# Patient Record
Sex: FEMALE | Race: WHITE | NOT HISPANIC OR LATINO | Employment: OTHER | URBAN - METROPOLITAN AREA
[De-identification: names, ages, dates, MRNs, and addresses within clinical notes are randomized per-mention and may not be internally consistent; named-entity substitution may affect disease eponyms.]

---

## 2018-05-31 LAB — BCS RECOMMENDATION EXT: NORMAL

## 2018-08-07 LAB — CRC RECOMMENDATION EXT: NORMAL

## 2021-02-08 PROBLEM — M54.50 LOW BACK PAIN RADIATING TO RIGHT LEG: Status: ACTIVE | Noted: 2021-02-08

## 2021-02-08 PROBLEM — M79.604 LOW BACK PAIN RADIATING TO RIGHT LEG: Status: ACTIVE | Noted: 2021-02-08

## 2021-02-08 PROBLEM — M54.2 NECK PAIN: Status: ACTIVE | Noted: 2021-02-08

## 2021-02-08 PROBLEM — M79.602 BILATERAL ARM PAIN: Status: ACTIVE | Noted: 2021-02-08

## 2021-02-08 PROBLEM — M79.601 BILATERAL ARM PAIN: Status: ACTIVE | Noted: 2021-02-08

## 2021-02-08 PROBLEM — M51.369 DEGENERATIVE DISC DISEASE, LUMBAR: Status: ACTIVE | Noted: 2021-02-08

## 2021-02-08 PROBLEM — M50.30 DEGENERATIVE DISC DISEASE, CERVICAL: Status: ACTIVE | Noted: 2021-02-08

## 2021-02-08 PROBLEM — E66.01 MORBID OBESITY WITH BMI OF 40.0-44.9, ADULT: Status: ACTIVE | Noted: 2021-02-08

## 2021-02-08 PROBLEM — M51.36 DEGENERATIVE DISC DISEASE, LUMBAR: Status: ACTIVE | Noted: 2021-02-08

## 2022-02-21 ENCOUNTER — OFFICE VISIT (OUTPATIENT)
Dept: PRIMARY CARE CLINIC | Facility: CLINIC | Age: 57
End: 2022-02-21
Payer: MEDICARE

## 2022-02-21 VITALS
WEIGHT: 258 LBS | DIASTOLIC BLOOD PRESSURE: 81 MMHG | OXYGEN SATURATION: 94 % | HEIGHT: 65 IN | RESPIRATION RATE: 18 BRPM | HEART RATE: 94 BPM | BODY MASS INDEX: 42.99 KG/M2 | SYSTOLIC BLOOD PRESSURE: 122 MMHG

## 2022-02-21 DIAGNOSIS — R35.0 URINE FREQUENCY: ICD-10-CM

## 2022-02-21 DIAGNOSIS — Z00.00 ANNUAL PHYSICAL EXAM: ICD-10-CM

## 2022-02-21 DIAGNOSIS — F17.200 CURRENT SMOKER: ICD-10-CM

## 2022-02-21 DIAGNOSIS — E78.49 OTHER HYPERLIPIDEMIA: ICD-10-CM

## 2022-02-21 DIAGNOSIS — Z79.4 TYPE 2 DIABETES MELLITUS WITH OTHER NEUROLOGIC COMPLICATION, WITH LONG-TERM CURRENT USE OF INSULIN: ICD-10-CM

## 2022-02-21 DIAGNOSIS — E03.8 OTHER SPECIFIED HYPOTHYROIDISM: ICD-10-CM

## 2022-02-21 DIAGNOSIS — E66.01 MORBID OBESITY WITH BMI OF 40.0-44.9, ADULT: ICD-10-CM

## 2022-02-21 DIAGNOSIS — I10 BENIGN ESSENTIAL HTN: Primary | ICD-10-CM

## 2022-02-21 DIAGNOSIS — E11.49 TYPE 2 DIABETES MELLITUS WITH OTHER NEUROLOGIC COMPLICATION, WITH LONG-TERM CURRENT USE OF INSULIN: ICD-10-CM

## 2022-02-21 PROBLEM — E78.5 HYPERLIPIDEMIA: Status: ACTIVE | Noted: 2022-02-21

## 2022-02-21 PROCEDURE — 3074F PR MOST RECENT SYSTOLIC BLOOD PRESSURE < 130 MM HG: ICD-10-PCS | Mod: CPTII,S$GLB,, | Performed by: NURSE PRACTITIONER

## 2022-02-21 PROCEDURE — 1160F PR REVIEW ALL MEDS BY PRESCRIBER/CLIN PHARMACIST DOCUMENTED: ICD-10-PCS | Mod: CPTII,S$GLB,, | Performed by: NURSE PRACTITIONER

## 2022-02-21 PROCEDURE — 4010F PR ACE/ARB THEARPY RXD/TAKEN: ICD-10-PCS | Mod: CPTII,S$GLB,, | Performed by: NURSE PRACTITIONER

## 2022-02-21 PROCEDURE — 1159F MED LIST DOCD IN RCRD: CPT | Mod: CPTII,S$GLB,, | Performed by: NURSE PRACTITIONER

## 2022-02-21 PROCEDURE — 1160F RVW MEDS BY RX/DR IN RCRD: CPT | Mod: CPTII,S$GLB,, | Performed by: NURSE PRACTITIONER

## 2022-02-21 PROCEDURE — 99204 PR OFFICE/OUTPT VISIT, NEW, LEVL IV, 45-59 MIN: ICD-10-PCS | Mod: S$GLB,,, | Performed by: NURSE PRACTITIONER

## 2022-02-21 PROCEDURE — 3008F PR BODY MASS INDEX (BMI) DOCUMENTED: ICD-10-PCS | Mod: CPTII,S$GLB,, | Performed by: NURSE PRACTITIONER

## 2022-02-21 PROCEDURE — 3079F DIAST BP 80-89 MM HG: CPT | Mod: CPTII,S$GLB,, | Performed by: NURSE PRACTITIONER

## 2022-02-21 PROCEDURE — 3008F BODY MASS INDEX DOCD: CPT | Mod: CPTII,S$GLB,, | Performed by: NURSE PRACTITIONER

## 2022-02-21 PROCEDURE — 1159F PR MEDICATION LIST DOCUMENTED IN MEDICAL RECORD: ICD-10-PCS | Mod: CPTII,S$GLB,, | Performed by: NURSE PRACTITIONER

## 2022-02-21 PROCEDURE — 99204 OFFICE O/P NEW MOD 45 MIN: CPT | Mod: S$GLB,,, | Performed by: NURSE PRACTITIONER

## 2022-02-21 PROCEDURE — 3079F PR MOST RECENT DIASTOLIC BLOOD PRESSURE 80-89 MM HG: ICD-10-PCS | Mod: CPTII,S$GLB,, | Performed by: NURSE PRACTITIONER

## 2022-02-21 PROCEDURE — 3074F SYST BP LT 130 MM HG: CPT | Mod: CPTII,S$GLB,, | Performed by: NURSE PRACTITIONER

## 2022-02-21 PROCEDURE — 4010F ACE/ARB THERAPY RXD/TAKEN: CPT | Mod: CPTII,S$GLB,, | Performed by: NURSE PRACTITIONER

## 2022-02-21 RX ORDER — AMMONIUM LACTATE 12 G/100G
LOTION TOPICAL
COMMUNITY
Start: 2022-01-04

## 2022-02-21 RX ORDER — KETOCONAZOLE 20 MG/ML
SHAMPOO, SUSPENSION TOPICAL
COMMUNITY
Start: 2022-02-15 | End: 2023-02-23

## 2022-02-21 RX ORDER — ESTRADIOL 1 MG/1
1 TABLET ORAL DAILY
COMMUNITY
Start: 2021-12-17 | End: 2022-09-20

## 2022-02-21 RX ORDER — LORATADINE 10 MG/1
10 TABLET ORAL
COMMUNITY

## 2022-02-21 RX ORDER — ERGOCALCIFEROL 1.25 MG/1
50000 CAPSULE ORAL
COMMUNITY
End: 2022-08-23 | Stop reason: SDUPTHER

## 2022-02-21 RX ORDER — POTASSIUM CHLORIDE 20 MEQ/1
20 TABLET, EXTENDED RELEASE ORAL DAILY
COMMUNITY
Start: 2022-02-03 | End: 2022-06-23 | Stop reason: SDUPTHER

## 2022-02-21 RX ORDER — ATORVASTATIN CALCIUM 80 MG/1
80 TABLET, FILM COATED ORAL DAILY
COMMUNITY
Start: 2022-01-26 | End: 2023-08-30 | Stop reason: SDUPTHER

## 2022-02-21 RX ORDER — PREGABALIN 300 MG/1
300 CAPSULE ORAL 2 TIMES DAILY
COMMUNITY
Start: 2022-02-03 | End: 2022-08-23

## 2022-02-21 RX ORDER — OMEPRAZOLE 20 MG/1
20 CAPSULE, DELAYED RELEASE ORAL DAILY
COMMUNITY
Start: 2022-01-18 | End: 2022-06-15 | Stop reason: SDUPTHER

## 2022-02-21 RX ORDER — ALBUTEROL SULFATE 90 UG/1
AEROSOL, METERED RESPIRATORY (INHALATION)
COMMUNITY
Start: 2022-02-03 | End: 2022-11-21 | Stop reason: SDUPTHER

## 2022-02-21 RX ORDER — FUROSEMIDE 40 MG/1
40 TABLET ORAL DAILY
COMMUNITY
Start: 2022-01-18 | End: 2022-08-23

## 2022-02-21 RX ORDER — AZELASTINE 1 MG/ML
SPRAY, METERED NASAL
COMMUNITY
Start: 2022-02-14 | End: 2022-07-26 | Stop reason: SDUPTHER

## 2022-02-21 RX ORDER — FOLIC ACID 1 MG/1
1000 TABLET ORAL DAILY
COMMUNITY
Start: 2022-02-14 | End: 2022-09-14

## 2022-02-21 RX ORDER — MONTELUKAST SODIUM 10 MG/1
10 TABLET ORAL NIGHTLY
COMMUNITY
Start: 2022-02-14 | End: 2022-11-21

## 2022-02-21 RX ORDER — DAPAGLIFLOZIN AND METFORMIN HYDROCHLORIDE 5; 1000 MG/1; MG/1
TABLET, FILM COATED, EXTENDED RELEASE ORAL
COMMUNITY
Start: 2022-01-26 | End: 2024-01-29 | Stop reason: SDUPTHER

## 2022-02-21 RX ORDER — VARENICLINE TARTRATE 0.5 (11)-1
KIT ORAL
Qty: 1 EACH | Refills: 0 | Status: SHIPPED | OUTPATIENT
Start: 2022-02-21 | End: 2022-03-14 | Stop reason: SDUPTHER

## 2022-02-21 RX ORDER — FLUOCINONIDE TOPICAL SOLUTION USP, 0.05% 0.5 MG/ML
SOLUTION TOPICAL
COMMUNITY
Start: 2022-02-15 | End: 2023-02-23

## 2022-02-21 RX ORDER — LEVOTHYROXINE SODIUM 75 UG/1
75 TABLET ORAL EVERY MORNING
COMMUNITY
Start: 2022-01-26 | End: 2022-05-11

## 2022-02-21 RX ORDER — AMITRIPTYLINE HYDROCHLORIDE 25 MG/1
25 TABLET, FILM COATED ORAL DAILY
COMMUNITY
Start: 2022-02-03 | End: 2023-02-23

## 2022-02-21 RX ORDER — TRAMADOL HYDROCHLORIDE 50 MG/1
50 TABLET ORAL 4 TIMES DAILY
COMMUNITY
Start: 2021-12-23 | End: 2022-08-23

## 2022-02-21 RX ORDER — LOSARTAN POTASSIUM 100 MG/1
100 TABLET ORAL DAILY
COMMUNITY
Start: 2021-12-17 | End: 2022-09-20

## 2022-02-21 RX ORDER — CYCLOBENZAPRINE HCL 10 MG
TABLET ORAL
COMMUNITY
Start: 2022-01-18 | End: 2024-01-29 | Stop reason: SDUPTHER

## 2022-02-21 RX ORDER — IPRATROPIUM BROMIDE AND ALBUTEROL 20; 100 UG/1; UG/1
SPRAY, METERED RESPIRATORY (INHALATION)
COMMUNITY
Start: 2022-02-03 | End: 2022-06-23 | Stop reason: SDUPTHER

## 2022-02-21 RX ORDER — INSULIN GLARGINE 300 U/ML
INJECTION, SOLUTION SUBCUTANEOUS
COMMUNITY
Start: 2022-01-26

## 2022-02-21 RX ORDER — DULAGLUTIDE 4.5 MG/.5ML
INJECTION, SOLUTION SUBCUTANEOUS
COMMUNITY
Start: 2022-02-14 | End: 2022-08-23

## 2022-02-21 RX ORDER — ASPIRIN 325 MG
TABLET, DELAYED RELEASE (ENTERIC COATED) ORAL
COMMUNITY
Start: 2022-01-20 | End: 2022-11-21 | Stop reason: SDUPTHER

## 2022-02-21 RX ORDER — TRIAMCINOLONE ACETONIDE 1 MG/G
CREAM TOPICAL
COMMUNITY
Start: 2022-02-15 | End: 2023-02-23

## 2022-02-21 RX ORDER — PRAVASTATIN SODIUM 80 MG/1
80 TABLET ORAL DAILY
COMMUNITY
Start: 2022-02-14 | End: 2022-08-23

## 2022-02-21 NOTE — PATIENT INSTRUCTIONS
RTC in 2 months for F/U or sooner if needed.    Keep appts with specialists as scheduled.    Instructed patient to report to nearest ER or call 911 if begins to have difficulty breathing, turning blue, chest pain, B/P < 80/60 or >170/100, palpitations, syncope, extreme weakness, or severe H/A. Patient verbalized understanding.      Patient Education       Yearly Physical for Adults   About this topic   Most people do not want to be sick. Having a checkup each year with your doctor is one way to help you stay healthy. You may need to see your doctor more or less often. How often you need to go to the doctor depends on your age. Your family and medical history also play a role in how often you need to go to the doctor. Going to see your doctor on a routine basis can help you find problems early or even before they start. This may make it easier to treat or cure your problem.  General   Your doctor will talk about many things during your checkup. Your doctor may ask about:  Your medical and family history.  All the drugs you are taking. Be sure to include all prescription, over the counter, and herbal supplements. Tell the doctor if you have any drug allergy. Bring a list of drugs you take with you.  How you are feeling and if you are having any problems.  Risky behaviors like smoking, drinking alcohol, using illegal drugs, not wearing seatbelts, having unprotected sex, etc.  Your doctor will do a physical exam and may check your:  Height and weight  Blood pressure  Reflexes  Memory  Vision  Hearing  Your doctor may order:  Lab tests  ECG to check your heart rhythm  X-rays  Tests or treatments based on your exam  What lifestyle changes are needed?   Your doctor may suggest you make changes to your lifestyle at this visit. The doctor may talk with you about being more active or lowering stress levels. Ask your doctor what you need to do.  What drugs may be needed?   Your doctor may order drugs or vaccines to protect you  from illnesses.  What changes to diet are needed?   Talk to your doctor to see if any changes are needed to your diet.  When do I need to call the doctor?   Call your doctor if you need to learn about any test results. Together you can make a plan for more care.  Helpful tips   Make a list of questions for your doctor before you go. This will help you remember to ask about any concerns. Write down any answers from your doctor so you can look over them after your visit.   Tell your doctor about any changes in your body or health since your last visit.  Ask your doctor about any screening tests you need.  Where can I learn more?   [employer]  http://[email or web address]/familydoctor/en/prevention-wellness/staying-[email or web address]-living/preventive-services-for-healthy-living.printerview.html   Centers for Disease Control  http://www.cdc.gov/family/checkup/   Last Reviewed Date   2019-04-22  Consumer Information Use and Disclaimer   This information is not specific medical advice and does not replace information you receive from your health care provider. This is only a brief summary of general information. It does NOT include all information about conditions, illnesses, injuries, tests, procedures, treatments, therapies, discharge instructions or life-style choices that may apply to you. You must talk with your health care provider for complete information about your health and treatment options. This information should not be used to decide whether or not to accept your health care providers advice, instructions or recommendations. Only your health care provider has the knowledge and training to provide advice that is right for you.  Copyright   Copyright © 2021 [employer] and its affiliates and/or licensors. All rights reserved.  Patient Education       Low Cholesterol, Saturated Fat, and Trans Fat Diet   About this topic   Cholesterol, saturated fat, and trans fat are in many foods. These  "may raise your blood cholesterol levels. If your cholesterol is too high, this can cause health problems in your heart, liver, kidneys, and even your eyes. The key to lowering your risk of heart problems is to lower your bad fat intake.  Saturated fats and trans fats are the bad fats. These fats clog your arteries and raise your bad cholesterol. Saturated fats and trans fats are solid fats at room temperature. Saturated fats are animal fats. Trans fats are manmade fats. They add flavor to a lot of packaged foods. Staying away from saturated and trans fats will help your heart.  When you do eat foods with fat, make sure they have the good fats. Monounsaturated and polyunsaturated fats are good fats. These fats help raise your good cholesterol and protect your heart.  General   How to Lower Fat and Cholesterol in Your Diet   Read the labels of the foods you buy from the market to find out how much fat is present. Under 5% of total fat on a label means it is "low fat". Over 20% of total fat on a label means it is high fat.  Eat high fiber foods, like soluble fiber. This type of fiber helps lower cholesterol in the body. Choose oatmeal, fruits (like apples), beans, and nuts to get the most soluble fiber.  Eat foods high in omega-3 fatty acids like gopal seeds, walnuts, salmon, tuna, trout, herring, flaxseed, and soybeans. These foods help keep the heart healthy.  Limit your bad fat and oil intake.  Stay away from butter, stick margarine, shortening, lard, and palm and coconut oil. Pick plant-based spreads instead.  Limit mayonnaise, salad dressings, gravies, and sauces, unless it is made from low-fat ingredients.  Limit chocolate.  Do not eat high-fat processed foods like hot dogs, feliciano, sausage, ham and other luncheon meats high in fat, and some frozen foods. Pick fish, chicken, turkey, and lean meats instead.  Eat more dried beans, lentils, and tofu to get your protein.  Do not eat organ meats, like liver.  Choose " nonfat or low-fat milk, yogurt, and cheese.  Use light or fat-free cream cheese and sour cream.  Eat lots of fruits and vegetables.  Pick whole grain breads, cereals, pastas, and rice.  Do not eat snacks that are high in fats like granola, cookies, pies, pastries, doughnuts, and croissants.  Stay away from deep fried foods.  Help When Cooking   Remove the fat portion of meats and the skin from poultry before cooking.  Bake, broil, grill, poach, or roast poultry, fish, and lean meats.  Drain and throw away the fat that drains out of meat as you cook it.  Try to add little or no fat to foods.  Use olive or canola oil for cooking or baking.  Steam your vegetables.  Use herbs or no-oil marinades to flavor foods.         Who should use this diet?   This diet is for people who are at high risk of getting health problems like heart disease, high blood pressure, diabetes, and others. This diet is also good for all people to follow to keep your heart healthy.  What foods are good to eat?   Foods with good fats are:  Canola, peanut, and olive oil  Safflower, soybean, and corn oil  Walnuts, almonds, cashews, and peanuts  Pumpkin and sunflower seeds  [place] and tuna  Tofu  Soymilk  Avocado  What foods should be limited or avoided?   Stay away from these types of foods that have saturated fats:  Whole fat dairy products like cheese, ice cream, whole milk, and cream  Palm and coconut oils  High-fat meats like beef, lamb, poultry with the skin, feliciano, and sausage  Butter and lard  Stay away from these types of foods that may have trans fat:  Cookies, cakes, candy, doughnuts, baked goods, muffins, pizza dough, and pie crusts that are packaged  Fried foods  Frozen dinners  Chips and crackers  Microwave popcorn  Stick margarine and vegetable shortenings  Helpful tips   To help stay away from saturated fat:  Pick lean cuts of meat  Take the skin off chicken and turkey or pick skinless  Pick low-fat cheese, milk, and ice cream  Use  liquid oils when cooking and baking, such as olive oil and canola oil  To help stay away from trans fat:  Look at your labels. Choose foods with 0% trans fat. Read the ingredient list. Avoid foods with partially hydrogenated oil in the ingredient list. This means there is trans fat in the product.  Where can I learn more?   American Heart Association   http://www.heart.org/HEARTORG/Conditions/Cholesterol/PreventionTreatmentofHighCholesterol/Know-Your-Fats_[facility]_305628_Article.jsp   Last Reviewed Date   2021-10-05  Consumer Information Use and Disclaimer   This information is not specific medical advice and does not replace information you receive from your health care provider. This is only a brief summary of general information. It does NOT include all information about conditions, illnesses, injuries, tests, procedures, treatments, therapies, discharge instructions or life-style choices that may apply to you. You must talk with your health care provider for complete information about your health and treatment options. This information should not be used to decide whether or not to accept your health care providers advice, instructions or recommendations. Only your health care provider has the knowledge and training to provide advice that is right for you.   Copyright   Copyright © 2021 [employer] and its affiliates and/or licensors. All rights reserved.

## 2022-02-21 NOTE — PROGRESS NOTES
Subjective:       Patient ID: Anca Grace is a 56 y.o. female.    Chief Complaint: Establish Care    HPI: Anca is a 56 y.o. female who presents to establish care with a new PCP. Previously seen by Dr. Severino in [place].    DM II -  Chronic and uncontrolled on meds, b/p averaging < 130/80, denies s/e or a/r. Followed by [facility] for Endocrinology for her DM Type 2 AND Hypothyroidism. Takes Toujeo 100 units daily, Trulicity 4.5 weekly and Xigduo daily. Her blood sugars run between 200-350. Next appt is in April. Has Neuropathy and she takes Lyrica for it which is prescribed by     Also followed by a Hematologist, in [place] due to some abnormalities.    DMII - stable, controlled on meds, BG averaging < 130, denies hypoglycemic episodes, denies s/e or a/r.     Hyperlipidemia - controlled with a STATIN. Denies se/ar.    Hypothyroidism - takes levothyroxine 75 mcg daily. Denies se/ar to medication.    Family history of heart disease, HLD, AND HTN. Also with Colon CA in her paternal grandfather and brother.    Had a colonoscopy done at [facility] 4 years ago and had couple polyps removed. Repeat in 5 years.    Mammogram done last year at [facility] and was normal.    Followed by Dr. Angel in [place]. Had a carpal tunnel surgery, scope to left knee last August and some of her bone was scraped for her arthritis. Follows up monthly for muscle relaxer injections. Also followed by Dr. Gallegos for lower back pain and he does back injections.    Had a complete hysterectomy several years ago.    Smokes 1 ppd for 15 years. Would like to quit smoking. Asking for script for Chantix, tried patches in the past with no success. Denies drinking.    UTD with COVID and FLU vaccines.              Past Medical History:   Diagnosis Date    Diabetes mellitus, type 2     GERD (gastroesophageal reflux disease)     Hyperlipidemia     Hypertension        Past Surgical History:   Procedure Laterality Date    CARPAL TUNNEL RELEASE       HYSTERECTOMY         Family History   Problem Relation Age of Onset    Thyroiditis Mother     Heart disease Father     Graves' disease Sister     Heart disease Maternal Grandfather     Cancer Paternal Grandfather        Social History     Tobacco Use    Smoking status: Current Every Day Smoker     Types: Cigarettes    Smokeless tobacco: Never Used   Substance Use Topics    Alcohol use: Never    Drug use: Never       Patient Active Problem List   Diagnosis    Degenerative disc disease, cervical    Degenerative disc disease, lumbar    Neck pain    Bilateral arm pain    Low back pain radiating to right leg    Morbid obesity with BMI of 40.0-44.9, adult    Hyperlipidemia    Benign essential HTN       Immunization History   Administered Date(s) Administered    COVID-19, MRNA, LN-S, PF (MODERNA FULL 0.5 ML DOSE) 10/30/2021, 11/27/2021    DTaP 1966, 01/18/1967, 03/08/1967, 04/10/1968, 03/03/1971, 08/01/1977    IPV 1966, 01/18/1967, 03/08/1967, 04/10/1968, 04/07/1978    Influenza - Quadrivalent - PF *Preferred* (6 months and older) 10/14/2021    Measles 11/07/1967    Mumps 11/13/1968    Rubella 10/21/1970    Tdap 04/13/1988, 08/06/2004           Review of Systems   Constitutional: Negative for activity change, appetite change, chills, diaphoresis, fatigue and fever.   HENT: Negative for congestion, ear pain, sinus pain, tinnitus and trouble swallowing.    Eyes: Negative for pain and visual disturbance.   Respiratory: Negative for cough, chest tightness, shortness of breath and wheezing.    Cardiovascular: Negative for chest pain, palpitations and leg swelling.   Gastrointestinal: Negative for abdominal distention, abdominal pain, blood in stool, constipation, diarrhea, nausea and vomiting.   Endocrine: Positive for polydipsia, polyphagia and polyuria. Negative for cold intolerance and heat intolerance.   Genitourinary: Negative for decreased urine volume, dysuria, frequency,  "hematuria and urgency.   Musculoskeletal: Positive for arthralgias (knees) and back pain (chronic). Negative for gait problem, neck pain and neck stiffness.   Skin: Negative for color change, pallor and rash.   Neurological: Negative for dizziness, syncope, weakness, light-headedness, numbness and headaches.   Hematological: Negative for adenopathy. Does not bruise/bleed easily.   Psychiatric/Behavioral: Negative for behavioral problems, confusion and dysphoric mood. The patient is not nervous/anxious.      Objective:     Vitals:    02/21/22 1143   BP: 122/81   BP Location: Right arm   Patient Position: Sitting   BP Method: Large (Automatic)   Pulse: 94   Resp: 18   SpO2: (!) 94%   Weight: 117 kg (258 lb)   Height: 5' 5" (1.651 m)       Physical Exam  Vitals and nursing note reviewed.   Constitutional:       General: She is not in acute distress.     Appearance: Normal appearance. She is obese. She is not ill-appearing or diaphoretic.   HENT:      Head: Normocephalic and atraumatic.      Right Ear: External ear normal.      Left Ear: External ear normal.      Nose: Nose normal.      Mouth/Throat:      Mouth: Mucous membranes are moist.      Pharynx: Oropharynx is clear.   Eyes:      Extraocular Movements: Extraocular movements intact.      Conjunctiva/sclera: Conjunctivae normal.      Pupils: Pupils are equal, round, and reactive to light.   Neck:      Thyroid: No thyromegaly or thyroid tenderness.   Cardiovascular:      Rate and Rhythm: Normal rate and regular rhythm.      Pulses: Normal pulses.      Heart sounds: Normal heart sounds.   Pulmonary:      Effort: Pulmonary effort is normal.      Breath sounds: Normal breath sounds.   Abdominal:      General: Bowel sounds are normal. There is no distension.      Palpations: Abdomen is soft.      Tenderness: There is no abdominal tenderness.   Musculoskeletal:         General: Tenderness (lower back and knees) present. Normal range of motion.      Cervical back: Normal " range of motion and neck supple.      Right lower leg: No edema.      Left lower leg: No edema.   Lymphadenopathy:      Cervical: No cervical adenopathy.   Skin:     General: Skin is warm and dry.      Capillary Refill: Capillary refill takes less than 2 seconds.   Neurological:      Mental Status: She is alert and oriented to person, place, and time.      Cranial Nerves: Cranial nerves are intact.      Sensory: Sensory deficit present.      Motor: Motor function is intact.      Coordination: Coordination is intact.      Gait: Gait is intact.      Deep Tendon Reflexes: Reflexes are normal and symmetric.   Psychiatric:         Mood and Affect: Mood and affect normal.         Speech: Speech normal.         Behavior: Behavior normal. Behavior is cooperative.         Thought Content: Thought content normal.         Cognition and Memory: Cognition and memory normal.         Judgment: Judgment normal.         No visits with results within 6 Month(s) from this visit.   Latest known visit with results is:   No results found for any previous visit.         Assessment:      1. Benign essential HTN Well controlled   2. Other hyperlipidemia Stable   3. Morbid obesity with BMI of 40.0-44.9, adult    4. Type 2 diabetes mellitus with other neurologic complication, with long-term current use of insulin Chronic   5. Other specified hypothyroidism Stable   6. Current smoker    7. Annual physical exam    8. Urine frequency          Plan:     Benign essential HTN  Comments:  continue BP medication, limit salt intake.  Orders:  -     CBC Auto Differential; Future; Expected date: 02/21/2022  -     Comprehensive Metabolic Panel; Future; Expected date: 02/21/2022  -     Lipid Panel; Future; Expected date: 02/21/2022  -     TSH w/reflex to FT4; Future; Expected date: 02/21/2022    Other hyperlipidemia  Comments:  Continue STATIN daily, eat low cholesterol diet and exercise regularly  Orders:  -     Lipid Panel; Future; Expected date:  02/21/2022    Morbid obesity with BMI of 40.0-44.9, adult    Type 2 diabetes mellitus with other neurologic complication, with long-term current use of insulin  Comments:  Continue current medications, keep scheduled appts with Endocrinology  Orders:  -     Comprehensive Metabolic Panel; Future; Expected date: 02/21/2022  -     Hemoglobin A1C; Future; Expected date: 02/21/2022    Other specified hypothyroidism  Comments:  continue levothyroxine 75 mcg daily, will check thyroid panel and see results  Orders:  -     TSH w/reflex to FT4; Future; Expected date: 02/21/2022    Current smoker  Comments:  Chantix ordered, referred to smoking cessation program  Orders:  -     varenicline (CHANTIX STARTING MONTH BOX) 0.5 mg (11)- 1 mg (42) tablet; Take one 0.5mg tab by mouth once daily X3 days,then increase to one 0.5mg tab twice daily X4 days,then increase to one 1mg tab twice daily  Dispense: 1 each; Refill: 0  -     Ambulatory referral/consult to Smoking Cessation Program; Future; Expected date: 02/28/2022    Annual physical exam  Comments:  Will review labs and determine POC based on results.  Orders:  -     CBC Auto Differential; Future; Expected date: 02/21/2022  -     Comprehensive Metabolic Panel; Future; Expected date: 02/21/2022  -     Lipid Panel; Future; Expected date: 02/21/2022  -     TSH w/reflex to FT4; Future; Expected date: 02/21/2022    Urine frequency  -     Urinalysis, Reflex to Urine Culture Urine, Clean Catch; Future         Current Outpatient Medications   Medication Sig Dispense Refill    albuterol (PROVENTIL/VENTOLIN HFA) 90 mcg/actuation inhaler INHALE 2 PUFFS BY MOUTH EVERY EVENING AS NEEDED FOR SHORTNESS OF BREATH OR FOR WHEEZING      amitriptyline (ELAVIL) 25 MG tablet Take 25 mg by mouth once daily.      ammonium lactate (LAC-HYDRIN) 12 % lotion APPLY TO AFFECTED AREA 2 TIMES A DAY thank youmike -)      atorvastatin (LIPITOR) 80 MG tablet Take 80 mg by mouth once daily.      azelastine  (ASTELIN) 137 mcg (0.1 %) nasal spray SPRAY 1 SPRAY INTRANASALLY 2 TIMES A DAY      cholecalciferol, vitamin D3, 1,250 mcg (50,000 unit) capsule TAKE 1 CAPSULE BY MOUTH WEEKLY AS DIRECTED thank stephenie -)      COMBIVENT RESPIMAT  mcg/actuation inhaler INHALE 1 puff BY MOUTH 4 TIMES A DAY      cyclobenzaprine (FLEXERIL) 10 MG tablet TAKE 1 TABLET BY MOUTH 3 TIMES A DAY AS NEEDED FOR MUSCLE SPASMS MAY MAKE DROWSY      ergocalciferol (ERGOCALCIFEROL) 50,000 unit Cap Take 50,000 Units by mouth.      estradioL (ESTRACE) 1 MG tablet Take 1 mg by mouth once daily.      fluocinonide (LIDEX) 0.05 % external solution APPLY to to AFFECTED AREA DAILY      fluticasone (VERAMYST) 27.5 mcg/actuation nasal spray 2 sprays by Nasal route.      folic acid (FOLVITE) 1 MG tablet Take 1,000 mcg by mouth once daily.      furosemide (LASIX) 40 MG tablet Take 40 mg by mouth once daily.      ketoconazole (NIZORAL) 2 % shampoo APPLY TOPICALLY to wash AFFECTED AREA DAILY      levothyroxine (SYNTHROID) 75 MCG tablet Take 75 mcg by mouth every morning.      loratadine (CLARITIN) 10 mg tablet Take 10 mg by mouth.      losartan (COZAAR) 100 MG tablet Take 100 mg by mouth once daily.      montelukast (SINGULAIR) 10 mg tablet Take 10 mg by mouth every evening.      omeprazole (PRILOSEC) 20 MG capsule Take 20 mg by mouth once daily.      potassium chloride SA (K-DUR,KLOR-CON) 20 MEQ tablet Take 20 mEq by mouth once daily.      pravastatin (PRAVACHOL) 80 MG tablet Take 80 mg by mouth once daily.      pregabalin (LYRICA) 300 MG Cap Take 300 mg by mouth 2 (two) times daily.      TOUJEO MAX U-300 SOLOSTAR 300 unit/mL (3 mL) insulin pen INJECT 100 UNITS SUBCUTANEOUS once DAILY      traMADoL (ULTRAM) 50 mg tablet Take 50 mg by mouth 4 (four) times daily.      triamcinolone acetonide 0.1% (KENALOG) 0.1 % cream APPLY TOPICALLY 2 TIMES A DAY. Apply to Frontal Scalp, Left Superior Cutaneous Lip (Apical Triangle), Right Superior  Cutaneous Lip (Apical Triangle)]      TRULICITY 4.5 mg/0.5 mL pen injector Inject 4.5 mg subcutaneously once a week      XIGDUO XR 5-1,000 mg TBph TAKE 2 TABLETS BY MOUTH EACH MORNING      varenicline (CHANTIX STARTING MONTH BOX) 0.5 mg (11)- 1 mg (42) tablet Take one 0.5mg tab by mouth once daily X3 days,then increase to one 0.5mg tab twice daily X4 days,then increase to one 1mg tab twice daily 1 each 0     No current facility-administered medications for this visit.       There are no discontinued medications.    Health Maintenance   Topic Date Due    Lipid Panel  Never done    Mammogram  Never done    TETANUS VACCINE  08/06/2014    Hepatitis C Screening  02/21/2023 (Originally 1965)       Patient Instructions   RTC in 2 months for F/U or sooner if needed.    Keep appts with specialists as scheduled.    Instructed patient to report to nearest ER or call 911 if begins to have difficulty breathing, turning blue, chest pain, B/P < 80/60 or >170/100, palpitations, syncope, extreme weakness, or severe H/A. Patient verbalized understanding.      Patient Education       Yearly Physical for Adults   About this topic   Most people do not want to be sick. Having a checkup each year with your doctor is one way to help you stay healthy. You may need to see your doctor more or less often. How often you need to go to the doctor depends on your age. Your family and medical history also play a role in how often you need to go to the doctor. Going to see your doctor on a routine basis can help you find problems early or even before they start. This may make it easier to treat or cure your problem.  General   Your doctor will talk about many things during your checkup. Your doctor may ask about:  · Your medical and family history.  · All the drugs you are taking. Be sure to include all prescription, over the counter, and herbal supplements. Tell the doctor if you have any drug allergy. Bring a list of drugs you take  with you.  · How you are feeling and if you are having any problems.  · Risky behaviors like smoking, drinking alcohol, using illegal drugs, not wearing seatbelts, having unprotected sex, etc.  Your doctor will do a physical exam and may check your:  · Height and weight  · Blood pressure  · Reflexes  · Memory  · Vision  · Hearing  Your doctor may order:  · Lab tests  · ECG to check your heart rhythm  · X-rays  · Tests or treatments based on your exam  What lifestyle changes are needed?   Your doctor may suggest you make changes to your lifestyle at this visit. The doctor may talk with you about being more active or lowering stress levels. Ask your doctor what you need to do.  What drugs may be needed?   Your doctor may order drugs or vaccines to protect you from illnesses.  What changes to diet are needed?   Talk to your doctor to see if any changes are needed to your diet.  When do I need to call the doctor?   Call your doctor if you need to learn about any test results. Together you can make a plan for more care.  Helpful tips   · Make a list of questions for your doctor before you go. This will help you remember to ask about any concerns. Write down any answers from your doctor so you can look over them after your visit.   · Tell your doctor about any changes in your body or health since your last visit.  · Ask your doctor about any screening tests you need.  Where can I learn more?   American Academy of Family [employer]icians  http://familydoctor.org/familydoctor/en/prevention-wellness/staying-healthy/healthy-living/preventive-services-for-healthy-living.printerview.html   Centers for Disease Control  http://www.cdc.gov/family/checkup/   Last Reviewed Date   2019-04-22  Consumer Information Use and Disclaimer   This information is not specific medical advice and does not replace information you receive from your health care provider. This is only a brief summary of general information. It does NOT include all  "information about conditions, illnesses, injuries, tests, procedures, treatments, therapies, discharge instructions or life-style choices that may apply to you. You must talk with your health care provider for complete information about your health and treatment options. This information should not be used to decide whether or not to accept your health care providers advice, instructions or recommendations. Only your health care provider has the knowledge and training to provide advice that is right for you.  Copyright   Copyright © 2021 UpToDate, Inc. and its affiliates and/or licensors. All rights reserved.  Patient Education       Low Cholesterol, Saturated Fat, and Trans Fat Diet   About this topic   Cholesterol, saturated fat, and trans fat are in many foods. These may raise your blood cholesterol levels. If your cholesterol is too high, this can cause health problems in your heart, liver, kidneys, and even your eyes. The key to lowering your risk of heart problems is to lower your bad fat intake.  Saturated fats and trans fats are the bad fats. These fats clog your arteries and raise your bad cholesterol. Saturated fats and trans fats are solid fats at room temperature. Saturated fats are animal fats. Trans fats are manmade fats. They add flavor to a lot of packaged foods. Staying away from saturated and trans fats will help your heart.  When you do eat foods with fat, make sure they have the good fats. Monounsaturated and polyunsaturated fats are good fats. These fats help raise your good cholesterol and protect your heart.  General   How to Lower Fat and Cholesterol in Your Diet   · Read the labels of the foods you buy from the market to find out how much fat is present. Under 5% of total fat on a label means it is "low fat". Over 20% of total fat on a label means it is high fat.  · Eat high fiber foods, like soluble fiber. This type of fiber helps lower cholesterol in the body. Choose oatmeal, fruits " (like apples), beans, and nuts to get the most soluble fiber.  · Eat foods high in omega-3 fatty acids like gopal seeds, walnuts, salmon, tuna, trout, herring, flaxseed, and soybeans. These foods help keep the heart healthy.  · Limit your bad fat and oil intake.  · Stay away from butter, stick margarine, shortening, lard, and palm and coconut oil. Pick plant-based spreads instead.  · Limit mayonnaise, salad dressings, gravies, and sauces, unless it is made from low-fat ingredients.  · Limit chocolate.  · Do not eat high-fat processed foods like hot dogs, feliciano, sausage, ham and other luncheon meats high in fat, and some frozen foods. Pick fish, chicken, turkey, and lean meats instead.  · Eat more dried beans, lentils, and tofu to get your protein.  · Do not eat organ meats, like liver.  · Choose nonfat or low-fat milk, yogurt, and cheese.  · Use light or fat-free cream cheese and sour cream.  · Eat lots of fruits and vegetables.  · Pick whole grain breads, cereals, pastas, and rice.  · Do not eat snacks that are high in fats like granola, cookies, pies, pastries, doughnuts, and croissants.  · Stay away from deep fried foods.  Help When Cooking   · Remove the fat portion of meats and the skin from poultry before cooking.  · Bake, broil, grill, poach, or roast poultry, fish, and lean meats.  · Drain and throw away the fat that drains out of meat as you cook it.  · Try to add little or no fat to foods.  · Use olive or canola oil for cooking or baking.  · Steam your vegetables.  · Use herbs or no-oil marinades to flavor foods.         Who should use this diet?   This diet is for people who are at high risk of getting health problems like heart disease, high blood pressure, diabetes, and others. This diet is also good for all people to follow to keep your heart healthy.  What foods are good to eat?   Foods with good fats are:  · Canola, peanut, and olive oil  · Safflower, soybean, and corn oil  · Walnuts, almonds,  cashews, and peanuts  · Pumpkin and sunflower seeds  · [place] and tuna  · Tofu  · Soymilk  · Avocado  What foods should be limited or avoided?   Stay away from these types of foods that have saturated fats:  · Whole fat dairy products like cheese, ice cream, whole milk, and cream  · Palm and coconut oils  · High-fat meats like beef, lamb, poultry with the skin, feliciano, and sausage  · Butter and lard  Stay away from these types of foods that may have trans fat:  · Cookies, cakes, candy, doughnuts, baked goods, muffins, pizza dough, and pie crusts that are packaged  · Fried foods  · Frozen dinners  · Chips and crackers  · Microwave popcorn  · Stick margarine and vegetable shortenings  Helpful tips   To help stay away from saturated fat:  · Pick lean cuts of meat  · Take the skin off chicken and turkey or pick skinless  · Pick low-fat cheese, milk, and ice cream  · Use liquid oils when cooking and baking, such as olive oil and canola oil  To help stay away from trans fat:  · Look at your labels. Choose foods with 0% trans fat. Read the ingredient list. Avoid foods with partially hydrogenated oil in the ingredient list. This means there is trans fat in the product.  Where can I learn more?   American Heart Association   http://www.heart.org/HEARTORG/Conditions/Cholesterol/PreventionTreatmentofHighCholesterol/Know-Your-Fats_[facility]_305628_Article.jsp   Last Reviewed Date   2021-10-05  Consumer Information Use and Disclaimer   This information is not specific medical advice and does not replace information you receive from your health care provider. This is only a brief summary of general information. It does NOT include all information about conditions, illnesses, injuries, tests, procedures, treatments, therapies, discharge instructions or life-style choices that may apply to you. You must talk with your health care provider for complete information about your health and treatment options. This information should not be used  to decide whether or not to accept your health care providers advice, instructions or recommendations. Only your health care provider has the knowledge and training to provide advice that is right for you.   Copyright   Copyright © 2021 UpToDate, Inc. and its affiliates and/or licensors. All rights reserved.        Risks, benefits, and alternatives discussed with patient, Patient verbalized understanding of discussed plan of care. Asked patient if any further questions, answered no.    No future appointments.           Garrett Pulliam, NP

## 2022-03-14 DIAGNOSIS — F17.200 CURRENT SMOKER: ICD-10-CM

## 2022-03-14 RX ORDER — VARENICLINE TARTRATE 0.5 (11)-1
KIT ORAL
Qty: 1 TABLET | Refills: 0 | Status: SHIPPED | OUTPATIENT
Start: 2022-03-14 | End: 2022-03-25 | Stop reason: RX

## 2022-03-14 NOTE — TELEPHONE ENCOUNTER
Advised pt that I sent her refill to [facility] and for her to check with her pharmacy this afternoon

## 2022-03-15 ENCOUNTER — TELEPHONE (OUTPATIENT)
Dept: PRIMARY CARE CLINIC | Facility: CLINIC | Age: 57
End: 2022-03-15
Payer: MEDICARE

## 2022-03-15 NOTE — TELEPHONE ENCOUNTER
----- Message from Logan Zendejas sent at 3/15/2022 10:49 AM CDT -----  Contact: [facility] [address]  [facility] call regarding patient script that was written. Please call Dora at [phone]

## 2022-03-15 NOTE — TELEPHONE ENCOUNTER
Spoke with pt and provided her with the information to call and check the status of her smoking censeation referral.

## 2022-03-15 NOTE — TELEPHONE ENCOUNTER
----- Message from Logan Zendejas sent at 3/15/2022 11:33 AM CDT -----  Contact: self  Patient called and stated that Rob[facility] Mukherjee stated that the wrong script was sent she dont need the starter pack. Please call [facility] [address] to verify script. Patient ask for a call back as well at [phone]

## 2022-03-19 LAB
ALBUMIN SERPL-MCNC: 4.3 G/DL (ref 3.6–5.1)
ALBUMIN/GLOB SERPL: 1.4 (CALC) (ref 1–2.5)
ALP SERPL-CCNC: 115 U/L (ref 37–153)
ALT SERPL-CCNC: 18 U/L (ref 6–29)
AST SERPL-CCNC: 16 U/L (ref 10–35)
BASOPHILS # BLD AUTO: 106 CELLS/UL (ref 0–200)
BASOPHILS NFR BLD AUTO: 0.7 %
BILIRUB SERPL-MCNC: 0.4 MG/DL (ref 0.2–1.2)
BUN SERPL-MCNC: 18 MG/DL (ref 7–25)
BUN/CREAT SERPL: ABNORMAL (CALC) (ref 6–22)
CALCIUM SERPL-MCNC: 9.9 MG/DL (ref 8.6–10.4)
CHLORIDE SERPL-SCNC: 101 MMOL/L (ref 98–110)
CHOLEST SERPL-MCNC: 168 MG/DL
CHOLEST/HDLC SERPL: 3.4 (CALC)
CO2 SERPL-SCNC: 30 MMOL/L (ref 20–32)
CREAT SERPL-MCNC: 0.98 MG/DL (ref 0.5–1.05)
EOSINOPHIL # BLD AUTO: 257 CELLS/UL (ref 15–500)
EOSINOPHIL NFR BLD AUTO: 1.7 %
ERYTHROCYTE [DISTWIDTH] IN BLOOD BY AUTOMATED COUNT: 15.8 % (ref 11–15)
GLOBULIN SER CALC-MCNC: 3.1 G/DL (CALC) (ref 1.9–3.7)
GLUCOSE SERPL-MCNC: 158 MG/DL (ref 65–99)
HBA1C MFR BLD: 9.6 % OF TOTAL HGB
HCT VFR BLD AUTO: 39.5 % (ref 35–45)
HDLC SERPL-MCNC: 49 MG/DL
HGB BLD-MCNC: 13.2 G/DL (ref 11.7–15.5)
LDLC SERPL CALC-MCNC: 88 MG/DL (CALC)
LYMPHOCYTES # BLD AUTO: 3186 CELLS/UL (ref 850–3900)
LYMPHOCYTES NFR BLD AUTO: 21.1 %
MCH RBC QN AUTO: 27.6 PG (ref 27–33)
MCHC RBC AUTO-ENTMCNC: 33.4 G/DL (ref 32–36)
MCV RBC AUTO: 82.6 FL (ref 80–100)
MONOCYTES # BLD AUTO: 1525 CELLS/UL (ref 200–950)
MONOCYTES NFR BLD AUTO: 10.1 %
NEUTROPHILS # BLD AUTO: ABNORMAL CELLS/UL (ref 1500–7800)
NEUTROPHILS NFR BLD AUTO: 66.4 %
NONHDLC SERPL-MCNC: 119 MG/DL (CALC)
PLATELET # BLD AUTO: 356 THOUSAND/UL (ref 140–400)
PMV BLD REES-ECKER: 11.1 FL (ref 7.5–12.5)
POTASSIUM SERPL-SCNC: 4.6 MMOL/L (ref 3.5–5.3)
PROT SERPL-MCNC: 7.4 G/DL (ref 6.1–8.1)
RBC # BLD AUTO: 4.78 MILLION/UL (ref 3.8–5.1)
SODIUM SERPL-SCNC: 140 MMOL/L (ref 135–146)
TRIGL SERPL-MCNC: 214 MG/DL
TSH SERPL-ACNC: 3.14 MIU/L
WBC # BLD AUTO: 15.1 THOUSAND/UL (ref 3.8–10.8)

## 2022-03-21 LAB
APPEARANCE UR: CLEAR
BACTERIA #/AREA URNS HPF: ABNORMAL /HPF
BACTERIA UR CULT: ABNORMAL
BILIRUB UR QL STRIP: NEGATIVE
COLOR UR: YELLOW
GLUCOSE UR QL STRIP: ABNORMAL
HGB UR QL STRIP: NEGATIVE
HYALINE CASTS #/AREA URNS LPF: ABNORMAL /LPF
KETONES UR QL STRIP: NEGATIVE
LEUKOCYTE ESTERASE UR QL STRIP: ABNORMAL
NITRITE UR QL STRIP: POSITIVE
PH UR STRIP: 7 [PH] (ref 5–8)
PROT UR QL STRIP: NEGATIVE
RBC #/AREA URNS HPF: ABNORMAL /HPF
SP GR UR STRIP: 1.02 (ref 1–1.03)
SQUAMOUS #/AREA URNS HPF: ABNORMAL /HPF
WBC #/AREA URNS HPF: ABNORMAL /HPF
YEAST #/AREA URNS HPF: ABNORMAL /HPF

## 2022-03-22 ENCOUNTER — TELEPHONE (OUTPATIENT)
Dept: PRIMARY CARE CLINIC | Facility: CLINIC | Age: 57
End: 2022-03-22
Payer: MEDICARE

## 2022-03-22 DIAGNOSIS — N30.00 ACUTE CYSTITIS WITHOUT HEMATURIA: Primary | ICD-10-CM

## 2022-03-22 RX ORDER — NITROFURANTOIN 25; 75 MG/1; MG/1
100 CAPSULE ORAL 2 TIMES DAILY
Qty: 10 CAPSULE | Refills: 0 | Status: SHIPPED | OUTPATIENT
Start: 2022-03-22 | End: 2022-08-23

## 2022-03-22 NOTE — TELEPHONE ENCOUNTER
----- Message from Yaima Pulliam NP sent at 3/22/2022  1:13 PM CDT -----  Please notify patient her urine shows she has a UTI so I have ordered an antibiotic for her to take twice daily for 5 days. Her A1C is elevated at 9.6 so be sure to take all of her diabetes medications daily and eat a low carb, low sugar diet to keep her blood sugar better controlled. Her cholesterol is elevated as well so as stated earlier, eat a healthy diet and exercise regularly. We will discuss her lab results in detail at her next visit.

## 2022-03-22 NOTE — PROGRESS NOTES
Please notify patient her urine shows she has a UTI so I have ordered an antibiotic for her to take twice daily for 5 days. Her A1C is elevated at 9.6 so be sure to take all of her diabetes medications daily and eat a low carb, low sugar diet to keep her blood sugar better controlled. Her cholesterol is elevated as well so as stated earlier, eat a healthy diet and exercise regularly. We will discuss her lab results in detail at her next visit.

## 2022-03-25 ENCOUNTER — CLINICAL SUPPORT (OUTPATIENT)
Dept: SMOKING CESSATION | Facility: CLINIC | Age: 57
End: 2022-03-25
Payer: COMMERCIAL

## 2022-03-25 DIAGNOSIS — F17.200 NICOTINE DEPENDENCE: Primary | ICD-10-CM

## 2022-03-25 PROCEDURE — 99404 PR PREVENT COUNSEL,INDIV,60 MIN: ICD-10-PCS | Mod: S$GLB,,, | Performed by: GENERAL PRACTICE

## 2022-03-25 PROCEDURE — 99404 PREV MED CNSL INDIV APPRX 60: CPT | Mod: S$GLB,,, | Performed by: GENERAL PRACTICE

## 2022-03-25 RX ORDER — VARENICLINE TARTRATE 1 MG/1
1 TABLET, FILM COATED ORAL 2 TIMES DAILY
Qty: 56 TABLET | Refills: 0 | Status: SHIPPED | OUTPATIENT
Start: 2022-03-25 | End: 2022-04-19 | Stop reason: DRUGHIGH

## 2022-03-25 RX ORDER — VARENICLINE TARTRATE 0.5 MG/1
TABLET, FILM COATED ORAL
Qty: 11 TABLET | Refills: 0 | Status: SHIPPED | OUTPATIENT
Start: 2022-03-25 | End: 2022-06-21 | Stop reason: DRUGHIGH

## 2022-03-25 NOTE — PROGRESS NOTES
Note: Patient's primary care doctor started her on Chantix and found it to be effective.  Patient was unable to receive refills and requested additional refills through the smoking cessation program.

## 2022-04-01 ENCOUNTER — CLINICAL SUPPORT (OUTPATIENT)
Dept: SMOKING CESSATION | Facility: CLINIC | Age: 57
End: 2022-04-01
Payer: COMMERCIAL

## 2022-04-01 DIAGNOSIS — F17.200 NICOTINE DEPENDENCE: Primary | ICD-10-CM

## 2022-04-01 PROCEDURE — 99402 PR PREVENT COUNSEL,INDIV,30 MIN: ICD-10-PCS | Mod: S$GLB,,, | Performed by: GENERAL PRACTICE

## 2022-04-01 PROCEDURE — 99402 PREV MED CNSL INDIV APPRX 30: CPT | Mod: S$GLB,,, | Performed by: GENERAL PRACTICE

## 2022-04-01 NOTE — PROGRESS NOTES
Individual Follow-Up Form    4/1/2022    Clinical Status of Patient: Outpatient    Length of Service: 30 minutes    Continuing Medication: yes  Chantix     Target Symptoms: Withdrawal and medication side effects. The following were  rated moderate (3) to severe (4) on TCRS:  · Moderate (3): none reported  · Severe (4):none reported    Comments: Spoke with patient at length via phone regarding tobacco cessation progress. Patient remains on prescribed tobacco cessation medication Chantix without any negative side effects at this time.  Patient decreased to 17 cigarettes daily.  Patient will cut down to 16 cigarettes a day this week.  Counselor reviewed strategies, cues, and triggers. Introduced the negative impact of tobacco on health, the health advantages of discontinuing the use of tobacco, time line improved health changes after a quit, withdrawal issues to expect from nicotine and habit, and ways to achieve the goal of a quit.  Patient appeared to be receptive to the information given.  Counselor will continue to motivate patient to be tobacco free.    Diagnosis: F17.200    Next Visit: 1 week

## 2022-04-08 ENCOUNTER — CLINICAL SUPPORT (OUTPATIENT)
Dept: SMOKING CESSATION | Facility: CLINIC | Age: 57
End: 2022-04-08
Payer: COMMERCIAL

## 2022-04-08 DIAGNOSIS — F17.200 NICOTINE DEPENDENCE: Primary | ICD-10-CM

## 2022-04-08 PROCEDURE — 99404 PR PREVENT COUNSEL,INDIV,60 MIN: ICD-10-PCS | Mod: S$GLB,,, | Performed by: GENERAL PRACTICE

## 2022-04-08 PROCEDURE — 99404 PREV MED CNSL INDIV APPRX 60: CPT | Mod: S$GLB,,, | Performed by: GENERAL PRACTICE

## 2022-04-08 RX ORDER — ASPIRIN/CALCIUM CARB/MAGNESIUM 325 MG
4 TABLET ORAL
Qty: 216 LOZENGE | Refills: 0 | Status: SHIPPED | OUTPATIENT
Start: 2022-04-08 | End: 2022-05-31 | Stop reason: SDUPTHER

## 2022-04-08 NOTE — PROGRESS NOTES
Individual Follow-Up Form    4/8/2022    Clinical Status of Patient: Outpatient    Length of Service: 60 minutes    Continuing Medication: yes  Chantix     Target Symptoms: Withdrawal and medication side effects. The following were  rated moderate (3) to severe (4) on TCRS:  · Moderate (3): none reported  · Severe (4): none reported    Comments: Spoke with patient at length in clinic regarding tobacco cessation progress. Patient remains on prescribed tobacco cessation medication Chantix without any negative side effects at this time.  Patient remains smoking 17 cigarettes per day.  Patient will cut down to 15 cigarettes a day this week.  Counselor reviewed strategies, habitual behavior, high risks situations, understanding urges and cravings, stress and relaxation with open discussion and additional interventions, Introduced lapses, relapses, understanding them and analyzing the situation of a lapse, conflict issues that may be linked to a lapse.  Counselor suggested that patient drink a glass of water after eating a meal and wait 30 mins before deciding to smoke a cigarette to help eliminate smoking those cigarettes.  Counselor also suggested that patient use nicotine lozenges to address morning cravings.  Counselor submitted prescription 2mg nicotine lozenges.  Counselor also gave patient mints to help suppress nicotine cravings.  Patient plans to use cinnamon toothpicks to help address the hand to mouth habit associated with smoking.  Patient appeared to be receptive to the information given.  Counselor will continue to motivate patient to be tobacco free.    Diagnosis: F17.200    Next Visit: 1 week

## 2022-04-11 ENCOUNTER — CLINICAL SUPPORT (OUTPATIENT)
Dept: SMOKING CESSATION | Facility: CLINIC | Age: 57
End: 2022-04-11
Payer: COMMERCIAL

## 2022-04-11 DIAGNOSIS — F17.200 NICOTINE DEPENDENCE: Primary | ICD-10-CM

## 2022-04-11 PROCEDURE — 99402 PREV MED CNSL INDIV APPRX 30: CPT | Mod: S$GLB,,, | Performed by: GENERAL PRACTICE

## 2022-04-11 PROCEDURE — 99402 PR PREVENT COUNSEL,INDIV,30 MIN: ICD-10-PCS | Mod: S$GLB,,, | Performed by: GENERAL PRACTICE

## 2022-04-13 NOTE — PROGRESS NOTES
Individual Follow-Up Form    4/11/2022    Clinical Status of Patient: Outpatient    Length of Service: 30 minutes    Continuing Medication: yes  Chantix    Other Medications: Nicotine Lozenges     Target Symptoms: Withdrawal and medication side effects. The following were  rated moderate (3) to severe (4) on TCRS:  · Moderate (3): none reported  · Severe (4): none reported    Comments: Spoke with patient at length via phone regarding tobacco cessation progress. Patient remains on prescribed tobacco cessation medication Chantix and 2mg lozenges without any negative side effects at this time.  Patient decreased to 15 cigarettes daily.  Patient will cut down to 14 cigarettes a day this week.  Counselor reviewed strategies, habitual behavior, high risks situations, understanding urges and cravings, stress and relaxation with open discussion and additional interventions, Introduced lapses, relapses, understanding them and analyzing the situation of a lapse, conflict issues that may be linked to a lapse.  Counselor reviewed over the positive affects of taking Chantix and how it affects the body.  Counselor encouraged patient to exercise and participate in healthy activities to help distract her from smoking.  Patient appeared to be receptive to the information given.  Counselor will continue to motivate patient to be tobacco free.    Diagnosis: F17.200    Next Visit: 1 week

## 2022-04-14 ENCOUNTER — TELEPHONE (OUTPATIENT)
Dept: PRIMARY CARE CLINIC | Facility: CLINIC | Age: 57
End: 2022-04-14
Payer: MEDICARE

## 2022-04-14 NOTE — TELEPHONE ENCOUNTER
----- Message from Giuliana Patrick sent at 4/14/2022  1:59 PM CDT -----  DR AICHA DUGAN NEEDS PT RECENT LAB RESULTS FAXED....[phone]

## 2022-04-19 ENCOUNTER — CLINICAL SUPPORT (OUTPATIENT)
Dept: SMOKING CESSATION | Facility: CLINIC | Age: 57
End: 2022-04-19
Payer: COMMERCIAL

## 2022-04-19 DIAGNOSIS — F17.200 NICOTINE DEPENDENCE: ICD-10-CM

## 2022-04-19 PROCEDURE — 99404 PREV MED CNSL INDIV APPRX 60: CPT | Mod: S$GLB,,, | Performed by: GENERAL PRACTICE

## 2022-04-19 PROCEDURE — 99404 PR PREVENT COUNSEL,INDIV,60 MIN: ICD-10-PCS | Mod: S$GLB,,, | Performed by: GENERAL PRACTICE

## 2022-04-19 RX ORDER — VARENICLINE TARTRATE 1 MG/1
1 TABLET, FILM COATED ORAL 2 TIMES DAILY
Qty: 56 TABLET | Refills: 0 | Status: SHIPPED | OUTPATIENT
Start: 2022-04-19 | End: 2022-05-20 | Stop reason: SDUPTHER

## 2022-04-19 NOTE — PROGRESS NOTES
Individual Follow-Up Form    4/19/2022     Clinical Status of Patient: Outpatient    Length of Service: 60 minutes    Continuing Medication: yes  Chantix    Other Medications: Nicotine Lozenges     Target Symptoms: Withdrawal and medication side effects. The following were  rated moderate (3) to severe (4) on TCRS:  · Moderate (3): none reported  · Severe (4): none reported    Comments: Spoke with patient at length in clinic regarding tobacco cessation progress. Patient remains on prescribed tobacco cessation medication 2mg nicotine lozenges and Chantix without any negative side effects at this time.  Patient decreased to 15 cigarettes daily.  Patient will cut down to 13 cigarettes a day this week.  Counselor reviewed strategies, habitual behavior, high risks situations, understanding urges and cravings, stress and relaxation with open discussion and additional interventions, Introduced lapses, relapses, understanding them and analyzing the situation of a lapse, conflict issues that may be linked to a lapse.  Counselor discussed addressing the hand to mouth habit that's associated with smoking.  Counselor suggested that patient try eating popcorn and sunflower seeds one at a time.  Counselor also suggested that patient utilize dum dum lollipops and cinnamon toothpicks to help avoid smoking.  Counselor reviewed over the positive affects of using Chantix to help quit smoking.  Counselor submitted a refill request for 1mg Chantix prescription.  Patient appeared to be receptive to the information given.  Counselor will continue to motivate patient to be tobacco free.    Diagnosis: F17.200    Next Visit: 1 week

## 2022-04-29 ENCOUNTER — TELEPHONE (OUTPATIENT)
Dept: SMOKING CESSATION | Facility: CLINIC | Age: 57
End: 2022-04-29
Payer: MEDICARE

## 2022-04-29 NOTE — TELEPHONE ENCOUNTER
Attempted to contact patient regarding scheduled follow up appointment.  Patient did not answer.  Counselor left a voice message with contact information.

## 2022-05-09 ENCOUNTER — TELEPHONE (OUTPATIENT)
Dept: SMOKING CESSATION | Facility: CLINIC | Age: 57
End: 2022-05-09
Payer: MEDICARE

## 2022-05-09 NOTE — TELEPHONE ENCOUNTER
Attempted to contact patient regarding missed [facility] appointment.  Patient did not answer.  Counselor left a voice message with contact information.

## 2022-05-10 ENCOUNTER — TELEPHONE (OUTPATIENT)
Dept: SMOKING CESSATION | Facility: CLINIC | Age: 57
End: 2022-05-10
Payer: MEDICARE

## 2022-05-10 NOTE — TELEPHONE ENCOUNTER
Spoke with patient regarding rescheduling missed [facility] appointment.  Patient agreed to reschedule patient's appointment for 5/13/2022 @ 8AM.

## 2022-05-11 ENCOUNTER — OFFICE VISIT (OUTPATIENT)
Dept: PRIMARY CARE CLINIC | Facility: CLINIC | Age: 57
End: 2022-05-11
Payer: MEDICARE

## 2022-05-11 VITALS
HEIGHT: 65 IN | WEIGHT: 267.38 LBS | DIASTOLIC BLOOD PRESSURE: 73 MMHG | OXYGEN SATURATION: 98 % | HEART RATE: 81 BPM | BODY MASS INDEX: 44.55 KG/M2 | SYSTOLIC BLOOD PRESSURE: 119 MMHG

## 2022-05-11 DIAGNOSIS — E03.8 OTHER SPECIFIED HYPOTHYROIDISM: ICD-10-CM

## 2022-05-11 DIAGNOSIS — F17.200 CURRENT EVERY DAY SMOKER: ICD-10-CM

## 2022-05-11 DIAGNOSIS — E78.49 OTHER HYPERLIPIDEMIA: ICD-10-CM

## 2022-05-11 DIAGNOSIS — E11.9 TYPE 2 DIABETES MELLITUS WITHOUT COMPLICATION, WITHOUT LONG-TERM CURRENT USE OF INSULIN: ICD-10-CM

## 2022-05-11 DIAGNOSIS — I10 BENIGN ESSENTIAL HTN: Primary | ICD-10-CM

## 2022-05-11 DIAGNOSIS — E66.01 MORBID OBESITY WITH BMI OF 40.0-44.9, ADULT: ICD-10-CM

## 2022-05-11 PROCEDURE — 1159F PR MEDICATION LIST DOCUMENTED IN MEDICAL RECORD: ICD-10-PCS | Mod: CPTII,S$GLB,, | Performed by: NURSE PRACTITIONER

## 2022-05-11 PROCEDURE — 4010F PR ACE/ARB THEARPY RXD/TAKEN: ICD-10-PCS | Mod: CPTII,S$GLB,, | Performed by: NURSE PRACTITIONER

## 2022-05-11 PROCEDURE — 3008F BODY MASS INDEX DOCD: CPT | Mod: CPTII,S$GLB,, | Performed by: NURSE PRACTITIONER

## 2022-05-11 PROCEDURE — 3074F SYST BP LT 130 MM HG: CPT | Mod: CPTII,S$GLB,, | Performed by: NURSE PRACTITIONER

## 2022-05-11 PROCEDURE — 3078F PR MOST RECENT DIASTOLIC BLOOD PRESSURE < 80 MM HG: ICD-10-PCS | Mod: CPTII,S$GLB,, | Performed by: NURSE PRACTITIONER

## 2022-05-11 PROCEDURE — 3008F PR BODY MASS INDEX (BMI) DOCUMENTED: ICD-10-PCS | Mod: CPTII,S$GLB,, | Performed by: NURSE PRACTITIONER

## 2022-05-11 PROCEDURE — 3074F PR MOST RECENT SYSTOLIC BLOOD PRESSURE < 130 MM HG: ICD-10-PCS | Mod: CPTII,S$GLB,, | Performed by: NURSE PRACTITIONER

## 2022-05-11 PROCEDURE — 1160F PR REVIEW ALL MEDS BY PRESCRIBER/CLIN PHARMACIST DOCUMENTED: ICD-10-PCS | Mod: CPTII,S$GLB,, | Performed by: NURSE PRACTITIONER

## 2022-05-11 PROCEDURE — 1159F MED LIST DOCD IN RCRD: CPT | Mod: CPTII,S$GLB,, | Performed by: NURSE PRACTITIONER

## 2022-05-11 PROCEDURE — 1160F RVW MEDS BY RX/DR IN RCRD: CPT | Mod: CPTII,S$GLB,, | Performed by: NURSE PRACTITIONER

## 2022-05-11 PROCEDURE — 99214 PR OFFICE/OUTPT VISIT, EST, LEVL IV, 30-39 MIN: ICD-10-PCS | Mod: S$GLB,,, | Performed by: NURSE PRACTITIONER

## 2022-05-11 PROCEDURE — 4010F ACE/ARB THERAPY RXD/TAKEN: CPT | Mod: CPTII,S$GLB,, | Performed by: NURSE PRACTITIONER

## 2022-05-11 PROCEDURE — 99214 OFFICE O/P EST MOD 30 MIN: CPT | Mod: S$GLB,,, | Performed by: NURSE PRACTITIONER

## 2022-05-11 PROCEDURE — 3046F PR MOST RECENT HEMOGLOBIN A1C LEVEL > 9.0%: ICD-10-PCS | Mod: CPTII,S$GLB,, | Performed by: NURSE PRACTITIONER

## 2022-05-11 PROCEDURE — 3078F DIAST BP <80 MM HG: CPT | Mod: CPTII,S$GLB,, | Performed by: NURSE PRACTITIONER

## 2022-05-11 PROCEDURE — 3046F HEMOGLOBIN A1C LEVEL >9.0%: CPT | Mod: CPTII,S$GLB,, | Performed by: NURSE PRACTITIONER

## 2022-05-11 RX ORDER — LANCETS 28 GAUGE
EACH MISCELLANEOUS
COMMUNITY
Start: 2022-04-15

## 2022-05-11 RX ORDER — LEVOTHYROXINE SODIUM 88 UG/1
88 TABLET ORAL
Qty: 30 TABLET | Refills: 2 | Status: SHIPPED | OUTPATIENT
Start: 2022-05-11 | End: 2022-08-23 | Stop reason: SDUPTHER

## 2022-05-11 NOTE — PATIENT INSTRUCTIONS
"RTC in 3 months for F/U or sooner if needed.    Keep appts with specialists as scheduled.    Instructed patient to report to nearest ER or call 911 if begins to have difficulty breathing, turning blue, chest pain, B/P < 80/60 or >170/100, palpitations, syncope, extreme weakness, or severe H/A. Patient verbalized understanding.      Patient Education       Diabetes and Diet   The Basics   Written by the doctors and editors at [facility]   Why is diet important in diabetes? -- Diet is important because it is part of diabetes treatment. Many people need to change what they eat and how much they eat to help treat their diabetes. It is important for people to treat their diabetes so that they:  Keep their blood sugar at or near a normal level  Prevent long-term problems, such as heart or kidney problems, that can happen in people with diabetes  Changing your diet can also help treat obesity, high blood pressure, and high cholesterol. These conditions can affect people with diabetes and can lead to future problems, such as heart attacks or strokes.  Who will work with me to change my diet? -- Your doctor or nurse will work with you to make a food plan to change your diet. They might also recommend that you work with a "dietitian." A dietitian is an expert on food and eating.  Do I need to eat at the same times every day? -- When and how often you should eat depends, in part, on the diabetes medicines you take. For example:  People who take about the same amount of insulin at the same time each day (called a "fixed regimen") should eat meals at the same times. This is also true for people who take pills that increase insulin levels, such as sulfonylureas. Eating meals at the same time every day helps prevent low blood sugar.  People who adjust the dose and timing of their insulin each day (called a "flexible regimen") do not always have to eat meals at the same time. That's because they can time their insulin dose for before " "they plan to eat, and also adjust the dose for how much they plan to eat.  People who take medicines that don't usually cause low blood sugar, such as metformin, don't have to eat meals at the same time every day.  What do I need to think about when planning what to eat? -- Our bodies break down the food we eat into small pieces called carbohydrates, proteins, and fats.  When planning what to eat, people with diabetes need to think about:  Carbohydrates (or "carbs") - Carbohydrates, which are sugars that our bodies use for energy, can raise a person's blood sugar level. Your doctor, nurse, or dietitian will tell you how many carbohydrates you should eat at each meal or snack. Foods that have carbohydrates include:  Bread, pasta, and rice  Vegetables and fruits  Dairy foods  Foods and drinks with added sugar  It is best to get your carbohydrates from fruits, vegetables, whole grains, and low-fat milk. It is best to avoid drinks with added sugar, like soda, juices, and sports drinks.   Protein - Your doctor, nurse, or dietitian will tell you how much protein you should eat each day. It is best to eat lean meats, fish, eggs, beans, peas, soy products, nuts, and seeds.  Fats - The type of fat you eat is more important than the amount of fat. "Saturated" and "trans" fats can increase your risk for heart problems, like a heart attack.  Foods that have saturated fats include meat, butter, cheese, and ice cream.  Foods that have trans fats include processed food with "partially hydrogenated oils" on the ingredient list. This may include fried foods, store bought cookies, muffins, pies, and cakes.  "Monounsaturated" and "polyunsaturated" fats are better for you. Foods with these types of fat include fish, avocado, olive oil, and nuts.  Calories - People need to eat a certain amount of calories each day to keep their weight the same. People who are overweight and want to lose weight need to eat fewer calories each day.  Fiber " - Eating foods with a lot of fiber can help control a person's blood sugar level. Foods that have a lot of fiber include apples, green beans, peas, beans, lentils, nuts, oatmeal, and whole grains.  Salt - People who have high blood pressure should not eat foods that contain a lot of salt (also called sodium). People with high blood pressure should also eat healthy foods, such as fruits, vegetables, and low-fat dairy foods.  Alcohol - Having more than 1 drink (for women) or 2 drinks (for men) a day can raise blood sugar levels. Also, drinks that have fruit juice or soda in them can raise blood sugar levels.  What can I do if I need to lose weight? -- If you need to lose weight, you can:  Exercise - Try to get at least 30 minutes of physical activity a day, most days of the week. Even gentle exercise, like walking, is good for your health. Some people with diabetes need to change their medicine dose before they exercise. They might also need to check their blood sugar levels before and after exercising.  Eat fewer calories - Your doctor, nurse, or dietitian can tell you how many calories you should eat each day in order to lose weight.  If you are worried about your weight, size, or shape, talk with your doctor, nurse, or dietitian. They can help you make changes to improve your health.  Can I eat the same foods as my family? -- Yes. You do not need to eat special foods if you have diabetes. You and your family can eat the same foods. Changing your diet is mostly about eating healthy foods and not eating too much.  What are the other parts of diabetes treatment? -- Besides changing your diet, the other parts of diabetes treatment are:  Exercise  Medicines  Some people with diabetes need to learn how to match their diet and exercise with their medicine dose. For example, people who use insulin might need to choose the dose of insulin they give themselves. To choose their dose, they need to think about:  What they plan  to eat at the next meal  How much exercise they plan to do  What their blood sugar level is  If the diet and exercise do not match the medicine dose, a person's blood sugar level can get too low or too high. Blood sugar levels that are too low or too high can cause problems.  All topics are updated as new evidence becomes available and our peer review process is complete.  This topic retrieved from [employer] on: Sep 21, 2021.  Topic 25253 Version 7.0  Release: 29.4.2 - C29.263  © 2021 UpToDate, Inc. and/or its affiliates. All rights reserved.  Consumer Information Use and Disclaimer   This information is not specific medical advice and does not replace information you receive from your health care provider. This is only a brief summary of general information. It does NOT include all information about conditions, illnesses, injuries, tests, procedures, treatments, therapies, discharge instructions or life-style choices that may apply to you. You must talk with your health care provider for complete information about your health and treatment options. This information should not be used to decide whether or not to accept your health care provider's advice, instructions or recommendations. Only your health care provider has the knowledge and training to provide advice that is right for you. The use of this information is governed by the [employer] End User License Agreement, available at [email or web address]://[email or web address]/solutions/[email or web address]/bhargav.The use of [employer] content is governed by the [employer] Terms of Use. ©2021 UpToDate, Inc. All rights reserved.  Copyright   © 2021 UpToDate, Inc. and/or its affiliates. All rights reserved.  Patient Education       Controlling Your Blood Pressure Through Lifestyle   The Basics   Written by the doctors and editors at [employer]   What does my lifestyle have to do with my blood pressure? -- The things you do and the foods you eat have a big effect on your blood pressure and your  "overall health. Following the right lifestyle can:  Lower your blood pressure or keep you from getting high blood pressure in the first place  Reduce your need for blood pressure medicines  Make medicines for high blood pressure work better, if you do take them  Lower the chances that you'll have a heart attack or stroke, or develop kidney disease  Which lifestyle choices will help lower my blood pressure? -- Here's what you can do:  Lose weight (if you are overweight)  Choose a diet rich in fruits, vegetables, and low-fat dairy products, and low in meats, sweets, and refined grains  Eat less salt (sodium)  Do something active for at least 30 minutes a day on most days of the week  Limit the amount of alcohol you drink  If you have high blood pressure, it's also very important to quit smoking (if you smoke). Quitting smoking might not bring your blood pressure down. But it will lower the chances that you'll have a heart attack or stroke, and it will help you feel better and live longer.  Start low and go slow -- The changes listed above might sound like a lot, but don't worry. You don't have to change everything all at once. The key to improving your lifestyle is to "start low and go slow." Choose 1 small, specific thing to change and try doing it for a while. If it works for you, keep doing it until it becomes a habit. If it doesn't, don't give up. Choose something else to change and see how that goes.  Let's say, for example, that you would like to improve your diet. If you're the type of person who eats cheeseburgers and French fries all the time, you can't switch to eating just salads from one day to the next. When people try to make changes like that, they often fail. Then they feel frustrated and tend to give up. So instead of trying to change everything about your diet in 1 day, change 1 or 2 small things about your diet and give yourself time to get used to those changes. For instance, keep the cheeseburger " "but give up the French fries. Or eat the same things but cut your portions in half.  As you find things that you are able to change and stick with, keep adding new changes. In time, you will see that you can actually change a lot. You just have to get used to the changes slowly.  Lose weight -- When people think about losing weight, they sometimes make it more complicated than it really is. To lose weight, you have to either eat less or move more. If you do both of those things, it's even better. But there is no single weight-loss diet or activity that's better than any other. When it comes to weight loss, the most effective plan is the one that you'll stick with.  Improve your diet -- There is no single diet that is right for everyone. But in general, a healthy diet can include:  Lots of fruits, vegetables, and whole grains  Some beans, peas, lentils, chickpeas, and similar foods  Some nuts, such as walnuts, almonds, and peanuts  Fat-free or low-fat milk and milk products  Some fish  To have a healthy diet, it's also important to limit or avoid sugar, sweets, meats, and refined grains. (Refined grains are found in white bread, white rice, most forms of pasta, and most packaged "snack" foods.)  Reduce salt -- Many people think that eating a low-sodium diet means avoiding the salt shaker and not adding salt when cooking. The truth is, not adding salt at the table or when you cook will only help a little. Almost all of the sodium you eat is already in the food you buy at the grocery store or at restaurants (figure 1).  The most important thing you can do to cut down on sodium is to eat less processed food. That means that you should avoid most foods that are sold in cans, boxes, jars, and bags. You should also eat in restaurants less often.  To reduce the amount of sodium you get, buy fresh or fresh-frozen fruits, vegetables, and meats. (Fresh-frozen foods have had nothing added to them before freezing.) Then you can " "make meals at home, from scratch, with these ingredients.  As with the other changes, don't try to cut out salt all at once. Instead, choose 1 or 2 foods that have a lot of sodium and try to replace them with low-sodium choices. When you get used to those low-sodium options, find another food or 2 to change. Then keep going, until all the foods you eat are sodium-free or low in sodium.  Become more active -- If you want to be more active, you don't have to go to the gym or get all sweaty. It is possible to increase your activity level while doing everyday things you enjoy. Walking, gardening, and dancing are just a few of the things that you might try. As with all the other changes, the key is not to do too much too fast. If you don't do any activity now, start by walking for just a few minutes every other day. Do that for a few weeks. If you stick with it, try doing it for longer. But if you find that you don't like walking, try a different activity.  Drink less alcohol -- If you are a woman, do not have more than 1 "standard drink" of alcohol a day. If you are a man, do not have more than 2. A "standard drink" is:  A can or bottle that has 12 ounces of beer  A glass that has 5 ounces of wine  A shot that has 1.5 ounces of whiskey  Where should I start? -- If you want to improve your lifestyle, start by making the changes that you think would be easiest for you. If you used to exercise and just got out of the habit, maybe it would be easy for you to start exercising again. Or if you actually like cooking meals from scratch, maybe the first thing you should focus on is eating home-cooked meals that are low in sodium.  Whatever you tackle first, choose specific, realistic goals, and give yourself a deadline. For example, do not decide that you are going to "exercise more." Instead, decide that you are going to walk for 10 minutes on Monday, Wednesday, and Friday, and that you are going to do this for the next 2 " weeks.  When lifestyle changes are too general, people have a hard time following through.  Now go. You can do it!  All topics are updated as new evidence becomes available and our peer review process is complete.  This topic retrieved from [employer] on: Sep 21, 2021.  Topic 75337 Version 8.0  Release: 29.4.2 - C29.263  © 2021 UpToDate, Inc. and/or its affiliates. All rights reserved.  figure 1: Sources of sodium in your diet     Graphic 17704 Version 2.0    Consumer Information Use and Disclaimer   This information is not specific medical advice and does not replace information you receive from your health care provider. This is only a brief summary of general information. It does NOT include all information about conditions, illnesses, injuries, tests, procedures, treatments, therapies, discharge instructions or life-style choices that may apply to you. You must talk with your health care provider for complete information about your health and treatment options. This information should not be used to decide whether or not to accept your health care provider's advice, instructions or recommendations. Only your health care provider has the knowledge and training to provide advice that is right for you. The use of this information is governed by the [employer] End User License Agreement, available at [email or web address].The use of [employer] content is governed by the [employer] Terms of Use. ©2021 UpToDate, Inc. All rights reserved.  Copyright   © 2021 UpToDate, Inc. and/or its affiliates. All rights reserved.  Patient Education       Low Cholesterol, Saturated Fat, and Trans Fat Diet   About this topic   Cholesterol, saturated fat, and trans fat are in many foods. These may raise your blood cholesterol levels. If your cholesterol is too high, this can cause health problems in your heart, liver, kidneys, and even your eyes. The key to lowering your risk of heart problems is to  "lower your bad fat intake.  Saturated fats and trans fats are the bad fats. These fats clog your arteries and raise your bad cholesterol. Saturated fats and trans fats are solid fats at room temperature. Saturated fats are animal fats. Trans fats are manmade fats. They add flavor to a lot of packaged foods. Staying away from saturated and trans fats will help your heart.  When you do eat foods with fat, make sure they have the good fats. Monounsaturated and polyunsaturated fats are good fats. These fats help raise your good cholesterol and protect your heart.  General   How to Lower Fat and Cholesterol in Your Diet   Read the labels of the foods you buy from the market to find out how much fat is present. Under 5% of total fat on a label means it is "low fat". Over 20% of total fat on a label means it is high fat.  Eat high fiber foods, like soluble fiber. This type of fiber helps lower cholesterol in the body. Choose oatmeal, fruits (like apples), beans, and nuts to get the most soluble fiber.  Eat foods high in omega-3 fatty acids like gopal seeds, walnuts, salmon, tuna, trout, herring, flaxseed, and soybeans. These foods help keep the heart healthy.  Limit your bad fat and oil intake.  Stay away from butter, stick margarine, shortening, lard, and palm and coconut oil. Pick plant-based spreads instead.  Limit mayonnaise, salad dressings, gravies, and sauces, unless it is made from low-fat ingredients.  Limit chocolate.  Do not eat high-fat processed foods like hot dogs, feliciano, sausage, ham and other luncheon meats high in fat, and some frozen foods. Pick fish, chicken, turkey, and lean meats instead.  Eat more dried beans, lentils, and tofu to get your protein.  Do not eat organ meats, like liver.  Choose nonfat or low-fat milk, yogurt, and cheese.  Use light or fat-free cream cheese and sour cream.  Eat lots of fruits and vegetables.  Pick whole grain breads, cereals, pastas, and rice.  Do not eat snacks that " are high in fats like granola, cookies, pies, pastries, doughnuts, and croissants.  Stay away from deep fried foods.  Help When Cooking   Remove the fat portion of meats and the skin from poultry before cooking.  Bake, broil, grill, poach, or roast poultry, fish, and lean meats.  Drain and throw away the fat that drains out of meat as you cook it.  Try to add little or no fat to foods.  Use olive or canola oil for cooking or baking.  Steam your vegetables.  Use herbs or no-oil marinades to flavor foods.         Who should use this diet?   This diet is for people who are at high risk of getting health problems like heart disease, high blood pressure, diabetes, and others. This diet is also good for all people to follow to keep your heart healthy.  What foods are good to eat?   Foods with good fats are:  Canola, peanut, and olive oil  Safflower, soybean, and corn oil  Walnuts, almonds, cashews, and peanuts  Pumpkin and sunflower seeds  [place] and tuna  Tofu  Soymilk  Avocado  What foods should be limited or avoided?   Stay away from these types of foods that have saturated fats:  Whole fat dairy products like cheese, ice cream, whole milk, and cream  Palm and coconut oils  High-fat meats like beef, lamb, poultry with the skin, feliciano, and sausage  Butter and lard  Stay away from these types of foods that may have trans fat:  Cookies, cakes, candy, doughnuts, baked goods, muffins, pizza dough, and pie crusts that are packaged  Fried foods  Frozen dinners  Chips and crackers  Microwave popcorn  Stick margarine and vegetable shortenings  Helpful tips   To help stay away from saturated fat:  Pick lean cuts of meat  Take the skin off chicken and turkey or pick skinless  Pick low-fat cheese, milk, and ice cream  Use liquid oils when cooking and baking, such as olive oil and canola oil  To help stay away from trans fat:  Look at your labels. Choose foods with 0% trans fat. Read the ingredient list. Avoid foods with partially  hydrogenated oil in the ingredient list. This means there is trans fat in the product.  Where can I learn more?   American Heart Association   http://[email or web address]-Your-Fats_UC_305628_[email or web address]   Last Reviewed Date   2021-10-05  Consumer Information Use and Disclaimer   This information is not specific medical advice and does not replace information you receive from your health care provider. This is only a brief summary of general information. It does NOT include all information about conditions, illnesses, injuries, tests, procedures, treatments, therapies, discharge instructions or life-style choices that may apply to you. You must talk with your health care provider for complete information about your health and treatment options. This information should not be used to decide whether or not to accept your health care providers advice, instructions or recommendations. Only your health care provider has the knowledge and training to provide advice that is right for you.   Copyright   Copyright © 2021 UpToDate, Inc. and its affiliates and/or licensors. All rights reserved.

## 2022-05-11 NOTE — PROGRESS NOTES
Subjective:       Patient ID: Anca Grace is a 56 y.o. female.    Chief Complaint: Follow-up (Pt states that she has no energy)    HPI: Anca is a 56 y.o. female who presents for F/U and to discuss lab results.      Feels well today, only complaint is fatigue.    Was found to have a UTI on recent labs so was treated with antibiotics. Her A1C was elevated at 9.6 and also her cholesterol. All other labs were normal.    DM II -  Chronic and uncontrolled on meds, b/p averaging < 130/80, denies s/e or a/r. Followed by Walt David for Endocrinology for her DM Type 2 AND Hypothyroidism. Seen last month by Walt David and her Toujeo was increased to 140 units daily, Trulicity 4.5 weekly and Xigduo daily. Her blood sugars run between .  Has Neuropathy and she takes Lyrica for it which is prescribed by Orthopedic.    Hyperlipidemia - controlled with a STATIN. Denies se/ar.    Hypothyroidism - takes levothyroxine 75 mcg daily. TSH was 3.14 on recent labs and she has been fatigued on most days. Denies se/ar to medication.    Followed by Dr. Angel in [place]. Had a carpal tunnel surgery, scope to left knee last August and some of her bone was scraped for her arthritis. Follows up monthly for muscle relaxer injections. Also followed by Dr. Gallegos for lower back pain and he does back injections.    Has cut down on smoking from 1 ppd to 1/2 ppd for 15 years. Referred to smoking cessation and is getting Chantix. It is working well for her.     UTD with COVID and FLU vaccines.              Past Medical History:   Diagnosis Date    Diabetes mellitus, type 2     GERD (gastroesophageal reflux disease)     Hyperlipidemia     Hypertension        Past Surgical History:   Procedure Laterality Date    CARPAL TUNNEL RELEASE      HYSTERECTOMY         Family History   Problem Relation Age of Onset    Thyroiditis Mother     Heart disease Father     Graves' disease Sister     Heart disease Maternal Grandfather      Cancer Paternal Grandfather        Social History     Tobacco Use    Smoking status: Current Every Day Smoker     Types: Cigarettes    Smokeless tobacco: Never Used   Substance Use Topics    Alcohol use: Never    Drug use: Never       Patient Active Problem List   Diagnosis    Degenerative disc disease, cervical    Degenerative disc disease, lumbar    Neck pain    Bilateral arm pain    Low back pain radiating to right leg    Morbid obesity with BMI of 40.0-44.9, adult    Hyperlipidemia    Benign essential HTN       Immunization History   Administered Date(s) Administered    COVID-19, MRNA, LN-S, PF (MODERNA FULL 0.5 ML DOSE) 10/30/2021, 11/27/2021    DTaP 1966, 01/18/1967, 03/08/1967, 04/10/1968, 03/03/1971, 08/01/1977    IPV 1966, 01/18/1967, 03/08/1967, 04/10/1968, 04/07/1978    Influenza - Quadrivalent - PF *Preferred* (6 months and older) 10/14/2021    Measles 11/07/1967    Mumps 11/13/1968    Rubella 10/21/1970    Tdap 04/13/1988, 08/06/2004           Review of Systems   Constitutional: Positive for fatigue. Negative for activity change, appetite change, chills, diaphoresis and fever.   HENT: Negative for tinnitus and trouble swallowing.    Eyes: Negative for visual disturbance.   Respiratory: Negative for cough, chest tightness, shortness of breath and wheezing.    Cardiovascular: Negative for chest pain, palpitations and leg swelling.   Gastrointestinal: Negative for abdominal distention, abdominal pain, blood in stool, constipation, diarrhea, nausea and vomiting.   Endocrine: Negative for cold intolerance, heat intolerance, polydipsia, polyphagia and polyuria.   Genitourinary: Negative for decreased urine volume, dysuria, frequency, hematuria and urgency.   Musculoskeletal: Positive for arthralgias (chronic) and back pain (chronic). Negative for gait problem, neck pain and neck stiffness.   Skin: Negative for color change and rash.   Neurological: Negative for dizziness,  "syncope, weakness, light-headedness, numbness and headaches.   Psychiatric/Behavioral: Negative for behavioral problems, confusion and dysphoric mood. The patient is not nervous/anxious.      Objective:     Vitals:    05/11/22 0939   BP: 119/73   BP Location: Left arm   Patient Position: Sitting   BP Method: Medium (Automatic)   Pulse: 81   SpO2: 98%   Weight: 121.3 kg (267 lb 6.4 oz)   Height: 5' 5" (1.651 m)       Physical Exam  Vitals and nursing note reviewed.   Constitutional:       General: She is not in acute distress.     Appearance: Normal appearance. She is not diaphoretic.   HENT:      Head: Normocephalic and atraumatic.      Nose: Nose normal.      Mouth/Throat:      Mouth: Mucous membranes are moist.      Pharynx: Oropharynx is clear.   Eyes:      Extraocular Movements: Extraocular movements intact.      Conjunctiva/sclera: Conjunctivae normal.      Pupils: Pupils are equal, round, and reactive to light.   Neck:      Thyroid: No thyromegaly or thyroid tenderness.   Cardiovascular:      Rate and Rhythm: Normal rate and regular rhythm.      Pulses: Normal pulses.      Heart sounds: Normal heart sounds.   Pulmonary:      Effort: Pulmonary effort is normal.      Breath sounds: Normal breath sounds. No rales.   Abdominal:      General: Bowel sounds are normal. There is no distension.      Palpations: Abdomen is soft.      Tenderness: There is no abdominal tenderness.   Musculoskeletal:         General: Tenderness (lower back) present. Normal range of motion.      Cervical back: Normal range of motion and neck supple.      Right lower leg: No edema.      Left lower leg: No edema.   Lymphadenopathy:      Cervical: No cervical adenopathy.   Skin:     General: Skin is warm and dry.      Capillary Refill: Capillary refill takes less than 2 seconds.   Neurological:      Mental Status: She is alert and oriented to person, place, and time.   Psychiatric:         Mood and Affect: Mood and affect normal.         " Behavior: Behavior normal. Behavior is cooperative.         Thought Content: Thought content normal.         Judgment: Judgment normal.         Office Visit on 02/21/2022   Component Date Value Ref Range Status    WBC 03/18/2022 15.1 (A) 3.8 - 10.8 Thousand/uL Final    RBC 03/18/2022 4.78  3.80 - 5.10 Million/uL Final    Hemoglobin 03/18/2022 13.2  11.7 - 15.5 g/dL Final    Hematocrit 03/18/2022 39.5  35.0 - 45.0 % Final    MCV 03/18/2022 82.6  80.0 - 100.0 fL Final    MCH 03/18/2022 27.6  27.0 - 33.0 pg Final    MCHC 03/18/2022 33.4  32.0 - 36.0 g/dL Final    RDW 03/18/2022 15.8 (A) 11.0 - 15.0 % Final    Platelets 03/18/2022 356  140 - 400 Thousand/uL Final    MPV 03/18/2022 11.1  7.5 - 12.5 fL Final    Neutrophils, Abs 03/18/2022 10,026 (A) 1,500 - 7,800 cells/uL Final    Lymph # 03/18/2022 3,186  850 - 3,900 cells/uL Final    Mono # 03/18/2022 1,525 (A) 200 - 950 cells/uL Final    Eos # 03/18/2022 257  15 - 500 cells/uL Final    Baso # 03/18/2022 106  0 - 200 cells/uL Final    Neutrophils Relative 03/18/2022 66.4  % Final    Lymph % 03/18/2022 21.1  % Final    Mono % 03/18/2022 10.1  % Final    Eosinophil % 03/18/2022 1.7  % Final    Basophil % 03/18/2022 0.7  % Final    Glucose 03/18/2022 158 (A) 65 - 99 mg/dL Final    BUN 03/18/2022 18  7 - 25 mg/dL Final    Creatinine 03/18/2022 0.98  0.50 - 1.05 mg/dL Final    eGFR if non  03/18/2022 65  > OR = 60 mL/min/1.73m2 Final    eGFR if  03/18/2022 75  > OR = 60 mL/min/1.73m2 Final    BUN/Creatinine Ratio 03/18/2022 NOT APPLICABLE  6 - 22 (calc) Final    Sodium 03/18/2022 140  135 - 146 mmol/L Final    Potassium 03/18/2022 4.6  3.5 - 5.3 mmol/L Final    Chloride 03/18/2022 101  98 - 110 mmol/L Final    CO2 03/18/2022 30  20 - 32 mmol/L Final    Calcium 03/18/2022 9.9  8.6 - 10.4 mg/dL Final    Total Protein 03/18/2022 7.4  6.1 - 8.1 g/dL Final    Albumin 03/18/2022 4.3  3.6 - 5.1 g/dL Final     Globulin, Total 03/18/2022 3.1  1.9 - 3.7 g/dL (calc) Final    Albumin/Globulin Ratio 03/18/2022 1.4  1.0 - 2.5 (calc) Final    Total Bilirubin 03/18/2022 0.4  0.2 - 1.2 mg/dL Final    Alkaline Phosphatase 03/18/2022 115  37 - 153 U/L Final    AST 03/18/2022 16  10 - 35 U/L Final    ALT 03/18/2022 18  6 - 29 U/L Final    Hemoglobin A1C 03/18/2022 9.6 (A) <5.7 % of total Hgb Final    Cholesterol 03/18/2022 168  <200 mg/dL Final    HDL 03/18/2022 49 (A) > OR = 50 mg/dL Final    Triglycerides 03/18/2022 214 (A) <150 mg/dL Final    LDL Cholesterol 03/18/2022 88  mg/dL (calc) Final    HDL/Cholesterol Ratio 03/18/2022 3.4  <5.0 (calc) Final    Non HDL Chol. (LDL+VLDL) 03/18/2022 119  <130 mg/dL (calc) Final    Color, UA 03/18/2022 YELLOW  YELLOW Final    Appearance, UA 03/18/2022 CLEAR  CLEAR Final    Specific Gravity, UA 03/18/2022 1.022  1.001 - 1.035 Final    pH, UA 03/18/2022 7.0  5.0 - 8.0 Final    Glucose, UA 03/18/2022 3+ (A) NEGATIVE Final    Bilirubin, UA 03/18/2022 NEGATIVE  NEGATIVE Final    Ketones, UA 03/18/2022 NEGATIVE  NEGATIVE Final    Occult Blood UA 03/18/2022 NEGATIVE  NEGATIVE Final    Protein, UA 03/18/2022 NEGATIVE  NEGATIVE Final    Nitrite, UA 03/18/2022 POSITIVE (A) NEGATIVE Final    Leukocytes, UA 03/18/2022 TRACE (A) NEGATIVE Final    WBC Casts, UA 03/18/2022 0-5  < OR = 5 /HPF Final    RBC Casts, UA 03/18/2022 NONE SEEN  < OR = 2 /HPF Final    Squam Epithel, UA 03/18/2022 0-5  < OR = 5 /HPF Final    Bacteria, UA 03/18/2022 MANY (A) NONE SEEN /HPF Final    Hyaline Casts, UA 03/18/2022 NONE SEEN  NONE SEEN /LPF Final    Yeast, UA 03/18/2022 FEW (A) NONE SEEN /HPF Final    TSH w/reflex to FT4 03/18/2022 3.14  mIU/L Final    Urine Culture, Routine 03/18/2022  (A)  Final         Assessment:      1. Benign essential HTN    2. Other hyperlipidemia    3. Morbid obesity with BMI of 40.0-44.9, adult    4. Type 2 diabetes mellitus without complication, without long-term  current use of insulin    5. Other specified hypothyroidism    6. Current every day smoker          Plan:     Benign essential HTN    Other hyperlipidemia    Morbid obesity with BMI of 40.0-44.9, adult    Type 2 diabetes mellitus without complication, without long-term current use of insulin    Other specified hypothyroidism  -     levothyroxine (SYNTHROID) 88 MCG tablet; Take 1 tablet (88 mcg total) by mouth before breakfast.  Dispense: 30 tablet; Refill: 2    Current every day smoker  Comments:  Continue smoking cessation with Chantix         Current Outpatient Medications   Medication Sig Dispense Refill    albuterol (PROVENTIL/VENTOLIN HFA) 90 mcg/actuation inhaler INHALE 2 PUFFS BY MOUTH EVERY EVENING AS NEEDED FOR SHORTNESS OF BREATH OR FOR WHEEZING      amitriptyline (ELAVIL) 25 MG tablet Take 25 mg by mouth once daily.      ammonium lactate (LAC-HYDRIN) 12 % lotion APPLY TO AFFECTED AREA 2 TIMES A DAY thank stephenie -)      atorvastatin (LIPITOR) 80 MG tablet Take 80 mg by mouth once daily.      azelastine (ASTELIN) 137 mcg (0.1 %) nasal spray SPRAY 1 SPRAY INTRANASALLY 2 TIMES A DAY      cholecalciferol, vitamin D3, 1,250 mcg (50,000 unit) capsule TAKE 1 CAPSULE BY MOUTH WEEKLY AS DIRECTED thank stephenie -)      COMBIVENT RESPIMAT  mcg/actuation inhaler INHALE 1 puff BY MOUTH 4 TIMES A DAY      cyclobenzaprine (FLEXERIL) 10 MG tablet TAKE 1 TABLET BY MOUTH 3 TIMES A DAY AS NEEDED FOR MUSCLE SPASMS MAY MAKE DROWSY      ergocalciferol (ERGOCALCIFEROL) 50,000 unit Cap Take 50,000 Units by mouth.      estradioL (ESTRACE) 1 MG tablet Take 1 mg by mouth once daily.      fluocinonide (LIDEX) 0.05 % external solution APPLY to to AFFECTED AREA DAILY      fluticasone (VERAMYST) 27.5 mcg/actuation nasal spray 2 sprays by Nasal route.      folic acid (FOLVITE) 1 MG tablet Take 1,000 mcg by mouth once daily.      furosemide (LASIX) 40 MG tablet Take 40 mg by mouth once daily.      ketoconazole  "(NIZORAL) 2 % shampoo APPLY TOPICALLY to wash AFFECTED AREA DAILY      loratadine (CLARITIN) 10 mg tablet Take 10 mg by mouth.      losartan (COZAAR) 100 MG tablet Take 100 mg by mouth once daily.      montelukast (SINGULAIR) 10 mg tablet Take 10 mg by mouth every evening.      nicotine polacrilex 4 MG Lozg Take 1 lozenge (4 mg total) by mouth as needed (Do not use more than 10 pieces in 24 hours). 216 lozenge 0    nitrofurantoin, macrocrystal-monohydrate, (MACROBID) 100 MG capsule Take 1 capsule (100 mg total) by mouth 2 (two) times daily. 10 capsule 0    omeprazole (PRILOSEC) 20 MG capsule Take 20 mg by mouth once daily.      potassium chloride SA (K-DUR,KLOR-CON) 20 MEQ tablet Take 20 mEq by mouth once daily.      pravastatin (PRAVACHOL) 80 MG tablet Take 80 mg by mouth once daily.      pregabalin (LYRICA) 300 MG Cap Take 300 mg by mouth 2 (two) times daily.      TOUJEO MAX U-300 SOLOSTAR 300 unit/mL (3 mL) insulin pen INJECT 100 UNITS SUBCUTANEOUS once DAILY      traMADoL (ULTRAM) 50 mg tablet Take 50 mg by mouth 4 (four) times daily.      triamcinolone acetonide 0.1% (KENALOG) 0.1 % cream APPLY TOPICALLY 2 TIMES A DAY. Apply to Frontal Scalp, Left Superior Cutaneous Lip (Apical Triangle), Right Superior Cutaneous Lip (Apical Triangle)]      TRULICITY 4.5 mg/0.5 mL pen injector Inject 4.5 mg subcutaneously once a week      varenicline (CHANTIX) 0.5 MG Tab Take 1 tablet by mouth once daily for 3 days, then take 1 tablet twice daily for 4 days 11 tablet 0    varenicline (CHANTIX) 1 mg Tab Take 1 tablet (1 mg total) by mouth 2 (two) times daily. 56 tablet 0    XIGDUO XR 5-1,000 mg TBph TAKE 2 TABLETS BY MOUTH EACH MORNING      levothyroxine (SYNTHROID) 88 MCG tablet Take 1 tablet (88 mcg total) by mouth before breakfast. 30 tablet 2    TRUEPLUS PEN NEEDLE 32 gauge x 5/32" Ndle USE DAILY OR AS DIRECTED       No current facility-administered medications for this visit.       Medications Discontinued " "During This Encounter   Medication Reason    levothyroxine (SYNTHROID) 75 MCG tablet Change in Dosage Form       Health Maintenance   Topic Date Due    Foot Exam  Never done    Eye Exam  Never done    Mammogram  Never done    TETANUS VACCINE  08/06/2014    Hepatitis C Screening  02/21/2023 (Originally 1965)    Hemoglobin A1c  06/18/2022    Lipid Panel  03/18/2023    Low Dose Statin  05/11/2023       Patient Instructions   RTC in 3 months for F/U or sooner if needed.    Keep appts with specialists as scheduled.    Instructed patient to report to nearest ER or call 911 if begins to have difficulty breathing, turning blue, chest pain, B/P < 80/60 or >170/100, palpitations, syncope, extreme weakness, or severe H/A. Patient verbalized understanding.      Patient Education       Diabetes and Diet   The Basics   Written by the doctors and editors at [facility]   Why is diet important in diabetes? -- Diet is important because it is part of diabetes treatment. Many people need to change what they eat and how much they eat to help treat their diabetes. It is important for people to treat their diabetes so that they:  · Keep their blood sugar at or near a normal level  · Prevent long-term problems, such as heart or kidney problems, that can happen in people with diabetes  Changing your diet can also help treat obesity, high blood pressure, and high cholesterol. These conditions can affect people with diabetes and can lead to future problems, such as heart attacks or strokes.  Who will work with me to change my diet? -- Your doctor or nurse will work with you to make a food plan to change your diet. They might also recommend that you work with a "dietitian." A dietitian is an expert on food and eating.  Do I need to eat at the same times every day? -- When and how often you should eat depends, in part, on the diabetes medicines you take. For example:  · People who take about the same amount of insulin at the same " "time each day (called a "fixed regimen") should eat meals at the same times. This is also true for people who take pills that increase insulin levels, such as sulfonylureas. Eating meals at the same time every day helps prevent low blood sugar.  · People who adjust the dose and timing of their insulin each day (called a "flexible regimen") do not always have to eat meals at the same time. That's because they can time their insulin dose for before they plan to eat, and also adjust the dose for how much they plan to eat.  · People who take medicines that don't usually cause low blood sugar, such as metformin, don't have to eat meals at the same time every day.  What do I need to think about when planning what to eat? -- Our bodies break down the food we eat into small pieces called carbohydrates, proteins, and fats.  When planning what to eat, people with diabetes need to think about:  1. Carbohydrates (or "carbs") - Carbohydrates, which are sugars that our bodies use for energy, can raise a person's blood sugar level. Your doctor, nurse, or dietitian will tell you how many carbohydrates you should eat at each meal or snack. Foods that have carbohydrates include:  ? Bread, pasta, and rice  ? Vegetables and fruits  ? Dairy foods  ? Foods and drinks with added sugar  It is best to get your carbohydrates from fruits, vegetables, whole grains, and low-fat milk. It is best to avoid drinks with added sugar, like soda, juices, and sports drinks.   1. Protein - Your doctor, nurse, or dietitian will tell you how much protein you should eat each day. It is best to eat lean meats, fish, eggs, beans, peas, soy products, nuts, and seeds.  2. Fats - The type of fat you eat is more important than the amount of fat. "Saturated" and "trans" fats can increase your risk for heart problems, like a heart attack.  ? Foods that have saturated fats include meat, butter, cheese, and ice cream.  ? Foods that have trans fats include processed " "food with "partially hydrogenated oils" on the ingredient list. This may include fried foods, store bought cookies, muffins, pies, and cakes.  "Monounsaturated" and "polyunsaturated" fats are better for you. Foods with these types of fat include fish, avocado, olive oil, and nuts.  · Calories - People need to eat a certain amount of calories each day to keep their weight the same. People who are overweight and want to lose weight need to eat fewer calories each day.  · Fiber - Eating foods with a lot of fiber can help control a person's blood sugar level. Foods that have a lot of fiber include apples, green beans, peas, beans, lentils, nuts, oatmeal, and whole grains.  · Salt - People who have high blood pressure should not eat foods that contain a lot of salt (also called sodium). People with high blood pressure should also eat healthy foods, such as fruits, vegetables, and low-fat dairy foods.  · Alcohol - Having more than 1 drink (for women) or 2 drinks (for men) a day can raise blood sugar levels. Also, drinks that have fruit juice or soda in them can raise blood sugar levels.  What can I do if I need to lose weight? -- If you need to lose weight, you can:  · Exercise - Try to get at least 30 minutes of physical activity a day, most days of the week. Even gentle exercise, like walking, is good for your health. Some people with diabetes need to change their medicine dose before they exercise. They might also need to check their blood sugar levels before and after exercising.  · Eat fewer calories - Your doctor, nurse, or dietitian can tell you how many calories you should eat each day in order to lose weight.  If you are worried about your weight, size, or shape, talk with your doctor, nurse, or dietitian. They can help you make changes to improve your health.  Can I eat the same foods as my family? -- Yes. You do not need to eat special foods if you have diabetes. You and your family can eat the same foods. " Changing your diet is mostly about eating healthy foods and not eating too much.  What are the other parts of diabetes treatment? -- Besides changing your diet, the other parts of diabetes treatment are:  · Exercise  · Medicines  Some people with diabetes need to learn how to match their diet and exercise with their medicine dose. For example, people who use insulin might need to choose the dose of insulin they give themselves. To choose their dose, they need to think about:  · What they plan to eat at the next meal  · How much exercise they plan to do  · What their blood sugar level is  If the diet and exercise do not match the medicine dose, a person's blood sugar level can get too low or too high. Blood sugar levels that are too low or too high can cause problems.  All topics are updated as new evidence becomes available and our peer review process is complete.  This topic retrieved from [employer] on: Sep 21, 2021.  Topic 01781 Version 7.0  Release: 29.4.2 - C29.263  © 2021 UpToDate, Inc. and/or its affiliates. All rights reserved.  Consumer Information Use and Disclaimer   This information is not specific medical advice and does not replace information you receive from your health care provider. This is only a brief summary of general information. It does NOT include all information about conditions, illnesses, injuries, tests, procedures, treatments, therapies, discharge instructions or life-style choices that may apply to you. You must talk with your health care provider for complete information about your health and treatment options. This information should not be used to decide whether or not to accept your health care provider's advice, instructions or recommendations. Only your health care provider has the knowledge and training to provide advice that is right for you. The use of this information is governed by the [employer] End User License Agreement, available at  "https://www.[employer].com/en/solutions/lexicomp/about/bhargav.The use of [employer] content is governed by the [employer] Terms of Use. ©2021 UpToDate, Inc. All rights reserved.  Copyright   © 2021 UpToDate, Inc. and/or its affiliates. All rights reserved.  Patient Education       Controlling Your Blood Pressure Through Lifestyle   The Basics   Written by the doctors and editors at [employer]   What does my lifestyle have to do with my blood pressure? -- The things you do and the foods you eat have a big effect on your blood pressure and your overall health. Following the right lifestyle can:  · Lower your blood pressure or keep you from getting high blood pressure in the first place  · Reduce your need for blood pressure medicines  · Make medicines for high blood pressure work better, if you do take them  · Lower the chances that you'll have a heart attack or stroke, or develop kidney disease  Which lifestyle choices will help lower my blood pressure? -- Here's what you can do:  · Lose weight (if you are overweight)  · Choose a diet rich in fruits, vegetables, and low-fat dairy products, and low in meats, sweets, and refined grains  · Eat less salt (sodium)  · Do something active for at least 30 minutes a day on most days of the week  · Limit the amount of alcohol you drink  If you have high blood pressure, it's also very important to quit smoking (if you smoke). Quitting smoking might not bring your blood pressure down. But it will lower the chances that you'll have a heart attack or stroke, and it will help you feel better and live longer.  Start low and go slow -- The changes listed above might sound like a lot, but don't worry. You don't have to change everything all at once. The key to improving your lifestyle is to "start low and go slow." Choose 1 small, specific thing to change and try doing it for a while. If it works for you, keep doing it until it becomes a habit. If it doesn't, don't give up. Choose " "something else to change and see how that goes.  Let's say, for example, that you would like to improve your diet. If you're the type of person who eats cheeseburgers and French fries all the time, you can't switch to eating just salads from one day to the next. When people try to make changes like that, they often fail. Then they feel frustrated and tend to give up. So instead of trying to change everything about your diet in 1 day, change 1 or 2 small things about your diet and give yourself time to get used to those changes. For instance, keep the cheeseburger but give up the French fries. Or eat the same things but cut your portions in half.  As you find things that you are able to change and stick with, keep adding new changes. In time, you will see that you can actually change a lot. You just have to get used to the changes slowly.  Lose weight -- When people think about losing weight, they sometimes make it more complicated than it really is. To lose weight, you have to either eat less or move more. If you do both of those things, it's even better. But there is no single weight-loss diet or activity that's better than any other. When it comes to weight loss, the most effective plan is the one that you'll stick with.  Improve your diet -- There is no single diet that is right for everyone. But in general, a healthy diet can include:  · Lots of fruits, vegetables, and whole grains  · Some beans, peas, lentils, chickpeas, and similar foods  · Some nuts, such as walnuts, almonds, and peanuts  · Fat-free or low-fat milk and milk products  · Some fish  To have a healthy diet, it's also important to limit or avoid sugar, sweets, meats, and refined grains. (Refined grains are found in white bread, white rice, most forms of pasta, and most packaged "snack" foods.)  Reduce salt -- Many people think that eating a low-sodium diet means avoiding the salt shaker and not adding salt when cooking. The truth is, not adding " "salt at the table or when you cook will only help a little. Almost all of the sodium you eat is already in the food you buy at the grocery store or at restaurants (figure 1).  The most important thing you can do to cut down on sodium is to eat less processed food. That means that you should avoid most foods that are sold in cans, boxes, jars, and bags. You should also eat in restaurants less often.  To reduce the amount of sodium you get, buy fresh or fresh-frozen fruits, vegetables, and meats. (Fresh-frozen foods have had nothing added to them before freezing.) Then you can make meals at home, from scratch, with these ingredients.  As with the other changes, don't try to cut out salt all at once. Instead, choose 1 or 2 foods that have a lot of sodium and try to replace them with low-sodium choices. When you get used to those low-sodium options, find another food or 2 to change. Then keep going, until all the foods you eat are sodium-free or low in sodium.  Become more active -- If you want to be more active, you don't have to go to the gym or get all sweaty. It is possible to increase your activity level while doing everyday things you enjoy. Walking, gardening, and dancing are just a few of the things that you might try. As with all the other changes, the key is not to do too much too fast. If you don't do any activity now, start by walking for just a few minutes every other day. Do that for a few weeks. If you stick with it, try doing it for longer. But if you find that you don't like walking, try a different activity.  Drink less alcohol -- If you are a woman, do not have more than 1 "standard drink" of alcohol a day. If you are a man, do not have more than 2. A "standard drink" is:  · A can or bottle that has 12 ounces of beer  · A glass that has 5 ounces of wine  · A shot that has 1.5 ounces of whiskey  Where should I start? -- If you want to improve your lifestyle, start by making the changes that you think " "would be easiest for you. If you used to exercise and just got out of the habit, maybe it would be easy for you to start exercising again. Or if you actually like cooking meals from scratch, maybe the first thing you should focus on is eating home-cooked meals that are low in sodium.  Whatever you tackle first, choose specific, realistic goals, and give yourself a deadline. For example, do not decide that you are going to "exercise more." Instead, decide that you are going to walk for 10 minutes on Monday, Wednesday, and Friday, and that you are going to do this for the next 2 weeks.  When lifestyle changes are too general, people have a hard time following through.  Now go. You can do it!  All topics are updated as new evidence becomes available and our peer review process is complete.  This topic retrieved from [employer] on: Sep 21, 2021.  Topic 99136 Version 8.0  Release: 29.4.2 - C29.263  © 2021 UpToDate, Inc. and/or its affiliates. All rights reserved.  figure 1: Sources of sodium in your diet     Graphic 58654 Version 2.0    Consumer Information Use and Disclaimer   This information is not specific medical advice and does not replace information you receive from your health care provider. This is only a brief summary of general information. It does NOT include all information about conditions, illnesses, injuries, tests, procedures, treatments, therapies, discharge instructions or life-style choices that may apply to you. You must talk with your health care provider for complete information about your health and treatment options. This information should not be used to decide whether or not to accept your health care provider's advice, instructions or recommendations. Only your health care provider has the knowledge and training to provide advice that is right for you. The use of this information is governed by the [employer] End User License Agreement, available at " "https://www.woltersVayaFelizer.com/en/solutions/lexicomp/about/bhargav.The use of [employer]Like content is governed by the [employer] Terms of Use. ©2021 UpToDate, Inc. All rights reserved.  Copyright   © 2021 UpToDate, Inc. and/or its affiliates. All rights reserved.  Patient Education       Low Cholesterol, Saturated Fat, and Trans Fat Diet   About this topic   Cholesterol, saturated fat, and trans fat are in many foods. These may raise your blood cholesterol levels. If your cholesterol is too high, this can cause health problems in your heart, liver, kidneys, and even your eyes. The key to lowering your risk of heart problems is to lower your bad fat intake.  Saturated fats and trans fats are the bad fats. These fats clog your arteries and raise your bad cholesterol. Saturated fats and trans fats are solid fats at room temperature. Saturated fats are animal fats. Trans fats are manmade fats. They add flavor to a lot of packaged foods. Staying away from saturated and trans fats will help your heart.  When you do eat foods with fat, make sure they have the good fats. Monounsaturated and polyunsaturated fats are good fats. These fats help raise your good cholesterol and protect your heart.  General   How to Lower Fat and Cholesterol in Your Diet   · Read the labels of the foods you buy from the market to find out how much fat is present. Under 5% of total fat on a label means it is "low fat". Over 20% of total fat on a label means it is high fat.  · Eat high fiber foods, like soluble fiber. This type of fiber helps lower cholesterol in the body. Choose oatmeal, fruits (like apples), beans, and nuts to get the most soluble fiber.  · Eat foods high in omega-3 fatty acids like gopal seeds, walnuts, salmon, tuna, trout, herring, flaxseed, and soybeans. These foods help keep the heart healthy.  · Limit your bad fat and oil intake.  · Stay away from butter, stick margarine, shortening, lard, and palm and coconut oil. Pick plant-based " spreads instead.  · Limit mayonnaise, salad dressings, gravies, and sauces, unless it is made from low-fat ingredients.  · Limit chocolate.  · Do not eat high-fat processed foods like hot dogs, feliciano, sausage, ham and other luncheon meats high in fat, and some frozen foods. Pick fish, chicken, turkey, and lean meats instead.  · Eat more dried beans, lentils, and tofu to get your protein.  · Do not eat organ meats, like liver.  · Choose nonfat or low-fat milk, yogurt, and cheese.  · Use light or fat-free cream cheese and sour cream.  · Eat lots of fruits and vegetables.  · Pick whole grain breads, cereals, pastas, and rice.  · Do not eat snacks that are high in fats like granola, cookies, pies, pastries, doughnuts, and croissants.  · Stay away from deep fried foods.  Help When Cooking   · Remove the fat portion of meats and the skin from poultry before cooking.  · Bake, broil, grill, poach, or roast poultry, fish, and lean meats.  · Drain and throw away the fat that drains out of meat as you cook it.  · Try to add little or no fat to foods.  · Use olive or canola oil for cooking or baking.  · Steam your vegetables.  · Use herbs or no-oil marinades to flavor foods.         Who should use this diet?   This diet is for people who are at high risk of getting health problems like heart disease, high blood pressure, diabetes, and others. This diet is also good for all people to follow to keep your heart healthy.  What foods are good to eat?   Foods with good fats are:  · Canola, peanut, and olive oil  · Safflower, soybean, and corn oil  · Walnuts, almonds, cashews, and peanuts  · Pumpkin and sunflower seeds  · [place] and tuna  · Tofu  · Soymilk  · Avocado  What foods should be limited or avoided?   Stay away from these types of foods that have saturated fats:  · Whole fat dairy products like cheese, ice cream, whole milk, and cream  · Palm and coconut oils  · High-fat meats like beef, lamb, poultry with the skin, feliciano,  and sausage  · Butter and lard  Stay away from these types of foods that may have trans fat:  · Cookies, cakes, candy, doughnuts, baked goods, muffins, pizza dough, and pie crusts that are packaged  · Fried foods  · Frozen dinners  · Chips and crackers  · Microwave popcorn  · Stick margarine and vegetable shortenings  Helpful tips   To help stay away from saturated fat:  · Pick lean cuts of meat  · Take the skin off chicken and turkey or pick skinless  · Pick low-fat cheese, milk, and ice cream  · Use liquid oils when cooking and baking, such as olive oil and canola oil  To help stay away from trans fat:  · Look at your labels. Choose foods with 0% trans fat. Read the ingredient list. Avoid foods with partially hydrogenated oil in the ingredient list. This means there is trans fat in the product.  Where can I learn more?   American Heart Association   http://www.heart.org/HEARTORG/Conditions/Cholesterol/PreventionTreatmentofHighCholesterol/Know-Your-Fats_[facility]_305628_Article.jsp   Last Reviewed Date   2021-10-05  Consumer Information Use and Disclaimer   This information is not specific medical advice and does not replace information you receive from your health care provider. This is only a brief summary of general information. It does NOT include all information about conditions, illnesses, injuries, tests, procedures, treatments, therapies, discharge instructions or life-style choices that may apply to you. You must talk with your health care provider for complete information about your health and treatment options. This information should not be used to decide whether or not to accept your health care providers advice, instructions or recommendations. Only your health care provider has the knowledge and training to provide advice that is right for you.   Copyright   Copyright © 2021 UpToDate, Inc. and its affiliates and/or licensors. All rights reserved.        Risks, benefits, and alternatives discussed with  patient, Patient verbalized understanding of discussed plan of care. Asked patient if any further questions, answered no.    Future Appointments   Date Time Provider Department Center   5/13/2022  8:00 AM Ellie Braswell RRT [facility]   8/11/2022  9:40 AM Yaima Pulliam NP [facility] Pulliam NP

## 2022-05-13 ENCOUNTER — CLINICAL SUPPORT (OUTPATIENT)
Dept: SMOKING CESSATION | Facility: CLINIC | Age: 57
End: 2022-05-13
Payer: COMMERCIAL

## 2022-05-13 DIAGNOSIS — F17.200 NICOTINE DEPENDENCE: Primary | ICD-10-CM

## 2022-05-13 PROCEDURE — 99404 PR PREVENT COUNSEL,INDIV,60 MIN: ICD-10-PCS | Mod: S$GLB,,, | Performed by: GENERAL PRACTICE

## 2022-05-13 PROCEDURE — 99404 PREV MED CNSL INDIV APPRX 60: CPT | Mod: S$GLB,,, | Performed by: GENERAL PRACTICE

## 2022-05-16 ENCOUNTER — CLINICAL SUPPORT (OUTPATIENT)
Dept: SMOKING CESSATION | Facility: CLINIC | Age: 57
End: 2022-05-16
Payer: COMMERCIAL

## 2022-05-16 DIAGNOSIS — F17.200 NICOTINE DEPENDENCE: Primary | ICD-10-CM

## 2022-05-16 PROCEDURE — 99404 PR PREVENT COUNSEL,INDIV,60 MIN: ICD-10-PCS | Mod: S$GLB,,, | Performed by: GENERAL PRACTICE

## 2022-05-16 PROCEDURE — 99404 PREV MED CNSL INDIV APPRX 60: CPT | Mod: S$GLB,,, | Performed by: GENERAL PRACTICE

## 2022-05-16 NOTE — PROGRESS NOTES
Individual Follow-Up Form    5/16/2022    Clinical Status of Patient: Outpatient    Length of Service: 60 minutes    Continuing Medication: yes  Chantix    Other Medications: Nicotine Lozenges     Target Symptoms: Withdrawal and medication side effects. The following were  rated moderate (3) to severe (4) on TCRS:  · Moderate (3): none reported  · Severe (4): none reported    Comments: Spoke with patient at length in clinic regarding tobacco cessation progress. Patient remains on prescribed tobacco cessation medication 4mg nicotine lozenges and Chantix without any negative side effects at this time.  Patient remains smoking 10 cigarettes per day.  Patient will cut down to 9 cigarettes a day this week.  Patient stated that is has been able to eliminate the cigarette she smokes first thing I the morning and the cigarettes she would smoke right before bedtime.  Counselor reviewed strategies, habitual behavior, high risks situations, understanding urges and cravings, stress and relaxation with open discussion and additional interventions, Introduced lapses, relapses, understanding them and analyzing the situation of a lapse, conflict issues that may be linked to a lapse.  Counselor discussed the importance of disconnecting daily behaviors from cigarettes to help disassociate the two and hopefully eliminate the cigarettes that are currently being smoked.  Patient appeared to be receptive to the information given.  Counselor will continue to motivate patient to be tobacco free.    Diagnosis: F17.200    Next Visit: 1 week

## 2022-05-19 ENCOUNTER — PATIENT OUTREACH (OUTPATIENT)
Dept: ADMINISTRATIVE | Facility: HOSPITAL | Age: 57
End: 2022-05-19
Payer: MEDICARE

## 2022-05-19 NOTE — PROGRESS NOTES
According to Provider documentation the pt had a Mammogram at [facility] last year. Fax request sent to obtain records.   No eye exam performed. No Colorectal screening performed.

## 2022-05-19 NOTE — LETTER
May 19, 2022                 [facility]  [address]  [facility] [address]  Phone: [phone] May 19, 2022     Patient: Anca Grace    YOB: 1965   Date of Visit: 5/19/2022   To whom it may concern     We are seeing Anca AshleyGEO bonilla.B is 1965, at [facility]. Garrett Pulliam NP is their primary care physician. To help with our [place] maintenance records could you please send the following:     Most recent Mammogram Result.    Please send fax to [phone], Attention Natalie RUFF LPN Care Coordination.    Thank-you in advance for your assistance. If you have any questions or concerns, please don't hesitate to contact me at [phone].     Ntaalie RUFF [facility]  Care Coordination Department  [facility]  Phone Number: [phone]

## 2022-05-20 ENCOUNTER — CLINICAL SUPPORT (OUTPATIENT)
Dept: SMOKING CESSATION | Facility: CLINIC | Age: 57
End: 2022-05-20
Payer: COMMERCIAL

## 2022-05-20 DIAGNOSIS — F17.200 NICOTINE DEPENDENCE: Primary | ICD-10-CM

## 2022-05-20 PROCEDURE — 99406 PR TOBACCO USE CESSATION INTERMEDIATE 3-10 MINUTES: ICD-10-PCS | Mod: S$GLB,,, | Performed by: GENERAL PRACTICE

## 2022-05-20 PROCEDURE — 99406 BEHAV CHNG SMOKING 3-10 MIN: CPT | Mod: S$GLB,,, | Performed by: GENERAL PRACTICE

## 2022-05-20 RX ORDER — VARENICLINE TARTRATE 1 MG/1
1 TABLET, FILM COATED ORAL 2 TIMES DAILY
Qty: 56 TABLET | Refills: 0 | Status: SHIPPED | OUTPATIENT
Start: 2022-05-20 | End: 2022-06-21 | Stop reason: DRUGHIGH

## 2022-05-24 ENCOUNTER — CLINICAL SUPPORT (OUTPATIENT)
Dept: SMOKING CESSATION | Facility: CLINIC | Age: 57
End: 2022-05-24
Payer: COMMERCIAL

## 2022-05-24 DIAGNOSIS — F17.200 NICOTINE DEPENDENCE: Primary | ICD-10-CM

## 2022-05-24 PROCEDURE — 99402 PR PREVENT COUNSEL,INDIV,30 MIN: ICD-10-PCS | Mod: S$GLB,,, | Performed by: GENERAL PRACTICE

## 2022-05-24 PROCEDURE — 99402 PREV MED CNSL INDIV APPRX 30: CPT | Mod: S$GLB,,, | Performed by: GENERAL PRACTICE

## 2022-05-24 NOTE — PROGRESS NOTES
Individual Follow-Up Form    5/24/2022    Clinical Status of Patient: Outpatient    Length of Service: 30 minutes    Continuing Medication: yes  Chantix     Target Symptoms: Withdrawal and medication side effects. The following were  rated moderate (3) to severe (4) on TCRS:  · Moderate (3): none reported  · Severe (4): none reported    Comments: Spoke with patient at length in clinic regarding tobacco cessation progress. Patient remains on prescribed tobacco cessation medication 1mg Chantix without any negative side effects at this time.  Patient decreased to 9 cigarettes daily.  Patient will cut down to 8 cigarettes a day this week.  Counselor reviewed strategies, habitual behavior, high risks situations, understanding urges and cravings, stress and relaxation with open discussion and additional interventions, Introduced lapses, relapses, understanding them and analyzing the situation of a lapse, conflict issues that may be linked to a lapse.  Patient appeared to be receptive to the information given.  Counselor will continue to motivate patient to be tobacco free.    Diagnosis: F17.200    Next Visit: 1 week

## 2022-05-31 ENCOUNTER — CLINICAL SUPPORT (OUTPATIENT)
Dept: SMOKING CESSATION | Facility: CLINIC | Age: 57
End: 2022-05-31
Payer: COMMERCIAL

## 2022-05-31 DIAGNOSIS — F17.200 NICOTINE DEPENDENCE: Primary | ICD-10-CM

## 2022-05-31 PROCEDURE — 99403 PREV MED CNSL INDIV APPRX 45: CPT | Mod: S$GLB,,, | Performed by: GENERAL PRACTICE

## 2022-05-31 PROCEDURE — 99403 PR PREVENT COUNSEL,INDIV,45 MIN: ICD-10-PCS | Mod: S$GLB,,, | Performed by: GENERAL PRACTICE

## 2022-05-31 RX ORDER — ASPIRIN/CALCIUM CARB/MAGNESIUM 325 MG
4 TABLET ORAL
Qty: 216 LOZENGE | Refills: 0 | Status: SHIPPED | OUTPATIENT
Start: 2022-05-31 | End: 2022-09-20 | Stop reason: SDUPTHER

## 2022-05-31 NOTE — PROGRESS NOTES
Individual Follow-Up Form    5/31/2022    Clinical Status of Patient: Outpatient    Length of Service: 45 minutes    Continuing Medication: yes  Chantix     Additional Medication:  Nicotine Lozenges     Target Symptoms: Withdrawal and medication side effects. The following were  rated moderate (3) to severe (4) on TCRS:  · Moderate (3): none reported  · Severe (4): none reported    Comments: Spoke with patient at length in clinic regarding tobacco cessation progress. Patient remains on prescribed tobacco cessation medication 1mg Chantix and 4mg nicotine lozenges without any negative side effects at this time.  Patient remains smoking 9 cigarettes per day.  Patient will cut down to 8 cigarettes a day this week.  Patient stated that she was able to eliminate one cigarette in the morning and one cigarette in the evening before bed.  Counselor reviewed strategies, habitual behavior, stress, and high risk situations. Introduced stress with addition interventions, SOLVE, relaxation with interventions, nutrition, exercise, weight gain, and the importance of rewarding oneself for accomplishments toward becoming tobacco free. Open discussion of all items with interventions.   Patient and counselor discussed different methods to eliminate existing cigarettes.   Patient stated that she will replace her 2pm cigarette with a nicotine lozenge.  Counselor submitted a refill prescription for 4mg nicotine lozenges.  Patient appeared to be receptive to the information given.  Counselor will continue to motivate patient to be tobacco free.    Diagnosis: F17.200    Next Visit: 1 week

## 2022-06-14 ENCOUNTER — CLINICAL SUPPORT (OUTPATIENT)
Dept: SMOKING CESSATION | Facility: CLINIC | Age: 57
End: 2022-06-14
Payer: COMMERCIAL

## 2022-06-14 DIAGNOSIS — F17.200 NICOTINE DEPENDENCE: Primary | ICD-10-CM

## 2022-06-14 PROCEDURE — 90853 GROUP PSYCHOTHERAPY: CPT | Mod: S$GLB,,, | Performed by: GENERAL PRACTICE

## 2022-06-14 PROCEDURE — 90853 PR GROUP PSYCHOTHERAPY: ICD-10-PCS | Mod: S$GLB,,, | Performed by: GENERAL PRACTICE

## 2022-06-14 NOTE — PROGRESS NOTES
Individual Follow-Up Form    6/14/2022    Clinical Status of Patient: Outpatient    Length of Service: 60 minutes    Continuing Medication: yes  Chantix     Target Symptoms: Withdrawal and medication side effects. The following were  rated moderate (3) to severe (4) on TCRS:  · Moderate (3): none reported  · Severe (4): none reported    Comments: Spoke with patient at length in clinic regarding tobacco cessation progress. Patient remains on prescribed tobacco cessation medication 1mg Chantix without any negative side effects at this time.  Patient decreased to 7 cigarettes daily.  Patient will cut down to 6 cigarettes a day this week.  Counselor reviewed strategies, habitual behavior, high risks situations, understanding urges and cravings, stress and relaxation with open discussion and additional interventions, Introduced lapses, relapses, understanding them and analyzing the situation of a lapse, conflict issues that may be linked to a lapse.  Counselor discussed the hand to mouth habit that associated with smoking.  Counselor suggested that patient use jolly ranchers, dum dum lollipops, and toffee candy to help control nicotine cravings.  Counselor recommended that patient stay hydrated and eat fruits and vegetables to help decrease nicotine cravings.  Counselor gave patient more cinnamon toothpicks to help deter her from smoking.   Patient appeared to be receptive to the information given.  Counselor will continue to motivate patient to be tobacco free.     Diagnosis: F17.200    Next Visit: 1 week

## 2022-06-15 DIAGNOSIS — K21.00 GASTROESOPHAGEAL REFLUX DISEASE WITH ESOPHAGITIS WITHOUT HEMORRHAGE: Primary | ICD-10-CM

## 2022-06-15 RX ORDER — OMEPRAZOLE 20 MG/1
20 CAPSULE, DELAYED RELEASE ORAL DAILY
Qty: 30 CAPSULE | Refills: 2 | Status: SHIPPED | OUTPATIENT
Start: 2022-06-15 | End: 2022-09-12 | Stop reason: SDUPTHER

## 2022-06-21 ENCOUNTER — CLINICAL SUPPORT (OUTPATIENT)
Dept: SMOKING CESSATION | Facility: CLINIC | Age: 57
End: 2022-06-21
Payer: COMMERCIAL

## 2022-06-21 DIAGNOSIS — F17.200 NICOTINE DEPENDENCE: Primary | ICD-10-CM

## 2022-06-21 PROCEDURE — 99403 PR PREVENT COUNSEL,INDIV,45 MIN: ICD-10-PCS | Mod: S$GLB,,, | Performed by: GENERAL PRACTICE

## 2022-06-21 PROCEDURE — 99403 PREV MED CNSL INDIV APPRX 45: CPT | Mod: S$GLB,,, | Performed by: GENERAL PRACTICE

## 2022-06-21 RX ORDER — VARENICLINE TARTRATE 1 MG/1
1 TABLET, FILM COATED ORAL 2 TIMES DAILY
Qty: 56 TABLET | Refills: 0 | Status: SHIPPED | OUTPATIENT
Start: 2022-06-21 | End: 2022-07-12 | Stop reason: SDUPTHER

## 2022-06-21 NOTE — PROGRESS NOTES
Individual Follow-Up Form    6/21/2022    Clinical Status of Patient: Outpatient    Length of Service: 45 minutes    Continuing Medication: yes  Chantix    Other Medications: Nicotine Lozenges     Target Symptoms: Withdrawal and medication side effects. The following were  rated moderate (3) to severe (4) on TCRS:  · Moderate (3): none reported  · Severe (4): none reported    Comments: Spoke with patient at length in clinic regarding tobacco cessation progress. Patient remains on prescribed tobacco cessation medication 1mg Chantix and 4mg nicotine lozenges without any negative side effects at this time.  Patient remains smoking 6-7 cigarettes per day.  Patient will cut down to 5 cigarettes a day this week.  Counselor reviewed strategies, controlling environment, cues, triggers, new goals set. Understanding urges, cravings, stress and relaxation. Open discussion with intervention discussion.  Patient stated that her stressed levels were a little increased and that may have had some affect on her recent progress. Patient is preparing to travel out of state for a few weeks.  Counselor discussed a preventive plan with patient to help her deter from smoking.  Counselor provided patient with cinnamon toothpicks and submitted a refill for 1mg Chantix.  Patient appeared to be receptive to the information given.  Counselor will continue to motivate patient to be tobacco free.    Diagnosis: F17.200    Next Visit: 1 week

## 2022-06-23 ENCOUNTER — TELEPHONE (OUTPATIENT)
Dept: PRIMARY CARE CLINIC | Facility: CLINIC | Age: 57
End: 2022-06-23
Payer: MEDICARE

## 2022-06-23 DIAGNOSIS — J43.8 OTHER EMPHYSEMA: ICD-10-CM

## 2022-06-23 DIAGNOSIS — E87.6 LOW BLOOD POTASSIUM: Primary | ICD-10-CM

## 2022-06-23 RX ORDER — IPRATROPIUM BROMIDE AND ALBUTEROL 20; 100 UG/1; UG/1
SPRAY, METERED RESPIRATORY (INHALATION)
Qty: 4 G | Refills: 2 | Status: SHIPPED | OUTPATIENT
Start: 2022-06-23 | End: 2022-11-17

## 2022-06-23 RX ORDER — POTASSIUM CHLORIDE 20 MEQ/1
20 TABLET, EXTENDED RELEASE ORAL DAILY
Qty: 30 TABLET | Refills: 0 | Status: SHIPPED | OUTPATIENT
Start: 2022-06-23 | End: 2022-07-27 | Stop reason: SDUPTHER

## 2022-06-23 NOTE — TELEPHONE ENCOUNTER
----- Message from Katie Varela LPN sent at 6/23/2022  3:26 PM CDT -----  Contact: self  Please call patient and let her know we can only draw labs from providers in this office, unless instructed it is okay by Yaima to put in     ----- Message -----  From: Joyce Calloway  Sent: 6/23/2022   3:19 PM CDT  To: [facility] Staff    Can she bring orders from another doctor and have her blood work done at your office? Please call back at [phone]

## 2022-07-12 ENCOUNTER — CLINICAL SUPPORT (OUTPATIENT)
Dept: SMOKING CESSATION | Facility: CLINIC | Age: 57
End: 2022-07-12
Payer: COMMERCIAL

## 2022-07-12 DIAGNOSIS — F17.200 NICOTINE DEPENDENCE: Primary | ICD-10-CM

## 2022-07-12 PROCEDURE — 99404 PREV MED CNSL INDIV APPRX 60: CPT | Mod: S$GLB,,, | Performed by: GENERAL PRACTICE

## 2022-07-12 PROCEDURE — 99404 PR PREVENT COUNSEL,INDIV,60 MIN: ICD-10-PCS | Mod: S$GLB,,, | Performed by: GENERAL PRACTICE

## 2022-07-12 RX ORDER — VARENICLINE TARTRATE 1 MG/1
1 TABLET, FILM COATED ORAL 2 TIMES DAILY
Qty: 56 TABLET | Refills: 0 | Status: SHIPPED | OUTPATIENT
Start: 2022-07-12 | End: 2022-07-26

## 2022-07-12 NOTE — PROGRESS NOTES
Individual Follow-Up Form    7/12/2022    Clinical Status of Patient: Outpatient    Length of Service: 60 minutes    Continuing Medication: yes  Chantix    Other Medications: Nicotine Lozenges     Target Symptoms: Withdrawal and medication side effects. The following were  rated moderate (3) to severe (4) on TCRS:  · Moderate (3): none reported  · Severe (4): none reported    Comments: Spoke with patient at length in clinic regarding tobacco cessation progress. Patient remains on prescribed tobacco cessation medication 1mg Chantix and 4mg nicotine lozenge without any negative side effects at this time.  Patient decreased to 5 cigarettes daily.  Patient will cut down to 4 cigarettes a day this week.  Counselor reviewed strategies, habitual behavior, stress, and high risk situations. Introduced stress with addition interventions, SOLVE, relaxation with interventions, nutrition, exercise, weight gain, and the importance of rewarding oneself for accomplishments toward becoming tobacco free. Open discussion of all items with interventions.  Counselor suggested that patient set daily smoking limits by limiting the number of cigarettes she has access to daily.  Counselor also suggested that patient utilize jolly ranchers, dum dum lollipops, and toffee candy to help control nicotine cravings.  Counselor recommended that patient stay hydrated and eat fruits and vegetables to help decrease nicotine cravings.   Patient appeared to be receptive to the information given.  Counselor will continue to motivate patient to be tobacco free.     Diagnosis: F17.200    Next Visit: 1 week

## 2022-07-19 ENCOUNTER — CLINICAL SUPPORT (OUTPATIENT)
Dept: SMOKING CESSATION | Facility: CLINIC | Age: 57
End: 2022-07-19
Payer: COMMERCIAL

## 2022-07-19 DIAGNOSIS — F17.200 NICOTINE DEPENDENCE: Primary | ICD-10-CM

## 2022-07-19 PROCEDURE — 99404 PR PREVENT COUNSEL,INDIV,60 MIN: ICD-10-PCS | Mod: S$GLB,,, | Performed by: GENERAL PRACTICE

## 2022-07-19 PROCEDURE — 99404 PREV MED CNSL INDIV APPRX 60: CPT | Mod: S$GLB,,, | Performed by: GENERAL PRACTICE

## 2022-07-19 NOTE — PROGRESS NOTES
Individual Follow-Up Form    7/19/2022    Clinical Status of Patient: Outpatient    Length of Service: 60 minutes    Continuing Medication: yes  Chantix    Other Medications: Nicotine Lozenges     Target Symptoms: Withdrawal and medication side effects. The following were  rated moderate (3) to severe (4) on TCRS:  · Moderate (3): none reported  · Severe (4): none reported    Comments: Spoke with patient at length in clinic regarding tobacco cessation progress. Patient remains on prescribed tobacco cessation medication 1mg Chantix and 4mg Nicotine Lozenges without any negative side effects at this time.  Patient decreased to 5-6 cigarettes daily.  Patient will cut down to 4 cigarettes a day this week.  Counselor reviewed strategies, habitual behavior, stress, and high risk situations. Introduced stress with addition interventions, SOLVE, relaxation with interventions, nutrition, exercise, weight gain, and the importance of rewarding oneself for accomplishments toward becoming tobacco free. Open discussion of all items with interventions.  Patient stated that she is participating in physical therapy as a form of exercise. Counselor recommended that patient stay hydrated and eat fruits and vegetables to help decrease nicotine cravings.   Patient appeared to be receptive to the information given.  Counselor will continue to motivate patient to be tobacco free.     Diagnosis: F17.200    Next Visit: 1 week

## 2022-07-26 ENCOUNTER — CLINICAL SUPPORT (OUTPATIENT)
Dept: SMOKING CESSATION | Facility: CLINIC | Age: 57
End: 2022-07-26
Payer: COMMERCIAL

## 2022-07-26 DIAGNOSIS — F17.200 NICOTINE DEPENDENCE: Primary | ICD-10-CM

## 2022-07-26 DIAGNOSIS — J30.2 SEASONAL ALLERGIES: Primary | ICD-10-CM

## 2022-07-26 PROCEDURE — 99404 PR PREVENT COUNSEL,INDIV,60 MIN: ICD-10-PCS | Mod: S$GLB,,, | Performed by: GENERAL PRACTICE

## 2022-07-26 PROCEDURE — 99404 PREV MED CNSL INDIV APPRX 60: CPT | Mod: S$GLB,,, | Performed by: GENERAL PRACTICE

## 2022-07-26 RX ORDER — AZELASTINE 1 MG/ML
SPRAY, METERED NASAL
Qty: 30 ML | Refills: 1 | Status: SHIPPED | OUTPATIENT
Start: 2022-07-26 | End: 2022-11-17

## 2022-07-26 RX ORDER — BUPROPION HYDROCHLORIDE 150 MG/1
150 TABLET, EXTENDED RELEASE ORAL 2 TIMES DAILY
Qty: 60 TABLET | Refills: 0 | Status: SHIPPED | OUTPATIENT
Start: 2022-07-26 | End: 2022-08-25 | Stop reason: SDUPTHER

## 2022-07-26 NOTE — PROGRESS NOTES
Individual Follow-Up Form    7/26/2022    Clinical Status of Patient: Outpatient    Length of Service: 60 minutes    Continuing Medication: yes  Chantix    Other Medications: Nicotine Lozenges     Target Symptoms: Withdrawal and medication side effects. The following were  rated moderate (3) to severe (4) on TCRS:  · Moderate (3): none reported  · Severe (4): none reported    Comments: Spoke with patient at length in clinic regarding tobacco cessation progress. Patient remains on prescribed tobacco cessation medication 1mg Chantix and 4mg nicotine lozenges without any negative side effects at this time.  Patient decreased to 4 cigarettes daily.  Patient will cut down to 3 cigarettes a day this week.  Counselor reviewed strategies, habitual behavior, high risks situations, understanding urges and cravings, stress and relaxation with open discussion and additional interventions, Introduced lapses, relapses, understanding them and analyzing the situation of a lapse, conflict issues that may be linked to a lapse.  Patient stated that she feels her Chantix prescription is not as affective with helping her quit smoking and would like to discontinue and start using 150mg Wellbutrin.  Counselor advised patient to start taking a half dose starting tomorrow through Sunday and begin taking Wellbutrin on Monday.  Patient appeared to be receptive to the information given.  Counselor will continue to motivate patient to be tobacco free.    Diagnosis: F17.200    Next Visit: 1 week

## 2022-07-27 DIAGNOSIS — E87.6 LOW BLOOD POTASSIUM: ICD-10-CM

## 2022-07-27 RX ORDER — POTASSIUM CHLORIDE 20 MEQ/1
20 TABLET, EXTENDED RELEASE ORAL DAILY
Qty: 90 TABLET | Refills: 0 | Status: SHIPPED | OUTPATIENT
Start: 2022-07-27 | End: 2022-10-20

## 2022-07-27 NOTE — TELEPHONE ENCOUNTER
----- Message from Evelin Lr sent at 7/27/2022  8:51 AM CDT -----  Regarding: med refill  Contact: pt  Type:  RX Refill Request    Who Called:  Anca Grace  Refill or New Rx: refill   RX Name and Strength: potassium chloride SA (K-DUR,KLOR-CON) 20 MEQ tablet  How is the patient currently taking it? (ex. 1XDay): 1x day   Is this a 30 day or 90 day RX:90 day   Preferred Pharmacy with phone number:     [facility] - [facility] [place], [place] - [address] [facility]  [address] [place] [id number]  Phone: [phone] Fax: [phone]    Local or Mail Order:local   Ordering Provider: Heraclio  Would the patient rather a call back or a response via [facility]?  Call back   Best Call Back Number: [phone]  Additional Information:

## 2022-08-02 ENCOUNTER — CLINICAL SUPPORT (OUTPATIENT)
Dept: SMOKING CESSATION | Facility: CLINIC | Age: 57
End: 2022-08-02
Payer: COMMERCIAL

## 2022-08-02 DIAGNOSIS — F17.200 NICOTINE DEPENDENCE: Primary | ICD-10-CM

## 2022-08-02 PROCEDURE — 99404 PR PREVENT COUNSEL,INDIV,60 MIN: ICD-10-PCS | Mod: S$GLB,,, | Performed by: GENERAL PRACTICE

## 2022-08-02 PROCEDURE — 99404 PREV MED CNSL INDIV APPRX 60: CPT | Mod: S$GLB,,, | Performed by: GENERAL PRACTICE

## 2022-08-02 NOTE — PROGRESS NOTES
Individual Follow-Up Form    8/2/2022    Clinical Status of Patient: Outpatient    Length of Service: 60 minutes    Continuing Medication: yes  Wellbutrin     Target Symptoms: Withdurawal and medication side effects. The following were  rated moderate (3) to severe (4) on TCRS:  · Moderate (3): none reported  · Severe (4): none reported    Comments: Spoke with patient at length in clinic regarding tobacco cessation progress. Patient remains on prescribed tobacco cessation medication 150mg Wellbutrin without any negative side effects at this time.  Patient decreased to 5-6 cigarettes daily.   Patient will cut down to 4 cigarettes a day this week. Counselor reviewed strategies, habitual behavior, high risks situations, understanding urges and cravings, stress and relaxation with open discussion and additional interventions, Introduced lapses, relapses, understanding them and analyzing the situation of a lapse, conflict issues that may be linked to a lapse.  Patient stated that she experienced increase levels of stress that caused her to smoke 9 cigarettes for 2 days.  Patient is on day 2 of using Wellbutrin.  Counselor reviewed over the proper way to utilize Wellbutrin and the positive affects it can have on your quit attempt.  Patient appeared to be receptive to the information given.  Counselor will continue to motivate patient to be tobacco free.    Diagnosis: F17.200    Next Visit: 1 week

## 2022-08-09 ENCOUNTER — CLINICAL SUPPORT (OUTPATIENT)
Dept: SMOKING CESSATION | Facility: CLINIC | Age: 57
End: 2022-08-09
Payer: COMMERCIAL

## 2022-08-09 DIAGNOSIS — F17.200 NICOTINE DEPENDENCE: Primary | ICD-10-CM

## 2022-08-09 PROCEDURE — 90853 PR GROUP PSYCHOTHERAPY: ICD-10-PCS | Mod: S$GLB,,, | Performed by: GENERAL PRACTICE

## 2022-08-09 PROCEDURE — 90853 GROUP PSYCHOTHERAPY: CPT | Mod: S$GLB,,, | Performed by: GENERAL PRACTICE

## 2022-08-09 NOTE — PROGRESS NOTES
Individual Follow-Up Form    8/9/2022    Clinical Status of Patient: Outpatient    Length of Service: 60 minutes    Continuing Medication: yes  Wellbutrin    Other Medications:  Nicotine Lozenges     Target Symptoms: Withdrawal and medication side effects. The following were  rated moderate (3) to severe (4) on TCRS:  · Moderate (3): none reported  · Severe (4): none reported    Comments: Spoke with patient at length in clinic regarding tobacco cessation progress. Patient remains on prescribed tobacco cessation medication 150mg Wellburtrin and 4mg nicotine lozenges without any negative side effects at this time.  Patient decreased to 9 cigarettes daily.  Patient will cut down to 8 cigarettes a day this week.  Patient stated that she is not quite finishing her cigarettes.  Counselor reviewed strategies, habitual behavior, stress, and high risk situations. Introduced stress with addition interventions, SOLVE, relaxation with interventions, nutrition, exercise, weight gain, and the importance of rewarding oneself for accomplishments toward becoming tobacco free. Open discussion of all items with interventions.  Patient appeared to be receptive to the information given.  Counselor will continue to motivate patient to be tobacco free.    Diagnosis: F17.200    Next Visit: 1 week

## 2022-08-16 ENCOUNTER — CLINICAL SUPPORT (OUTPATIENT)
Dept: SMOKING CESSATION | Facility: CLINIC | Age: 57
End: 2022-08-16
Payer: COMMERCIAL

## 2022-08-16 DIAGNOSIS — F17.200 NICOTINE DEPENDENCE: Primary | ICD-10-CM

## 2022-08-16 PROCEDURE — 90853 PR GROUP PSYCHOTHERAPY: ICD-10-PCS | Mod: S$GLB,,, | Performed by: GENERAL PRACTICE

## 2022-08-16 PROCEDURE — 90853 GROUP PSYCHOTHERAPY: CPT | Mod: S$GLB,,, | Performed by: GENERAL PRACTICE

## 2022-08-16 NOTE — PROGRESS NOTES
Individual Follow-Up Form    8/16/2022    Clinical Status of Patient: Outpatient    Length of Service: 45 minutes    Continuing Medication: yes  Wellbutrin     Target Symptoms: Withdrawal and medication side effects. The following were  rated moderate (3) to severe (4) on TCRS:  · Moderate (3): none reported  · Severe (4): none reported    Comments: Spoke with patient at length in clinic regarding tobacco cessation progress. Patient remains on prescribed tobacco cessation medication 150mg Wellbutrin without any negative side effects at this time.  Patient decreased to 8-10 cigarettes daily.  Patient will cut down to 8 cigarettes a day this week.  Counselor reviewed strategies, habitual behavior, high risks situations, understanding urges and cravings, stress and relaxation with open discussion and additional interventions, Introduced lapses, relapses, understanding them and analyzing the situation of a lapse, conflict issues that may be linked to a lapse.  Patient stated that recent physical health pain has caused her slight increase in cigarette usage.  Counselor and patient discussed a plan to address patient's physical pain and utilize healthier coping skills.   Counselor also suggested that patient utilize jolly ranchers, dum dum lollipops, and toffee candy to help control nicotine cravings.  Patient appeared to be receptive to the information given.  Counselor will continue to motivate patient to be tobacco free.    Diagnosis: F17.200    Next Visit: 1 week

## 2022-08-19 DIAGNOSIS — I10 BENIGN ESSENTIAL HTN: Primary | ICD-10-CM

## 2022-08-19 DIAGNOSIS — E78.49 OTHER HYPERLIPIDEMIA: ICD-10-CM

## 2022-08-19 DIAGNOSIS — E11.9 TYPE 2 DIABETES MELLITUS WITHOUT COMPLICATION, WITHOUT LONG-TERM CURRENT USE OF INSULIN: ICD-10-CM

## 2022-08-21 LAB
ALBUMIN SERPL-MCNC: 3.9 G/DL (ref 3.6–5.1)
ALBUMIN/GLOB SERPL: 1.6 (CALC) (ref 1–2.5)
ALP SERPL-CCNC: 98 U/L (ref 37–153)
ALT SERPL-CCNC: 27 U/L (ref 6–29)
APPEARANCE UR: CLEAR
AST SERPL-CCNC: 14 U/L (ref 10–35)
BACTERIA #/AREA URNS HPF: ABNORMAL /HPF
BACTERIA UR CULT: NORMAL
BASOPHILS # BLD AUTO: 91 CELLS/UL (ref 0–200)
BASOPHILS NFR BLD AUTO: 0.8 %
BILIRUB SERPL-MCNC: 0.5 MG/DL (ref 0.2–1.2)
BILIRUB UR QL STRIP: NEGATIVE
BUN SERPL-MCNC: 23 MG/DL (ref 7–25)
BUN/CREAT SERPL: 17 (CALC) (ref 6–22)
CALCIUM SERPL-MCNC: 9.1 MG/DL (ref 8.6–10.4)
CHLORIDE SERPL-SCNC: 103 MMOL/L (ref 98–110)
CHOLEST SERPL-MCNC: 161 MG/DL
CHOLEST/HDLC SERPL: 3.2 (CALC)
CO2 SERPL-SCNC: 28 MMOL/L (ref 20–32)
COLOR UR: YELLOW
CREAT SERPL-MCNC: 1.35 MG/DL (ref 0.5–1.03)
EGFR: 46 ML/MIN/1.73M2
EOSINOPHIL # BLD AUTO: 319 CELLS/UL (ref 15–500)
EOSINOPHIL NFR BLD AUTO: 2.8 %
ERYTHROCYTE [DISTWIDTH] IN BLOOD BY AUTOMATED COUNT: 16.5 % (ref 11–15)
GLOBULIN SER CALC-MCNC: 2.4 G/DL (CALC) (ref 1.9–3.7)
GLUCOSE SERPL-MCNC: 213 MG/DL (ref 65–99)
GLUCOSE UR QL STRIP: ABNORMAL
HBA1C MFR BLD: 6.5 % OF TOTAL HGB
HCT VFR BLD AUTO: 37.6 % (ref 35–45)
HDLC SERPL-MCNC: 50 MG/DL
HGB BLD-MCNC: 12.5 G/DL (ref 11.7–15.5)
HGB UR QL STRIP: NEGATIVE
HYALINE CASTS #/AREA URNS LPF: ABNORMAL /LPF
KETONES UR QL STRIP: NEGATIVE
LDLC SERPL CALC-MCNC: 86 MG/DL (CALC)
LEUKOCYTE ESTERASE UR QL STRIP: ABNORMAL
LYMPHOCYTES # BLD AUTO: 1972 CELLS/UL (ref 850–3900)
LYMPHOCYTES NFR BLD AUTO: 17.3 %
MCH RBC QN AUTO: 29 PG (ref 27–33)
MCHC RBC AUTO-ENTMCNC: 33.2 G/DL (ref 32–36)
MCV RBC AUTO: 87.2 FL (ref 80–100)
MONOCYTES # BLD AUTO: 1368 CELLS/UL (ref 200–950)
MONOCYTES NFR BLD AUTO: 12 %
NEUTROPHILS # BLD AUTO: 7649 CELLS/UL (ref 1500–7800)
NEUTROPHILS NFR BLD AUTO: 67.1 %
NITRITE UR QL STRIP: NEGATIVE
NONHDLC SERPL-MCNC: 111 MG/DL (CALC)
PH UR STRIP: 7.5 [PH] (ref 5–8)
PLATELET # BLD AUTO: 254 THOUSAND/UL (ref 140–400)
PMV BLD REES-ECKER: 11.2 FL (ref 7.5–12.5)
POTASSIUM SERPL-SCNC: 4.6 MMOL/L (ref 3.5–5.3)
PROT SERPL-MCNC: 6.3 G/DL (ref 6.1–8.1)
PROT UR QL STRIP: NEGATIVE
RBC # BLD AUTO: 4.31 MILLION/UL (ref 3.8–5.1)
RBC #/AREA URNS HPF: ABNORMAL /HPF
SERVICE CMNT-IMP: ABNORMAL
SODIUM SERPL-SCNC: 141 MMOL/L (ref 135–146)
SP GR UR STRIP: 1.02 (ref 1–1.03)
SQUAMOUS #/AREA URNS HPF: ABNORMAL /HPF
TRIGL SERPL-MCNC: 150 MG/DL
WBC # BLD AUTO: 11.4 THOUSAND/UL (ref 3.8–10.8)
WBC #/AREA URNS HPF: ABNORMAL /HPF
YEAST #/AREA URNS HPF: ABNORMAL /HPF

## 2022-08-23 ENCOUNTER — CLINICAL SUPPORT (OUTPATIENT)
Dept: SMOKING CESSATION | Facility: CLINIC | Age: 57
End: 2022-08-23
Payer: COMMERCIAL

## 2022-08-23 ENCOUNTER — OFFICE VISIT (OUTPATIENT)
Dept: PRIMARY CARE CLINIC | Facility: CLINIC | Age: 57
End: 2022-08-23
Payer: MEDICARE

## 2022-08-23 VITALS
SYSTOLIC BLOOD PRESSURE: 123 MMHG | WEIGHT: 259 LBS | BODY MASS INDEX: 43.15 KG/M2 | RESPIRATION RATE: 18 BRPM | HEART RATE: 86 BPM | HEIGHT: 65 IN | DIASTOLIC BLOOD PRESSURE: 73 MMHG | OXYGEN SATURATION: 95 %

## 2022-08-23 DIAGNOSIS — M50.30 DEGENERATIVE DISC DISEASE, CERVICAL: ICD-10-CM

## 2022-08-23 DIAGNOSIS — B37.31 VAGINAL YEAST INFECTION: ICD-10-CM

## 2022-08-23 DIAGNOSIS — E78.49 OTHER HYPERLIPIDEMIA: ICD-10-CM

## 2022-08-23 DIAGNOSIS — M51.36 DEGENERATIVE DISC DISEASE, LUMBAR: ICD-10-CM

## 2022-08-23 DIAGNOSIS — E11.9 TYPE 2 DIABETES MELLITUS WITHOUT COMPLICATION, WITHOUT LONG-TERM CURRENT USE OF INSULIN: ICD-10-CM

## 2022-08-23 DIAGNOSIS — E66.01 MORBID OBESITY WITH BMI OF 40.0-44.9, ADULT: ICD-10-CM

## 2022-08-23 DIAGNOSIS — Z79.4 TYPE 2 DIABETES MELLITUS WITH OTHER NEUROLOGIC COMPLICATION, WITH LONG-TERM CURRENT USE OF INSULIN: ICD-10-CM

## 2022-08-23 DIAGNOSIS — E11.49 TYPE 2 DIABETES MELLITUS WITH OTHER NEUROLOGIC COMPLICATION, WITH LONG-TERM CURRENT USE OF INSULIN: ICD-10-CM

## 2022-08-23 DIAGNOSIS — F17.200 NICOTINE DEPENDENCE: Primary | ICD-10-CM

## 2022-08-23 DIAGNOSIS — I10 BENIGN ESSENTIAL HTN: Primary | ICD-10-CM

## 2022-08-23 DIAGNOSIS — E03.8 OTHER SPECIFIED HYPOTHYROIDISM: ICD-10-CM

## 2022-08-23 PROCEDURE — 3008F BODY MASS INDEX DOCD: CPT | Mod: CPTII,S$GLB,, | Performed by: NURSE PRACTITIONER

## 2022-08-23 PROCEDURE — 90853 GROUP PSYCHOTHERAPY: CPT | Mod: S$GLB,,, | Performed by: GENERAL PRACTICE

## 2022-08-23 PROCEDURE — 1160F RVW MEDS BY RX/DR IN RCRD: CPT | Mod: CPTII,S$GLB,, | Performed by: NURSE PRACTITIONER

## 2022-08-23 PROCEDURE — 99214 OFFICE O/P EST MOD 30 MIN: CPT | Mod: S$GLB,,, | Performed by: NURSE PRACTITIONER

## 2022-08-23 PROCEDURE — 3074F PR MOST RECENT SYSTOLIC BLOOD PRESSURE < 130 MM HG: ICD-10-PCS | Mod: CPTII,S$GLB,, | Performed by: NURSE PRACTITIONER

## 2022-08-23 PROCEDURE — 1160F PR REVIEW ALL MEDS BY PRESCRIBER/CLIN PHARMACIST DOCUMENTED: ICD-10-PCS | Mod: CPTII,S$GLB,, | Performed by: NURSE PRACTITIONER

## 2022-08-23 PROCEDURE — 4010F ACE/ARB THERAPY RXD/TAKEN: CPT | Mod: CPTII,S$GLB,, | Performed by: NURSE PRACTITIONER

## 2022-08-23 PROCEDURE — 1159F MED LIST DOCD IN RCRD: CPT | Mod: CPTII,S$GLB,, | Performed by: NURSE PRACTITIONER

## 2022-08-23 PROCEDURE — 3078F PR MOST RECENT DIASTOLIC BLOOD PRESSURE < 80 MM HG: ICD-10-PCS | Mod: CPTII,S$GLB,, | Performed by: NURSE PRACTITIONER

## 2022-08-23 PROCEDURE — 3078F DIAST BP <80 MM HG: CPT | Mod: CPTII,S$GLB,, | Performed by: NURSE PRACTITIONER

## 2022-08-23 PROCEDURE — 90853 PR GROUP PSYCHOTHERAPY: ICD-10-PCS | Mod: S$GLB,,, | Performed by: GENERAL PRACTICE

## 2022-08-23 PROCEDURE — 4010F PR ACE/ARB THEARPY RXD/TAKEN: ICD-10-PCS | Mod: CPTII,S$GLB,, | Performed by: NURSE PRACTITIONER

## 2022-08-23 PROCEDURE — 3044F PR MOST RECENT HEMOGLOBIN A1C LEVEL <7.0%: ICD-10-PCS | Mod: CPTII,S$GLB,, | Performed by: NURSE PRACTITIONER

## 2022-08-23 PROCEDURE — 1159F PR MEDICATION LIST DOCUMENTED IN MEDICAL RECORD: ICD-10-PCS | Mod: CPTII,S$GLB,, | Performed by: NURSE PRACTITIONER

## 2022-08-23 PROCEDURE — 3008F PR BODY MASS INDEX (BMI) DOCUMENTED: ICD-10-PCS | Mod: CPTII,S$GLB,, | Performed by: NURSE PRACTITIONER

## 2022-08-23 PROCEDURE — 99214 PR OFFICE/OUTPT VISIT, EST, LEVL IV, 30-39 MIN: ICD-10-PCS | Mod: S$GLB,,, | Performed by: NURSE PRACTITIONER

## 2022-08-23 PROCEDURE — 3044F HG A1C LEVEL LT 7.0%: CPT | Mod: CPTII,S$GLB,, | Performed by: NURSE PRACTITIONER

## 2022-08-23 PROCEDURE — 3074F SYST BP LT 130 MM HG: CPT | Mod: CPTII,S$GLB,, | Performed by: NURSE PRACTITIONER

## 2022-08-23 RX ORDER — PREGABALIN 300 MG/1
300 CAPSULE ORAL 2 TIMES DAILY
COMMUNITY
End: 2024-01-22 | Stop reason: ALTCHOICE

## 2022-08-23 RX ORDER — TIRZEPATIDE 10 MG/.5ML
INJECTION, SOLUTION SUBCUTANEOUS
COMMUNITY
Start: 2022-08-18 | End: 2023-02-23

## 2022-08-23 RX ORDER — LEVOTHYROXINE SODIUM 88 UG/1
88 TABLET ORAL
Qty: 30 TABLET | Refills: 2 | Status: SHIPPED | OUTPATIENT
Start: 2022-08-23 | End: 2022-11-21 | Stop reason: SDUPTHER

## 2022-08-23 RX ORDER — FLUCONAZOLE 150 MG/1
150 TABLET ORAL DAILY
Qty: 2 TABLET | Refills: 0 | Status: SHIPPED | OUTPATIENT
Start: 2022-08-23 | End: 2022-08-25

## 2022-08-23 NOTE — PROGRESS NOTES
Subjective:       Patient ID: Anca Grace is a 56 y.o. female.    Chief Complaint: Follow-up    HPI: Anca is a 56 y.o. female who presents for F/U and to discuss lab results.      Cholesterol normal on recent labs. Kidney function slightly decreased. HGBA1C greatly improved, now down to 6.5. Urine shows a yeast infection but denies symptoms.    Feels well today, only complaint is fatigue.    Was found to have a UTI and yeast on recent labs so was treated with antibiotics. Her A1C was elevated at 9.6 and also her cholesterol. All other labs were normal. She c/o still having vaginal discomfort and itching with a small amount of discharge.    DM II -  Chronic and uncontrolled on meds, b/p averaging < 130/80, denies s/e or a/r. Followed by Walt David for Endocrinology for her DM Type 2 and Hypothyroidism. Seen in July by Walt David and her Toujeo was increased to 160 units daily, Mounjaro weekly and Xigduo daily. Her blood sugars run between .  Has Neuropathy and she takes Lyrica for it which is prescribed by Orthopedic.    Hyperlipidemia - controlled with a STATIN. Denies se/ar. Say she has been trying to eat healthier.    Hypothyroidism - takes levothyroxine 75 mcg daily. TSH was 3.14 in March and she has still been fatigued on most days. Denies se/ar to medication.    Followed by Dr. Angel in [place]. Had a carpal tunnel surgery, scope to left knee last August and some of her bone was scraped for her arthritis. Follows up monthly for muscle relaxer injections. Elavil increased to 50 mg daily and still on Lyrica as well. Also followed by Dr. Gallegos for lower back pain and he does back injections.    Has cut down on smoking from 1 ppd to 6-7 cigarettes daily and has smoked for 15 years. Still doing smoking cessation and is now taking wellbutrin. It is working well for her.     Mammogram done last year in [place] and was normal.    Had a Colonoscopy done at [facility] which a polyp was found, repeat in 5  years.    History of Hysterectomy in 2012.    UTD with COVID and FLU vaccines.              Past Medical History:   Diagnosis Date    Diabetes mellitus, type 2     GERD (gastroesophageal reflux disease)     Hyperlipidemia     Hypertension        Past Surgical History:   Procedure Laterality Date    CARPAL TUNNEL RELEASE      HYSTERECTOMY         Family History   Problem Relation Age of Onset    Thyroiditis Mother     Heart disease Father     Graves' disease Sister     Heart disease Maternal Grandfather     Cancer Paternal Grandfather        Social History     Tobacco Use    Smoking status: Every Day     Types: Cigarettes    Smokeless tobacco: Never   Substance Use Topics    Alcohol use: Never    Drug use: Never       Patient Active Problem List   Diagnosis    Degenerative disc disease, cervical    Degenerative disc disease, lumbar    Neck pain    Bilateral arm pain    Low back pain radiating to right leg    Morbid obesity with BMI of 40.0-44.9, adult    Hyperlipidemia    Benign essential HTN       Immunization History   Administered Date(s) Administered    COVID-19, MRNA, LN-S, PF (MODERNA FULL 0.5 ML DOSE) 10/30/2021, 11/27/2021    DTaP 1966, 01/18/1967, 03/08/1967, 04/10/1968, 03/03/1971, 08/01/1977    IPV 1966, 01/18/1967, 03/08/1967, 04/10/1968, 04/07/1978    Influenza - Quadrivalent - PF *Preferred* (6 months and older) 10/14/2021    Measles 11/07/1967    Mumps 11/13/1968    Rubella 10/21/1970    Tdap 04/13/1988, 08/06/2004           Review of Systems   Constitutional:  Negative for activity change, appetite change, chills, diaphoresis, fatigue and fever.   HENT:  Negative for tinnitus and trouble swallowing.    Eyes:  Negative for visual disturbance.   Respiratory:  Negative for cough, chest tightness, shortness of breath and wheezing.    Cardiovascular:  Negative for chest pain, palpitations and leg swelling.   Gastrointestinal:  Negative for abdominal distention, abdominal pain, blood in stool,  "constipation, diarrhea, nausea and vomiting.   Endocrine: Negative for cold intolerance, heat intolerance, polydipsia, polyphagia and polyuria.   Genitourinary:  Positive for urgency and vaginal discharge. Negative for decreased urine volume, dysuria, frequency, hematuria, pelvic pain, vaginal bleeding and vaginal pain.   Musculoskeletal:  Positive for arthralgias (knees). Negative for back pain, gait problem and neck pain.   Skin:  Negative for color change and rash.   Neurological:  Negative for dizziness, syncope, weakness, light-headedness, numbness and headaches.   Psychiatric/Behavioral:  Negative for behavioral problems, confusion and dysphoric mood. The patient is not nervous/anxious.    Objective:     Vitals:    08/23/22 1303   BP: 123/73   BP Location: Left arm   Patient Position: Sitting   BP Method: Large (Automatic)   Pulse: 86   Resp: 18   SpO2: 95%   Weight: 117.5 kg (259 lb)   Height: 5' 5" (1.651 m)       Physical Exam  Vitals and nursing note reviewed.   Constitutional:       General: She is not in acute distress.     Appearance: Normal appearance. She is not diaphoretic.   HENT:      Head: Normocephalic and atraumatic.      Nose: Nose normal.      Mouth/Throat:      Mouth: Mucous membranes are moist.      Pharynx: Oropharynx is clear.   Eyes:      Extraocular Movements: Extraocular movements intact.      Conjunctiva/sclera: Conjunctivae normal.      Pupils: Pupils are equal, round, and reactive to light.   Neck:      Thyroid: No thyromegaly or thyroid tenderness.   Cardiovascular:      Rate and Rhythm: Normal rate and regular rhythm.      Pulses: Normal pulses.      Heart sounds: Normal heart sounds.   Pulmonary:      Effort: Pulmonary effort is normal.      Breath sounds: Normal breath sounds. No decreased air movement. No wheezing or rales.   Abdominal:      General: There is no distension.      Tenderness: There is no abdominal tenderness.   Musculoskeletal:         General: No tenderness. " Normal range of motion.      Cervical back: Normal range of motion and neck supple.      Right lower leg: No edema.      Left lower leg: No edema.   Lymphadenopathy:      Cervical: No cervical adenopathy.   Skin:     General: Skin is warm and dry.   Neurological:      General: No focal deficit present.      Mental Status: She is alert and oriented to person, place, and time.   Psychiatric:         Mood and Affect: Mood and affect normal.         Behavior: Behavior normal. Behavior is cooperative.       Orders Only on 08/19/2022   Component Date Value Ref Range Status    Hemoglobin A1C 08/19/2022 6.5 (H)  <5.7 % of total Hgb Final    WBC 08/19/2022 11.4 (H)  3.8 - 10.8 Thousand/uL Final    RBC 08/19/2022 4.31  3.80 - 5.10 Million/uL Final    Hemoglobin 08/19/2022 12.5  11.7 - 15.5 g/dL Final    Hematocrit 08/19/2022 37.6  35.0 - 45.0 % Final    MCV 08/19/2022 87.2  80.0 - 100.0 fL Final    MCH 08/19/2022 29.0  27.0 - 33.0 pg Final    MCHC 08/19/2022 33.2  32.0 - 36.0 g/dL Final    RDW 08/19/2022 16.5 (H)  11.0 - 15.0 % Final    Platelets 08/19/2022 254  140 - 400 Thousand/uL Final    MPV 08/19/2022 11.2  7.5 - 12.5 fL Final    Neutrophils, Abs 08/19/2022 7,649  1,500 - 7,800 cells/uL Final    Lymph # 08/19/2022 1,972  850 - 3,900 cells/uL Final    Mono # 08/19/2022 1,368 (H)  200 - 950 cells/uL Final    Eos # 08/19/2022 319  15 - 500 cells/uL Final    Baso # 08/19/2022 91  0 - 200 cells/uL Final    Neutrophils Relative 08/19/2022 67.1  % Final    Lymph % 08/19/2022 17.3  % Final    Mono % 08/19/2022 12.0  % Final    Eosinophil % 08/19/2022 2.8  % Final    Basophil % 08/19/2022 0.8  % Final    Glucose 08/19/2022 213 (H)  65 - 99 mg/dL Final    BUN 08/19/2022 23  7 - 25 mg/dL Final    Creatinine 08/19/2022 1.35 (H)  0.50 - 1.03 mg/dL Final    EGFR 08/19/2022 46 (L)  > OR = 60 mL/min/1.73m2 Final    BUN/Creatinine Ratio 08/19/2022 17  6 - 22 (calc) Final    Sodium 08/19/2022 141  135 - 146 mmol/L Final    Potassium  08/19/2022 4.6  3.5 - 5.3 mmol/L Final    Chloride 08/19/2022 103  98 - 110 mmol/L Final    CO2 08/19/2022 28  20 - 32 mmol/L Final    Calcium 08/19/2022 9.1  8.6 - 10.4 mg/dL Final    Total Protein 08/19/2022 6.3  6.1 - 8.1 g/dL Final    Albumin 08/19/2022 3.9  3.6 - 5.1 g/dL Final    Globulin, Total 08/19/2022 2.4  1.9 - 3.7 g/dL (calc) Final    Albumin/Globulin Ratio 08/19/2022 1.6  1.0 - 2.5 (calc) Final    Total Bilirubin 08/19/2022 0.5  0.2 - 1.2 mg/dL Final    Alkaline Phosphatase 08/19/2022 98  37 - 153 U/L Final    AST 08/19/2022 14  10 - 35 U/L Final    ALT 08/19/2022 27  6 - 29 U/L Final    Cholesterol 08/19/2022 161  <200 mg/dL Final    HDL 08/19/2022 50  > OR = 50 mg/dL Final    Triglycerides 08/19/2022 150 (H)  <150 mg/dL Final    LDL Cholesterol 08/19/2022 86  mg/dL (calc) Final    HDL/Cholesterol Ratio 08/19/2022 3.2  <5.0 (calc) Final    Non HDL Chol. (LDL+VLDL) 08/19/2022 111  <130 mg/dL (calc) Final    Color, UA 08/19/2022 YELLOW  YELLOW Final    Appearance, UA 08/19/2022 CLEAR  CLEAR Final    Specific Gravity, UA 08/19/2022 1.021  1.001 - 1.035 Final    pH, UA 08/19/2022 7.5  5.0 - 8.0 Final    Glucose, UA 08/19/2022 3+ (A)  NEGATIVE Final    Bilirubin, UA 08/19/2022 NEGATIVE  NEGATIVE Final    Ketones, UA 08/19/2022 NEGATIVE  NEGATIVE Final    Occult Blood UA 08/19/2022 NEGATIVE  NEGATIVE Final    Protein, UA 08/19/2022 NEGATIVE  NEGATIVE Final    Nitrite, UA 08/19/2022 NEGATIVE  NEGATIVE Final    Leukocytes, UA 08/19/2022 1+ (A)  NEGATIVE Final    WBC Casts, UA 08/19/2022 20-40 (A)  < OR = 5 /HPF Final    RBC Casts, UA 08/19/2022 NONE SEEN  < OR = 2 /HPF Final    Squam Epithel, UA 08/19/2022 0-5  < OR = 5 /HPF Final    Bacteria, UA 08/19/2022 FEW (A)  NONE SEEN /HPF Final    Hyaline Casts, UA 08/19/2022 NONE SEEN  NONE SEEN /LPF Final    Yeast, UA 08/19/2022 FEW (A)  NONE SEEN /HPF Final    Service Cmt: 08/19/2022    Final    Urine Culture, Routine 08/19/2022    Final     Reviewed and discussed  lab results in depth with full understanding.        Assessment:      1. Benign essential HTN    2. Other hyperlipidemia    3. Morbid obesity with BMI of 40.0-44.9, adult    4. Degenerative disc disease, cervical    5. Degenerative disc disease, lumbar    6. Vaginal yeast infection    7. Other specified hypothyroidism    8. Type 2 diabetes mellitus without complication, without long-term current use of insulin    9. Type 2 diabetes mellitus with other neurologic complication, with long-term current use of insulin          Plan:     Benign essential HTN  -     Comprehensive Metabolic Panel; Future; Expected date: 09/23/2022  -     TSH w/reflex to FT4; Future; Expected date: 09/23/2022    Other hyperlipidemia    Morbid obesity with BMI of 40.0-44.9, adult    Degenerative disc disease, cervical    Degenerative disc disease, lumbar    Vaginal yeast infection  -     fluconazole (DIFLUCAN) 150 MG Tab; Take 1 tablet (150 mg total) by mouth once daily. for 2 days  Dispense: 2 tablet; Refill: 0    Other specified hypothyroidism  -     levothyroxine (SYNTHROID) 88 MCG tablet; Take 1 tablet (88 mcg total) by mouth before breakfast.  Dispense: 30 tablet; Refill: 2  -     TSH w/reflex to FT4; Future; Expected date: 09/23/2022    Type 2 diabetes mellitus without complication, without long-term current use of insulin  -     Hemoglobin A1C; Future; Expected date: 09/23/2022  -     Comprehensive Metabolic Panel; Future; Expected date: 09/23/2022    Type 2 diabetes mellitus with other neurologic complication, with long-term current use of insulin       Current Outpatient Medications   Medication Sig Dispense Refill    albuterol (PROVENTIL/VENTOLIN HFA) 90 mcg/actuation inhaler INHALE 2 PUFFS BY MOUTH EVERY EVENING AS NEEDED FOR SHORTNESS OF BREATH OR FOR WHEEZING      amitriptyline (ELAVIL) 25 MG tablet Take 25 mg by mouth once daily.      ammonium lactate (LAC-HYDRIN) 12 % lotion APPLY TO AFFECTED AREA 2 TIMES A DAY thank youmike -)       atorvastatin (LIPITOR) 80 MG tablet Take 80 mg by mouth once daily.      azelastine (ASTELIN) 137 mcg (0.1 %) nasal spray SPRAY 1 SPRAY INTRANASALLY 2 TIMES A DAY 30 mL 1    cholecalciferol, vitamin D3, 1,250 mcg (50,000 unit) capsule TAKE 1 CAPSULE BY MOUTH WEEKLY AS DIRECTED thank youmike -)      COMBIVENT RESPIMAT  mcg/actuation inhaler INHALE 1 puff BY MOUTH 4 TIMES A DAY 4 g 2    cyclobenzaprine (FLEXERIL) 10 MG tablet TAKE 1 TABLET BY MOUTH 3 TIMES A DAY AS NEEDED FOR MUSCLE SPASMS MAY MAKE DROWSY      estradioL (ESTRACE) 1 MG tablet Take 1 mg by mouth once daily.      fluocinonide (LIDEX) 0.05 % external solution APPLY to to AFFECTED AREA DAILY      fluticasone (VERAMYST) 27.5 mcg/actuation nasal spray 2 sprays by Nasal route.      folic acid (FOLVITE) 1 MG tablet Take 1,000 mcg by mouth once daily.      ketoconazole (NIZORAL) 2 % shampoo APPLY TOPICALLY to wash AFFECTED AREA DAILY      loratadine (CLARITIN) 10 mg tablet Take 10 mg by mouth.      losartan (COZAAR) 100 MG tablet Take 100 mg by mouth once daily.      montelukast (SINGULAIR) 10 mg tablet Take 10 mg by mouth every evening.      MOUNJARO 10 mg/0.5 mL PnIj INJECT 10 mg SUBCUTANEOUS once a week      nicotine polacrilex 4 MG Lozg Take 1 lozenge (4 mg total) by mouth as needed (Do not use more than 10 pieces in 24 hours). 216 lozenge 0    omeprazole (PRILOSEC) 20 MG capsule Take 1 capsule (20 mg total) by mouth once daily. 30 capsule 2    potassium chloride SA (K-DUR,KLOR-CON) 20 MEQ tablet Take 1 tablet (20 mEq total) by mouth once daily. 90 tablet 0    pregabalin (LYRICA) 300 MG Cap Take 300 mg by mouth 2 (two) times a day.      TOUJEO MAX U-300 SOLOSTAR 300 unit/mL (3 mL) insulin pen INJECT 100 UNITS SUBCUTANEOUS once DAILY      triamcinolone acetonide 0.1% (KENALOG) 0.1 % cream APPLY TOPICALLY 2 TIMES A DAY. Apply to Frontal Scalp, Left Superior Cutaneous Lip (Apical Triangle), Right Superior Cutaneous Lip (Apical Triangle)]       "TRUEPLUS PEN NEEDLE 32 gauge x 5/32" Ndle USE DAILY OR AS DIRECTED      XIGDUO XR 5-1,000 mg TBph TAKE 2 TABLETS BY MOUTH EACH MORNING      buPROPion (WELLBUTRIN SR) 150 MG TBSR 12 hr tablet Take 1 tablet (150 mg total) by mouth 2 (two) times daily. 60 tablet 0    levothyroxine (SYNTHROID) 88 MCG tablet Take 1 tablet (88 mcg total) by mouth before breakfast. 30 tablet 2     No current facility-administered medications for this visit.       Medications Discontinued During This Encounter   Medication Reason    furosemide (LASIX) 40 MG tablet Patient no longer taking    pravastatin (PRAVACHOL) 80 MG tablet Patient no longer taking    pregabalin (LYRICA) 300 MG Cap Patient no longer taking    TRULICITY 4.5 mg/0.5 mL pen injector Patient no longer taking    nitrofurantoin, macrocrystal-monohydrate, (MACROBID) 100 MG capsule Patient no longer taking    ergocalciferol (ERGOCALCIFEROL) 50,000 unit Cap Duplicate Order    traMADoL (ULTRAM) 50 mg tablet Patient no longer taking    levothyroxine (SYNTHROID) 88 MCG tablet Reorder       Health Maintenance   Topic Date Due    Foot Exam  Never done    Eye Exam  Never done    Mammogram  Never done    TETANUS VACCINE  08/06/2014    Hepatitis C Screening  02/21/2023 (Originally 1965)    Hemoglobin A1c  02/19/2023    Lipid Panel  08/19/2023    Low Dose Statin  08/23/2023       Patient Instructions   RTC in 3 months for F/U or sooner if needed.    Keep appts with specialists as scheduled.    Instructed patient to report to nearest ER or call 911 if begins to have difficulty breathing, turning blue, chest pain, B/P < 80/60 or >170/100, palpitations, syncope, extreme weakness, or severe H/A. Patient verbalized understanding.      Patient Education       Diabetes and Diet   The Basics   Written by the doctors and editors at [facility]   Why is diet important in diabetes? -- Diet is important because it is part of diabetes treatment. Many people need to change what they eat and how much " "they eat to help treat their diabetes. It is important for people to treat their diabetes so that they:  Keep their blood sugar at or near a normal level  Prevent long-term problems, such as heart or kidney problems, that can happen in people with diabetes  Changing your diet can also help treat obesity, high blood pressure, and high cholesterol. These conditions can affect people with diabetes and can lead to future problems, such as heart attacks or strokes.  Who will work with me to change my diet? -- Your doctor or nurse will work with you to make a food plan to change your diet. They might also recommend that you work with a "dietitian." A dietitian is an expert on food and eating.  Do I need to eat at the same times every day? -- When and how often you should eat depends, in part, on the diabetes medicines you take. For example:  People who take about the same amount of insulin at the same time each day (called a "fixed regimen") should eat meals at the same times. This is also true for people who take pills that increase insulin levels, such as sulfonylureas. Eating meals at the same time every day helps prevent low blood sugar.  People who adjust the dose and timing of their insulin each day (called a "flexible regimen") do not always have to eat meals at the same time. That's because they can time their insulin dose for before they plan to eat, and also adjust the dose for how much they plan to eat.  People who take medicines that don't usually cause low blood sugar, such as metformin, don't have to eat meals at the same time every day.  What do I need to think about when planning what to eat? -- Our bodies break down the food we eat into small pieces called carbohydrates, proteins, and fats.  When planning what to eat, people with diabetes need to think about:  Carbohydrates (or "carbs") - Carbohydrates, which are sugars that our bodies use for energy, can raise a person's blood sugar level. Your doctor, " "nurse, or dietitian will tell you how many carbohydrates you should eat at each meal or snack. Foods that have carbohydrates include:  Bread, pasta, and rice  Vegetables and fruits  Dairy foods  Foods and drinks with added sugar  It is best to get your carbohydrates from fruits, vegetables, whole grains, and low-fat milk. It is best to avoid drinks with added sugar, like soda, juices, and sports drinks.   Protein - Your doctor, nurse, or dietitian will tell you how much protein you should eat each day. It is best to eat lean meats, fish, eggs, beans, peas, soy products, nuts, and seeds.  Fats - The type of fat you eat is more important than the amount of fat. "Saturated" and "trans" fats can increase your risk for heart problems, like a heart attack.  Foods that have saturated fats include meat, butter, cheese, and ice cream.  Foods that have trans fats include processed food with "partially hydrogenated oils" on the ingredient list. This may include fried foods, store bought cookies, muffins, pies, and cakes.  "Monounsaturated" and "polyunsaturated" fats are better for you. Foods with these types of fat include fish, avocado, olive oil, and nuts.  Calories - People need to eat a certain amount of calories each day to keep their weight the same. People who are overweight and want to lose weight need to eat fewer calories each day.  Fiber - Eating foods with a lot of fiber can help control a person's blood sugar level. Foods that have a lot of fiber include apples, green beans, peas, beans, lentils, nuts, oatmeal, and whole grains.  Salt - People who have high blood pressure should not eat foods that contain a lot of salt (also called sodium). People with high blood pressure should also eat healthy foods, such as fruits, vegetables, and low-fat dairy foods.  Alcohol - Having more than 1 drink (for women) or 2 drinks (for men) a day can raise blood sugar levels. Also, drinks that have fruit juice or soda in them can " raise blood sugar levels.  What can I do if I need to lose weight? -- If you need to lose weight, you can:  Exercise - Try to get at least 30 minutes of physical activity a day, most days of the week. Even gentle exercise, like walking, is good for your health. Some people with diabetes need to change their medicine dose before they exercise. They might also need to check their blood sugar levels before and after exercising.  Eat fewer calories - Your doctor, nurse, or dietitian can tell you how many calories you should eat each day in order to lose weight.  If you are worried about your weight, size, or shape, talk with your doctor, nurse, or dietitian. They can help you make changes to improve your health.  Can I eat the same foods as my family? -- Yes. You do not need to eat special foods if you have diabetes. You and your family can eat the same foods. Changing your diet is mostly about eating healthy foods and not eating too much.  What are the other parts of diabetes treatment? -- Besides changing your diet, the other parts of diabetes treatment are:  Exercise  Medicines  Some people with diabetes need to learn how to match their diet and exercise with their medicine dose. For example, people who use insulin might need to choose the dose of insulin they give themselves. To choose their dose, they need to think about:  What they plan to eat at the next meal  How much exercise they plan to do  What their blood sugar level is  If the diet and exercise do not match the medicine dose, a person's blood sugar level can get too low or too high. Blood sugar levels that are too low or too high can cause problems.  All topics are updated as new evidence becomes available and our peer review process is complete.  This topic retrieved from [employer] on: Sep 21, 2021.  Topic 38748 Version 7.0  Release: 29.4.2 - C29.263  © 2021 UpToDate, Inc. and/or its affiliates. All rights reserved.  Consumer Information Use and Disclaimer    This information is not specific medical advice and does not replace information you receive from your health care provider. This is only a brief summary of general information. It does NOT include all information about conditions, illnesses, injuries, tests, procedures, treatments, therapies, discharge instructions or life-style choices that may apply to you. You must talk with your health care provider for complete information about your health and treatment options. This information should not be used to decide whether or not to accept your health care provider's advice, instructions or recommendations. Only your health care provider has the knowledge and training to provide advice that is right for you. The use of this information is governed by the [employer] End User License Agreement, available at https://www.[employer]/en/solutions/[employer]/about/bhargav.The use of [employer] content is governed by the [employer] Terms of Use. ©2021 UpToDate, Inc. All rights reserved.  Copyright   © 2021 UpToDate, Inc. and/or its affiliates. All rights reserved.  Patient Education       Low Cholesterol, Saturated Fat, and Trans Fat Diet   About this topic   Cholesterol, saturated fat, and trans fat are in many foods. These may raise your blood cholesterol levels. If your cholesterol is too high, this can cause health problems in your heart, liver, kidneys, and even your eyes. The key to lowering your risk of heart problems is to lower your bad fat intake.  Saturated fats and trans fats are the bad fats. These fats clog your arteries and raise your bad cholesterol. Saturated fats and trans fats are solid fats at room temperature. Saturated fats are animal fats. Trans fats are manmade fats. They add flavor to a lot of packaged foods. Staying away from saturated and trans fats will help your heart.  When you do eat foods with fat, make sure they have the good fats. Monounsaturated and polyunsaturated fats are good fats. These  "fats help raise your good cholesterol and protect your heart.  General   How to Lower Fat and Cholesterol in Your Diet   Read the labels of the foods you buy from the market to find out how much fat is present. Under 5% of total fat on a label means it is "low fat". Over 20% of total fat on a label means it is high fat.  Eat high fiber foods, like soluble fiber. This type of fiber helps lower cholesterol in the body. Choose oatmeal, fruits (like apples), beans, and nuts to get the most soluble fiber.  Eat foods high in omega-3 fatty acids like gopal seeds, walnuts, salmon, tuna, trout, herring, flaxseed, and soybeans. These foods help keep the heart healthy.  Limit your bad fat and oil intake.  Stay away from butter, stick margarine, shortening, lard, and palm and coconut oil. Pick plant-based spreads instead.  Limit mayonnaise, salad dressings, gravies, and sauces, unless it is made from low-fat ingredients.  Limit chocolate.  Do not eat high-fat processed foods like hot dogs, feliciano, sausage, ham and other luncheon meats high in fat, and some frozen foods. Pick fish, chicken, turkey, and lean meats instead.  Eat more dried beans, lentils, and tofu to get your protein.  Do not eat organ meats, like liver.  Choose nonfat or low-fat milk, yogurt, and cheese.  Use light or fat-free cream cheese and sour cream.  Eat lots of fruits and vegetables.  Pick whole grain breads, cereals, pastas, and rice.  Do not eat snacks that are high in fats like granola, cookies, pies, pastries, doughnuts, and croissants.  Stay away from deep fried foods.  Help When Cooking   Remove the fat portion of meats and the skin from poultry before cooking.  Bake, broil, grill, poach, or roast poultry, fish, and lean meats.  Drain and throw away the fat that drains out of meat as you cook it.  Try to add little or no fat to foods.  Use olive or canola oil for cooking or baking.  Steam your vegetables.  Use herbs or no-oil marinades to flavor " foods.         Who should use this diet?   This diet is for people who are at high risk of getting health problems like heart disease, high blood pressure, diabetes, and others. This diet is also good for all people to follow to keep your heart healthy.  What foods are good to eat?   Foods with good fats are:  Canola, peanut, and olive oil  Safflower, soybean, and corn oil  Walnuts, almonds, cashews, and peanuts  Pumpkin and sunflower seeds  [place] and tuna  Tofu  Soymilk  Avocado  What foods should be limited or avoided?   Stay away from these types of foods that have saturated fats:  Whole fat dairy products like cheese, ice cream, whole milk, and cream  Palm and coconut oils  High-fat meats like beef, lamb, poultry with the skin, feliciano, and sausage  Butter and lard  Stay away from these types of foods that may have trans fat:  Cookies, cakes, candy, doughnuts, baked goods, muffins, pizza dough, and pie crusts that are packaged  Fried foods  Frozen dinners  Chips and crackers  Microwave popcorn  Stick margarine and vegetable shortenings  Helpful tips   To help stay away from saturated fat:  Pick lean cuts of meat  Take the skin off chicken and turkey or pick skinless  Pick low-fat cheese, milk, and ice cream  Use liquid oils when cooking and baking, such as olive oil and canola oil  To help stay away from trans fat:  Look at your labels. Choose foods with 0% trans fat. Read the ingredient list. Avoid foods with partially hydrogenated oil in the ingredient list. This means there is trans fat in the product.  Where can I learn more?   American Heart Association   http://www.heart.org/HEARTORG/Conditions/Cholesterol/PreventionTreatmentofHighCholesterol/Know-Your-Fats_[facility]_305628_Article.jsp   Last Reviewed Date   2021-10-05  Consumer Information Use and Disclaimer   This information is not specific medical advice and does not replace information you receive from your health care provider. This is only a brief summary  of general information. It does NOT include all information about conditions, illnesses, injuries, tests, procedures, treatments, therapies, discharge instructions or life-style choices that may apply to you. You must talk with your health care provider for complete information about your health and treatment options. This information should not be used to decide whether or not to accept your health care providers advice, instructions or recommendations. Only your health care provider has the knowledge and training to provide advice that is right for you.   Copyright   Copyright © 2021 UpToDate, Inc. and its affiliates and/or licensors. All rights reserved.        Risks, benefits, and alternatives discussed with patient, Patient verbalized understanding of discussed plan of care. Asked patient if any further questions, answered no.    Future Appointments   Date Time Provider Department Center   8/30/2022 11:00 AM Ellie Braswell R[facility]   11/21/2022  1:00 PM Yaima Pulliam NP [facility] Pulliam NP

## 2022-08-23 NOTE — PATIENT INSTRUCTIONS
"RTC in 3 months for F/U or sooner if needed.    Keep appts with specialists as scheduled.    Instructed patient to report to nearest ER or call 911 if begins to have difficulty breathing, turning blue, chest pain, B/P < 80/60 or >170/100, palpitations, syncope, extreme weakness, or severe H/A. Patient verbalized understanding.      Patient Education       Diabetes and Diet   The Basics   Written by the doctors and editors at [facility]   Why is diet important in diabetes? -- Diet is important because it is part of diabetes treatment. Many people need to change what they eat and how much they eat to help treat their diabetes. It is important for people to treat their diabetes so that they:  Keep their blood sugar at or near a normal level  Prevent long-term problems, such as heart or kidney problems, that can happen in people with diabetes  Changing your diet can also help treat obesity, high blood pressure, and high cholesterol. These conditions can affect people with diabetes and can lead to future problems, such as heart attacks or strokes.  Who will work with me to change my diet? -- Your doctor or nurse will work with you to make a food plan to change your diet. They might also recommend that you work with a "dietitian." A dietitian is an expert on food and eating.  Do I need to eat at the same times every day? -- When and how often you should eat depends, in part, on the diabetes medicines you take. For example:  People who take about the same amount of insulin at the same time each day (called a "fixed regimen") should eat meals at the same times. This is also true for people who take pills that increase insulin levels, such as sulfonylureas. Eating meals at the same time every day helps prevent low blood sugar.  People who adjust the dose and timing of their insulin each day (called a "flexible regimen") do not always have to eat meals at the same time. That's because they can time their insulin dose for before " "they plan to eat, and also adjust the dose for how much they plan to eat.  People who take medicines that don't usually cause low blood sugar, such as metformin, don't have to eat meals at the same time every day.  What do I need to think about when planning what to eat? -- Our bodies break down the food we eat into small pieces called carbohydrates, proteins, and fats.  When planning what to eat, people with diabetes need to think about:  Carbohydrates (or "carbs") - Carbohydrates, which are sugars that our bodies use for energy, can raise a person's blood sugar level. Your doctor, nurse, or dietitian will tell you how many carbohydrates you should eat at each meal or snack. Foods that have carbohydrates include:  Bread, pasta, and rice  Vegetables and fruits  Dairy foods  Foods and drinks with added sugar  It is best to get your carbohydrates from fruits, vegetables, whole grains, and low-fat milk. It is best to avoid drinks with added sugar, like soda, juices, and sports drinks.   Protein - Your doctor, nurse, or dietitian will tell you how much protein you should eat each day. It is best to eat lean meats, fish, eggs, beans, peas, soy products, nuts, and seeds.  Fats - The type of fat you eat is more important than the amount of fat. "Saturated" and "trans" fats can increase your risk for heart problems, like a heart attack.  Foods that have saturated fats include meat, butter, cheese, and ice cream.  Foods that have trans fats include processed food with "partially hydrogenated oils" on the ingredient list. This may include fried foods, store bought cookies, muffins, pies, and cakes.  "Monounsaturated" and "polyunsaturated" fats are better for you. Foods with these types of fat include fish, avocado, olive oil, and nuts.  Calories - People need to eat a certain amount of calories each day to keep their weight the same. People who are overweight and want to lose weight need to eat fewer calories each day.  Fiber " - Eating foods with a lot of fiber can help control a person's blood sugar level. Foods that have a lot of fiber include apples, green beans, peas, beans, lentils, nuts, oatmeal, and whole grains.  Salt - People who have high blood pressure should not eat foods that contain a lot of salt (also called sodium). People with high blood pressure should also eat healthy foods, such as fruits, vegetables, and low-fat dairy foods.  Alcohol - Having more than 1 drink (for women) or 2 drinks (for men) a day can raise blood sugar levels. Also, drinks that have fruit juice or soda in them can raise blood sugar levels.  What can I do if I need to lose weight? -- If you need to lose weight, you can:  Exercise - Try to get at least 30 minutes of physical activity a day, most days of the week. Even gentle exercise, like walking, is good for your health. Some people with diabetes need to change their medicine dose before they exercise. They might also need to check their blood sugar levels before and after exercising.  Eat fewer calories - Your doctor, nurse, or dietitian can tell you how many calories you should eat each day in order to lose weight.  If you are worried about your weight, size, or shape, talk with your doctor, nurse, or dietitian. They can help you make changes to improve your health.  Can I eat the same foods as my family? -- Yes. You do not need to eat special foods if you have diabetes. You and your family can eat the same foods. Changing your diet is mostly about eating healthy foods and not eating too much.  What are the other parts of diabetes treatment? -- Besides changing your diet, the other parts of diabetes treatment are:  Exercise  Medicines  Some people with diabetes need to learn how to match their diet and exercise with their medicine dose. For example, people who use insulin might need to choose the dose of insulin they give themselves. To choose their dose, they need to think about:  What they plan  to eat at the next meal  How much exercise they plan to do  What their blood sugar level is  If the diet and exercise do not match the medicine dose, a person's blood sugar level can get too low or too high. Blood sugar levels that are too low or too high can cause problems.  All topics are updated as new evidence becomes available and our peer review process is complete.  This topic retrieved from [employer] on: Sep 21, 2021.  Topic 69571 Version 7.0  Release: 29.4.2 - C29.263  © 2021 UpToDate, Inc. and/or its affiliates. All rights reserved.  Consumer Information Use and Disclaimer   This information is not specific medical advice and does not replace information you receive from your health care provider. This is only a brief summary of general information. It does NOT include all information about conditions, illnesses, injuries, tests, procedures, treatments, therapies, discharge instructions or life-style choices that may apply to you. You must talk with your health care provider for complete information about your health and treatment options. This information should not be used to decide whether or not to accept your health care provider's advice, instructions or recommendations. Only your health care provider has the knowledge and training to provide advice that is right for you. The use of this information is governed by the [employer] End User License Agreement, available at [email or web address].The use of [employer] content is governed by the [employer] Terms of Use. ©2021 UpToDate, Inc. All rights reserved.  Copyright   © 2021 UpToDate, Inc. and/or its affiliates. All rights reserved.  Patient Education       Low Cholesterol, Saturated Fat, and Trans Fat Diet   About this topic   Cholesterol, saturated fat, and trans fat are in many foods. These may raise your blood cholesterol levels. If your cholesterol is too high, this can cause health problems in your heart, liver,  "kidneys, and even your eyes. The key to lowering your risk of heart problems is to lower your bad fat intake.  Saturated fats and trans fats are the bad fats. These fats clog your arteries and raise your bad cholesterol. Saturated fats and trans fats are solid fats at room temperature. Saturated fats are animal fats. Trans fats are manmade fats. They add flavor to a lot of packaged foods. Staying away from saturated and trans fats will help your heart.  When you do eat foods with fat, make sure they have the good fats. Monounsaturated and polyunsaturated fats are good fats. These fats help raise your good cholesterol and protect your heart.  General   How to Lower Fat and Cholesterol in Your Diet   Read the labels of the foods you buy from the market to find out how much fat is present. Under 5% of total fat on a label means it is "low fat". Over 20% of total fat on a label means it is high fat.  Eat high fiber foods, like soluble fiber. This type of fiber helps lower cholesterol in the body. Choose oatmeal, fruits (like apples), beans, and nuts to get the most soluble fiber.  Eat foods high in omega-3 fatty acids like gopal seeds, walnuts, salmon, tuna, trout, herring, flaxseed, and soybeans. These foods help keep the heart healthy.  Limit your bad fat and oil intake.  Stay away from butter, stick margarine, shortening, lard, and palm and coconut oil. Pick plant-based spreads instead.  Limit mayonnaise, salad dressings, gravies, and sauces, unless it is made from low-fat ingredients.  Limit chocolate.  Do not eat high-fat processed foods like hot dogs, feliciano, sausage, ham and other luncheon meats high in fat, and some frozen foods. Pick fish, chicken, turkey, and lean meats instead.  Eat more dried beans, lentils, and tofu to get your protein.  Do not eat organ meats, like liver.  Choose nonfat or low-fat milk, yogurt, and cheese.  Use light or fat-free cream cheese and sour cream.  Eat lots of fruits and " vegetables.  Pick whole grain breads, cereals, pastas, and rice.  Do not eat snacks that are high in fats like granola, cookies, pies, pastries, doughnuts, and croissants.  Stay away from deep fried foods.  Help When Cooking   Remove the fat portion of meats and the skin from poultry before cooking.  Bake, broil, grill, poach, or roast poultry, fish, and lean meats.  Drain and throw away the fat that drains out of meat as you cook it.  Try to add little or no fat to foods.  Use olive or canola oil for cooking or baking.  Steam your vegetables.  Use herbs or no-oil marinades to flavor foods.         Who should use this diet?   This diet is for people who are at high risk of getting health problems like heart disease, high blood pressure, diabetes, and others. This diet is also good for all people to follow to keep your heart healthy.  What foods are good to eat?   Foods with good fats are:  Canola, peanut, and olive oil  Safflower, soybean, and corn oil  Walnuts, almonds, cashews, and peanuts  Pumpkin and sunflower seeds  [place] and tuna  Tofu  Soymilk  Avocado  What foods should be limited or avoided?   Stay away from these types of foods that have saturated fats:  Whole fat dairy products like cheese, ice cream, whole milk, and cream  Palm and coconut oils  High-fat meats like beef, lamb, poultry with the skin, feliciano, and sausage  Butter and lard  Stay away from these types of foods that may have trans fat:  Cookies, cakes, candy, doughnuts, baked goods, muffins, pizza dough, and pie crusts that are packaged  Fried foods  Frozen dinners  Chips and crackers  Microwave popcorn  Stick margarine and vegetable shortenings  Helpful tips   To help stay away from saturated fat:  Pick lean cuts of meat  Take the skin off chicken and turkey or pick skinless  Pick low-fat cheese, milk, and ice cream  Use liquid oils when cooking and baking, such as olive oil and canola oil  To help stay away from trans fat:  Look at your  labels. Choose foods with 0% trans fat. Read the ingredient list. Avoid foods with partially hydrogenated oil in the ingredient list. This means there is trans fat in the product.  Where can I learn more?   American Heart Association   http://www.heart.org/HEARTORG/Conditions/Cholesterol/PreventionTreatmentofHighCholesterol/Know-Your-Fats_[facility]_305628_Article.jsp   Last Reviewed Date   2021-10-05  Consumer Information Use and Disclaimer   This information is not specific medical advice and does not replace information you receive from your health care provider. This is only a brief summary of general information. It does NOT include all information about conditions, illnesses, injuries, tests, procedures, treatments, therapies, discharge instructions or life-style choices that may apply to you. You must talk with your health care provider for complete information about your health and treatment options. This information should not be used to decide whether or not to accept your health care providers advice, instructions or recommendations. Only your health care provider has the knowledge and training to provide advice that is right for you.   Copyright   Copyright © 2021 UpToDate, Inc. and its affiliates and/or licensors. All rights reserved.

## 2022-08-23 NOTE — PROGRESS NOTES
Individual Follow-Up Form    8/23/2022    Clinical Status of Patient: Outpatient    Length of Service: 60 minutes    Continuing Medication: yes  Wellbutrin    Other Medications: Nicotine Lozenges     Target Symptoms: Withdrawal and medication side effects. The following were  rated moderate (3) to severe (4) on TCRS:  · Moderate (3): none reported  · Severe (4): none reported    Comments:  Spoke with patient at length in clinic regarding tobacco cessation progress. Patient remains on prescribed tobacco cessation medication 150mg Wellbutrin and 4mg nicotine lozenges without any negative side effects at this time.  Patient decreased to 7-8 cigarettes daily.  Patient will cut down to 6 cigarettes a day this week.  Counselor reviewed strategies, habitual behavior, high risks situations, understanding urges and cravings, stress and relaxation with open discussion and additional interventions, Introduced lapses, relapses, understanding them and analyzing the situation of a lapse, conflict issues that may be linked to a lapse.  Patient stated that she utilizes her nicotine lozenges occasionally.   Counselor suggested that patient set daily smoking limits by limiting the number of cigarettes she has access to daily.  Counselor also suggested that patient utilize jolly ranchers, dum dum lollipops, and toffee candy to help control nicotine cravings.  Counselor suggested that patient exercise or stay active throughout the day to help distract herself from smoking.  Counselor also agreed to decrease her smoking in her car from 3 cigarettes to one cigarette while driving.  Patient appeared to be receptive to the information given.  Counselor will continue to motivate patient to be tobacco free.    Diagnosis: F17.200    Next Visit: 1 week

## 2022-08-25 RX ORDER — BUPROPION HYDROCHLORIDE 150 MG/1
150 TABLET, EXTENDED RELEASE ORAL 2 TIMES DAILY
Qty: 60 TABLET | Refills: 0 | Status: SHIPPED | OUTPATIENT
Start: 2022-08-25 | End: 2022-09-20 | Stop reason: SDUPTHER

## 2022-08-30 ENCOUNTER — CLINICAL SUPPORT (OUTPATIENT)
Dept: SMOKING CESSATION | Facility: CLINIC | Age: 57
End: 2022-08-30
Payer: COMMERCIAL

## 2022-08-30 DIAGNOSIS — F17.200 NICOTINE DEPENDENCE: Primary | ICD-10-CM

## 2022-08-30 PROCEDURE — 90853 GROUP PSYCHOTHERAPY: CPT | Mod: S$GLB,,, | Performed by: GENERAL PRACTICE

## 2022-08-30 PROCEDURE — 90853 PR GROUP PSYCHOTHERAPY: ICD-10-PCS | Mod: S$GLB,,, | Performed by: GENERAL PRACTICE

## 2022-08-30 NOTE — PROGRESS NOTES
Individual Follow-Up Form    8/30/2022    Quit Date: To Be Determined    Clinical Status of Patient: Outpatient    Length of Service: 60 minutes    Continuing Medication: yes  Wellbutrin    Other Medications: Nicotine Lozenges     Target Symptoms: Withdrawal and medication side effects. The following were  rated moderate (3) to severe (4) on TCRS:  Moderate (3): None Reported  Severe (4): None Reported    Comments: Spoke with patient at length in clinic regarding tobacco cessation progress. Patient remains on prescribed tobacco cessation medication 150mg Wellbutrin and 4mg nicotine lozenges without any negative side effects at this time.  Patient decreased to 6 cigarettes daily.  Patient will cut down to 5 cigarettes a day this week.  Counselor reviewed strategies, habitual behavior, high risks situations, understanding urges and cravings, stress and relaxation with open discussion and additional interventions, Introduced lapses, relapses, understanding them and analyzing the situation of a lapse, conflict issues that may be linked to a lapse.  Patient stated that she is preparing to take a trip to visit her son in the .  Patient and counselor discussed her plan to refrain from smoking and address future nicotine cravings.  Counselor confirmed with patient that she has adequate amounts of smoking cessation prescribed medication and cinnamon toothpicks to use during her trip to curve nicotine cravings.  Patient appeared to be receptive to the information given.  Counselor will continue to motivate patient to be tobacco free.    Diagnosis: F17.200    Next Visit: 1 week

## 2022-09-09 ENCOUNTER — TELEPHONE (OUTPATIENT)
Dept: SMOKING CESSATION | Facility: CLINIC | Age: 57
End: 2022-09-09

## 2022-09-09 NOTE — TELEPHONE ENCOUNTER
Spoke with patient regarding her scheduled follow up appointment scheduled for today 9/9/2022 @ 11AM.  Patient requested to reschedule it for 9/13/2022 @ 10AM.

## 2022-09-12 DIAGNOSIS — K21.00 GASTROESOPHAGEAL REFLUX DISEASE WITH ESOPHAGITIS WITHOUT HEMORRHAGE: ICD-10-CM

## 2022-09-12 RX ORDER — OMEPRAZOLE 20 MG/1
20 CAPSULE, DELAYED RELEASE ORAL DAILY
Qty: 30 CAPSULE | Refills: 2 | Status: SHIPPED | OUTPATIENT
Start: 2022-09-12 | End: 2022-12-15

## 2022-09-13 ENCOUNTER — CLINICAL SUPPORT (OUTPATIENT)
Dept: SMOKING CESSATION | Facility: CLINIC | Age: 57
End: 2022-09-13
Payer: COMMERCIAL

## 2022-09-13 DIAGNOSIS — F17.200 NICOTINE DEPENDENCE: Primary | ICD-10-CM

## 2022-09-13 PROCEDURE — 90853 GROUP PSYCHOTHERAPY: CPT | Mod: S$GLB,,, | Performed by: GENERAL PRACTICE

## 2022-09-13 PROCEDURE — 90853 PR GROUP PSYCHOTHERAPY: ICD-10-PCS | Mod: S$GLB,,, | Performed by: GENERAL PRACTICE

## 2022-09-13 NOTE — PROGRESS NOTES
Individual Follow-Up Form    9/13/2022    Quit Date: TBD    Clinical Status of Patient: Outpatient    Length of Service: 60 minutes    Continuing Medication: yes  Wellbutrin    Other Medications: Nicotine Lozenges     Target Symptoms: Withdrawal and medication side effects. The following were  rated moderate (3) to severe (4) on TCRS:  Moderate (3): None Reported  Severe (4): None Reported    Comments: Spoke with patient at length in clinic regarding tobacco cessation progress. Patient remains on prescribed tobacco cessation medication 150mg Wellbutrin and 4mg nicotine lozenges without any negative side effects at this time.  Patient decreased to 4 cigarettes daily.  Patient will cut down to 3 cigarettes a day this week.  Counselor reviewed strategies, habitual behavior, stress, and high risk situations. Introduced stress with addition interventions, SOLVE, relaxation with interventions, nutrition, exercise, weight gain, and the importance of rewarding oneself for accomplishments toward becoming tobacco free. Open discussion of all items with interventions.  Patient stated that she was able to refrain from smoking while traveling with family recently.  Patient used the tools provided to prevent her from smoking.  Patient appeared to be receptive to the information given.  Counselor will continue to motivate patient to be tobacco free.    Diagnosis: F17.200    Next Visit: 1 week

## 2022-09-14 DIAGNOSIS — E11.9 TYPE 2 DIABETES MELLITUS WITHOUT COMPLICATIONS: ICD-10-CM

## 2022-09-14 RX ORDER — FOLIC ACID 1 MG/1
TABLET ORAL
Qty: 30 TABLET | Refills: 5 | Status: SHIPPED | OUTPATIENT
Start: 2022-09-14 | End: 2022-11-21 | Stop reason: SDUPTHER

## 2022-09-19 DIAGNOSIS — E28.39 OTHER PRIMARY OVARIAN FAILURE: ICD-10-CM

## 2022-09-19 DIAGNOSIS — I10 ESSENTIAL (PRIMARY) HYPERTENSION: ICD-10-CM

## 2022-09-20 ENCOUNTER — CLINICAL SUPPORT (OUTPATIENT)
Dept: SMOKING CESSATION | Facility: CLINIC | Age: 57
End: 2022-09-20
Payer: COMMERCIAL

## 2022-09-20 DIAGNOSIS — F17.200 NICOTINE DEPENDENCE: Primary | ICD-10-CM

## 2022-09-20 PROCEDURE — 90853 PR GROUP PSYCHOTHERAPY: ICD-10-PCS | Mod: S$GLB,,, | Performed by: GENERAL PRACTICE

## 2022-09-20 PROCEDURE — 90853 GROUP PSYCHOTHERAPY: CPT | Mod: S$GLB,,, | Performed by: GENERAL PRACTICE

## 2022-09-20 RX ORDER — LOSARTAN POTASSIUM 100 MG/1
TABLET ORAL
Qty: 90 TABLET | Refills: 3 | Status: SHIPPED | OUTPATIENT
Start: 2022-09-20 | End: 2023-08-28 | Stop reason: SDUPTHER

## 2022-09-20 RX ORDER — BUPROPION HYDROCHLORIDE 150 MG/1
150 TABLET, EXTENDED RELEASE ORAL 2 TIMES DAILY
Qty: 60 TABLET | Refills: 0 | Status: SHIPPED | OUTPATIENT
Start: 2022-09-20 | End: 2022-11-04 | Stop reason: SDUPTHER

## 2022-09-20 RX ORDER — ASPIRIN/CALCIUM CARB/MAGNESIUM 325 MG
4 TABLET ORAL
Qty: 288 LOZENGE | Refills: 0 | Status: SHIPPED | OUTPATIENT
Start: 2022-09-20 | End: 2022-11-21

## 2022-09-20 RX ORDER — ESTRADIOL 1 MG/1
TABLET ORAL
Qty: 90 TABLET | Refills: 3 | Status: SHIPPED | OUTPATIENT
Start: 2022-09-20 | End: 2023-08-30 | Stop reason: SDUPTHER

## 2022-09-20 NOTE — PROGRESS NOTES
Individual Follow-Up Form    9/20/2022    Quit Date: TBD    Clinical Status of Patient: Outpatient    Length of Service: 60 minutes    Continuing Medication: yes  Wellbutrin    Additional Medication: Nicotine Lozenges     Target Symptoms: Withdrawal and medication side effects. The following were  rated moderate (3) to severe (4) on TCRS:  Moderate (3): None Reported  Severe (4): None Reported    Comments: Spoke with patient at length in clinic regarding tobacco cessation progress. Patient remains on prescribed tobacco cessation medication 150mg Wellbutin and 4mg nicotine lozenges without any negative side effects at this time.  Patient remains smoking 3 cigarettes daily.  Patient will cut down to 2 cigarettes a day this week.  Counselor reviewed strategies, controlling environment, cues, triggers, new goals set. Introduced high risk situations with preparation interventions, caffeine similarities with withdrawal issues of habit and nicotine, Understanding urges, cravings, stress and relaxation. Open discussion with intervention discussion.  Patient stated that she has noticed an improvement in the number of cigarettes she smokes in her car while driving.  Patient has plans to visit her grandchildren in [place] and does not want to smoke around them.  Counselor submitted a refill for 150mg Wellbutrin and 4mg nicotine lozenges.  Patient appeared to be receptive to the information given.  Counselor will continue to motivate patient to be tobacco free.    Diagnosis: F17.200    Next Visit: 1 week

## 2022-09-23 ENCOUNTER — PATIENT OUTREACH (OUTPATIENT)
Dept: ADMINISTRATIVE | Facility: HOSPITAL | Age: 57
End: 2022-09-23
Payer: MEDICARE

## 2022-09-23 NOTE — LETTER
AUTHORIZATION FOR RELEASE OF   CONFIDENTIAL INFORMATION      We are seeing Anca Grace, date of birth 1965, in the clinic at [facility]. Garrett Pulliam NP is the patient's PCP. Anca Grace has an outstanding lab/procedure at the time we reviewed her chart. In order to help keep her health information updated, she has authorized us to request the following medical record(s):        (  )  MAMMOGRAM                                      ( X )  COLONOSCOPY & PATHOLOGY       (  )  PAP SMEAR                                          (  )  OUTSIDE LAB RESULTS     (  )  DEXA SCAN                                          (  )  EYE EXAM            (  )  FOOT EXAM                                          (  )  ENTIRE RECORD     (  )  OUTSIDE IMMUNIZATIONS                 (  )  _______________         Please fax records to [facility], [phone]     If you have any questions, please contact Jyothi Hussein at [phone]          Patient Name: Anca Grace  : 1965  Patient Phone #: [phone]

## 2022-09-30 ENCOUNTER — TELEPHONE (OUTPATIENT)
Dept: SMOKING CESSATION | Facility: CLINIC | Age: 57
End: 2022-09-30
Payer: MEDICARE

## 2022-09-30 NOTE — TELEPHONE ENCOUNTER
Spoke with patient regarding her missed follow up appointment.  Patient's appointment was rescheduled for 10/3/2022 @ 9:00AM

## 2022-09-30 NOTE — TELEPHONE ENCOUNTER
Attempted to contact patient regarding her missed scheduled follow up appointment.  Patient did not answer.  Counselor left a voice message with contact information.

## 2022-10-03 ENCOUNTER — CLINICAL SUPPORT (OUTPATIENT)
Dept: SMOKING CESSATION | Facility: CLINIC | Age: 57
End: 2022-10-03
Payer: COMMERCIAL

## 2022-10-03 ENCOUNTER — TELEPHONE (OUTPATIENT)
Dept: SMOKING CESSATION | Facility: CLINIC | Age: 57
End: 2022-10-03
Payer: MEDICARE

## 2022-10-03 DIAGNOSIS — F17.200 NICOTINE DEPENDENCE: Primary | ICD-10-CM

## 2022-10-03 PROCEDURE — 90853 GROUP PSYCHOTHERAPY: CPT | Mod: S$GLB,,, | Performed by: GENERAL PRACTICE

## 2022-10-03 PROCEDURE — 90853 PR GROUP PSYCHOTHERAPY: ICD-10-PCS | Mod: S$GLB,,, | Performed by: GENERAL PRACTICE

## 2022-10-03 NOTE — PROGRESS NOTES
Individual Follow-Up Form    10/3/2022    Quit Date: TBD    Clinical Status of Patient: Outpatient    Length of Service: 60 minutes    Continuing Medication: yes  Wellbutrin    Other Medications: Nicotine Lozenges     Target Symptoms: Withdrawal and medication side effects. The following were  rated moderate (3) to severe (4) on TCRS:  Moderate (3): None Reported  Severe (4): None Reported    Comments: Spoke with patient at length in clinic regarding tobacco cessation progress. Patient remains on prescribed tobacco cessation medication 2mg nicotine lozenges and 150mg Wellbutrin without any negative side effects at this time.  Patient decreased to 4 cigarettes daily.  Patient will cut down to 3 cigarettes a day this week.  Reviewed strategies, habitual behavior, high risks situations, understanding urges and cravings, stress and relaxation with open discussion and additional interventions, Introduced lapses, relapses, understanding them and analyzing the situation of a lapse, conflict issues that may be linked to a lapse.  Counselor discussed the 4ds (delay, drink, distract, deep breathing) to address nicotine cravings.  Counselor suggested that patient set daily smoking limits by limiting the number of cigarettes she has access to daily.  Patient appeared to be receptive to the information given.  Counselor will continue to motivate patient to be tobacco free.    Diagnosis: F17.200    Next Visit: 1 week

## 2022-10-06 ENCOUNTER — TELEPHONE (OUTPATIENT)
Dept: PRIMARY CARE CLINIC | Facility: CLINIC | Age: 57
End: 2022-10-06
Payer: MEDICARE

## 2022-10-06 NOTE — TELEPHONE ENCOUNTER
----- Message from Taya Moran sent at 10/6/2022 11:55 AM CDT -----  Regarding: Blood Work  Contact: [place]  Per phone call with patient she would like a copy of he blood work to be sent to Dr Walt Walker.  She goes on Tuesday to see this physician.  Please return call at [phone] (home).    Thanks,  SJ

## 2022-10-10 ENCOUNTER — TELEPHONE (OUTPATIENT)
Dept: SMOKING CESSATION | Facility: CLINIC | Age: 57
End: 2022-10-10

## 2022-10-10 NOTE — TELEPHONE ENCOUNTER
Spoke with patient regarding her scheduled follow up appointment.  Patient requested to reschedule her appointment.  Patient's appointment is rescheduled for 10/14/2022 @ 10am.

## 2022-10-14 ENCOUNTER — CLINICAL SUPPORT (OUTPATIENT)
Dept: SMOKING CESSATION | Facility: CLINIC | Age: 57
End: 2022-10-14
Payer: COMMERCIAL

## 2022-10-14 DIAGNOSIS — F17.200 NICOTINE DEPENDENCE: Primary | ICD-10-CM

## 2022-10-14 PROCEDURE — 90853 PR GROUP PSYCHOTHERAPY: ICD-10-PCS | Mod: S$GLB,,, | Performed by: GENERAL PRACTICE

## 2022-10-14 PROCEDURE — 90853 GROUP PSYCHOTHERAPY: CPT | Mod: S$GLB,,, | Performed by: GENERAL PRACTICE

## 2022-10-14 RX ORDER — NICOTINE 7MG/24HR
1 PATCH, TRANSDERMAL 24 HOURS TRANSDERMAL DAILY
Qty: 28 PATCH | Refills: 0 | Status: SHIPPED | OUTPATIENT
Start: 2022-10-14 | End: 2022-10-28 | Stop reason: SDUPTHER

## 2022-10-14 NOTE — PROGRESS NOTES
Individual Follow-Up Form    10/14/2022    Quit Date: TBD    Clinical Status of Patient: Outpatient    Length of Service: 60 minutes    Continuing Medication: yes  Wellbutrin    Other Medications: Nicotine Patches     Target Symptoms: Withdrawal and medication side effects. The following were  rated moderate (3) to severe (4) on TCRS:  Moderate (3): None Reported  Severe (4): None Reported    Comments: Spoke with patient at length in clinic regarding tobacco cessation progress. Patient remains on prescribed tobacco cessation medication 150mg Wellbutrin without any negative side effects at this time.  Patient remains smoking 3 -4 cigarettes per day.  Patient will cut down to 2 cigarettes a day this week.  Reviewed strategies, habitual behavior, stress, and high risk situations. Introduced stress with addition interventions, SOLVE, relaxation with interventions, nutrition, exercise, weight gain, and the importance of rewarding oneself for accomplishments toward becoming tobacco free. Open discussion of all items with interventions. Counselor submitted a prescription for 7mg patches to address last few cigarettes that patient is struggling with.     Patient appeared to be receptive to the information given.  Counselor will continue to motivate patient to be tobacco free.    Diagnosis: F17.200    Next Visit: 1 week

## 2022-10-18 ENCOUNTER — CLINICAL SUPPORT (OUTPATIENT)
Dept: SMOKING CESSATION | Facility: CLINIC | Age: 57
End: 2022-10-18
Payer: COMMERCIAL

## 2022-10-18 DIAGNOSIS — F17.200 NICOTINE DEPENDENCE: Primary | ICD-10-CM

## 2022-10-18 PROCEDURE — 90853 GROUP PSYCHOTHERAPY: CPT | Mod: S$GLB,,, | Performed by: GENERAL PRACTICE

## 2022-10-18 PROCEDURE — 90853 PR GROUP PSYCHOTHERAPY: ICD-10-PCS | Mod: S$GLB,,, | Performed by: GENERAL PRACTICE

## 2022-10-18 NOTE — PROGRESS NOTES
Individual Follow-Up Form    10/18/2022    Quit Date: TBD    Clinical Status of Patient: Outpatient    Length of Service: 60 minutes    Continuing Medication: yes  Wellbutrin    Other Medications: Nicotine Patch     Target Symptoms: Withdrawal and medication side effects. The following were  rated moderate (3) to severe (4) on TCRS:  Moderate (3): None Reported  Severe (4): None Reported    Comments: Spoke with patient at length in clinic regarding tobacco cessation progress. Patient remains on prescribed tobacco cessation medication 7mg nicotine patch and 150mg Wellbutrin without any negative side effects at this time.  Patient remains smoking 3-4 cigarettes per day.  Patient will cut down to 2 cigarettes a day this week.  Reviewed strategies, habitual behavior, stress, and high risk situations. Introduced stress with addition interventions, SOLVE, relaxation with interventions, nutrition, exercise, weight gain, and the importance of rewarding oneself for accomplishments toward becoming tobacco free. Open discussion of all items with interventions.  Patient stated that she she just started her nicotine patch this morning.  Patient stated that she does not like being outside in cooler weather and that will deter her from smoking.   Patient appeared to be receptive to the information given.  Counselor will continue to motivate patient to be tobacco free.    Diagnosis: F17.200    Next Visit: 1 week

## 2022-10-19 DIAGNOSIS — E87.6 LOW BLOOD POTASSIUM: ICD-10-CM

## 2022-10-20 RX ORDER — POTASSIUM CHLORIDE 20 MEQ/1
TABLET, EXTENDED RELEASE ORAL
Qty: 90 TABLET | Refills: 0 | Status: SHIPPED | OUTPATIENT
Start: 2022-10-20 | End: 2023-01-25

## 2022-10-28 ENCOUNTER — CLINICAL SUPPORT (OUTPATIENT)
Dept: SMOKING CESSATION | Facility: CLINIC | Age: 57
End: 2022-10-28
Payer: COMMERCIAL

## 2022-10-28 DIAGNOSIS — F17.200 NICOTINE DEPENDENCE: Primary | ICD-10-CM

## 2022-10-28 PROCEDURE — 90853 PR GROUP PSYCHOTHERAPY: ICD-10-PCS | Mod: S$GLB,,, | Performed by: GENERAL PRACTICE

## 2022-10-28 PROCEDURE — 90853 GROUP PSYCHOTHERAPY: CPT | Mod: S$GLB,,, | Performed by: GENERAL PRACTICE

## 2022-10-28 RX ORDER — NICOTINE 7MG/24HR
1 PATCH, TRANSDERMAL 24 HOURS TRANSDERMAL DAILY
Qty: 28 PATCH | Refills: 0 | Status: SHIPPED | OUTPATIENT
Start: 2022-10-28 | End: 2023-02-23

## 2022-10-28 NOTE — PROGRESS NOTES
Individual Follow-Up Form    10/28/2022    Quit Date: TBD    Clinical Status of Patient: Outpatient    Length of Service: 60 minutes    Continuing Medication: yes  Wellbutrin    Other Medications: Nicotine Patch     Target Symptoms: Withdrawal and medication side effects. The following were  rated moderate (3) to severe (4) on TCRS:  Moderate (3):None Reported  Severe (4): None Reported    Comments: Spoke with patient at length in clinic regarding tobacco cessation progress. Patient remains on prescribed tobacco cessation medication 150mg Wellbutrin and 7mg nicotine patch without any negative side effects at this time.  Patient remains smoking 4 cigarettes per day.  Patient stated that she had 2 days last week where she smoked 5 cigarettes a day due to increase levels of stress.  Patient will trim down to 3 cigarettes a day this week.   Reviewed strategies, habitual behavior, high risks situations, understanding urges and cravings, stress and relaxation with open discussion and additional interventions, Introduced lapses, relapses, understanding them and analyzing the situation of a lapse, conflict issues that may be linked to a lapse.   Counselor stated that she is going to try using two patches one day to decrease nicotine cravings and improve her chances of becoming tobacco free.  Counselor submitted a refill for 150mg Wellbutrin.  Patient appeared to be receptive to the information given.  Counselor will continue to motivate patient to be tobacco free.    Diagnosis: F17.200    Next Visit: 1 week

## 2022-11-04 ENCOUNTER — CLINICAL SUPPORT (OUTPATIENT)
Dept: SMOKING CESSATION | Facility: CLINIC | Age: 57
End: 2022-11-04
Payer: COMMERCIAL

## 2022-11-04 DIAGNOSIS — F17.200 NICOTINE DEPENDENCE: ICD-10-CM

## 2022-11-04 PROCEDURE — 99407 PR TOBACCO USE CESSATION INTENSIVE >10 MINUTES: ICD-10-PCS | Mod: S$GLB,,, | Performed by: GENERAL PRACTICE

## 2022-11-04 PROCEDURE — 99407 BEHAV CHNG SMOKING > 10 MIN: CPT | Mod: S$GLB,,, | Performed by: GENERAL PRACTICE

## 2022-11-04 RX ORDER — BUPROPION HYDROCHLORIDE 150 MG/1
150 TABLET, EXTENDED RELEASE ORAL 2 TIMES DAILY
Qty: 60 TABLET | Refills: 0 | Status: SHIPPED | OUTPATIENT
Start: 2022-11-04 | End: 2023-02-23

## 2022-11-17 DIAGNOSIS — J30.2 SEASONAL ALLERGIES: ICD-10-CM

## 2022-11-17 DIAGNOSIS — J43.8 OTHER EMPHYSEMA: ICD-10-CM

## 2022-11-17 RX ORDER — IPRATROPIUM BROMIDE AND ALBUTEROL 20; 100 UG/1; UG/1
SPRAY, METERED RESPIRATORY (INHALATION)
Qty: 4 G | Refills: 1 | Status: SHIPPED | OUTPATIENT
Start: 2022-11-17 | End: 2023-02-21

## 2022-11-17 RX ORDER — AZELASTINE 1 MG/ML
SPRAY, METERED NASAL
Qty: 30 ML | Refills: 1 | Status: SHIPPED | OUTPATIENT
Start: 2022-11-17 | End: 2024-01-22 | Stop reason: SDUPTHER

## 2022-11-19 LAB
ALBUMIN SERPL-MCNC: 3.9 G/DL (ref 3.6–5.1)
ALBUMIN/GLOB SERPL: 1.3 (CALC) (ref 1–2.5)
ALP SERPL-CCNC: 113 U/L (ref 37–153)
ALT SERPL-CCNC: 15 U/L (ref 6–29)
AST SERPL-CCNC: 13 U/L (ref 10–35)
BILIRUB SERPL-MCNC: 0.5 MG/DL (ref 0.2–1.2)
BUN SERPL-MCNC: 22 MG/DL (ref 7–25)
BUN/CREAT SERPL: 20 (CALC) (ref 6–22)
CALCIUM SERPL-MCNC: 9.4 MG/DL (ref 8.6–10.4)
CHLORIDE SERPL-SCNC: 100 MMOL/L (ref 98–110)
CO2 SERPL-SCNC: 23 MMOL/L (ref 20–32)
CREAT SERPL-MCNC: 1.08 MG/DL (ref 0.5–1.03)
EGFR: 60 ML/MIN/1.73M2
GLOBULIN SER CALC-MCNC: 2.9 G/DL (CALC) (ref 1.9–3.7)
GLUCOSE SERPL-MCNC: 130 MG/DL (ref 65–99)
HBA1C MFR BLD: 7 % OF TOTAL HGB
POTASSIUM SERPL-SCNC: 4.7 MMOL/L (ref 3.5–5.3)
PROT SERPL-MCNC: 6.8 G/DL (ref 6.1–8.1)
SODIUM SERPL-SCNC: 135 MMOL/L (ref 135–146)
TSH SERPL-ACNC: 3.16 MIU/L (ref 0.4–4.5)

## 2022-11-21 ENCOUNTER — OFFICE VISIT (OUTPATIENT)
Dept: PRIMARY CARE CLINIC | Facility: CLINIC | Age: 57
End: 2022-11-21
Payer: MEDICARE

## 2022-11-21 VITALS
WEIGHT: 264.38 LBS | OXYGEN SATURATION: 95 % | HEART RATE: 91 BPM | BODY MASS INDEX: 44.05 KG/M2 | DIASTOLIC BLOOD PRESSURE: 84 MMHG | HEIGHT: 65 IN | SYSTOLIC BLOOD PRESSURE: 137 MMHG

## 2022-11-21 DIAGNOSIS — E53.8 FOLATE DEFICIENCY: ICD-10-CM

## 2022-11-21 DIAGNOSIS — E11.49 TYPE 2 DIABETES MELLITUS WITH OTHER NEUROLOGIC COMPLICATION, WITH LONG-TERM CURRENT USE OF INSULIN: Chronic | ICD-10-CM

## 2022-11-21 DIAGNOSIS — R05.1 ACUTE COUGH: ICD-10-CM

## 2022-11-21 DIAGNOSIS — E66.01 MORBID OBESITY WITH BMI OF 40.0-44.9, ADULT: ICD-10-CM

## 2022-11-21 DIAGNOSIS — E78.49 OTHER HYPERLIPIDEMIA: ICD-10-CM

## 2022-11-21 DIAGNOSIS — I10 BENIGN ESSENTIAL HTN: Primary | ICD-10-CM

## 2022-11-21 DIAGNOSIS — Z79.4 TYPE 2 DIABETES MELLITUS WITH OTHER NEUROLOGIC COMPLICATION, WITH LONG-TERM CURRENT USE OF INSULIN: Chronic | ICD-10-CM

## 2022-11-21 DIAGNOSIS — R06.2 WHEEZING: ICD-10-CM

## 2022-11-21 DIAGNOSIS — E03.8 OTHER SPECIFIED HYPOTHYROIDISM: ICD-10-CM

## 2022-11-21 DIAGNOSIS — J06.9 ACUTE UPPER RESPIRATORY INFECTION: ICD-10-CM

## 2022-11-21 DIAGNOSIS — R06.02 SOB (SHORTNESS OF BREATH): ICD-10-CM

## 2022-11-21 DIAGNOSIS — E55.9 VITAMIN D DEFICIENCY: ICD-10-CM

## 2022-11-21 PROCEDURE — 3075F SYST BP GE 130 - 139MM HG: CPT | Mod: CPTII,S$GLB,, | Performed by: NURSE PRACTITIONER

## 2022-11-21 PROCEDURE — 1159F PR MEDICATION LIST DOCUMENTED IN MEDICAL RECORD: ICD-10-PCS | Mod: CPTII,S$GLB,, | Performed by: NURSE PRACTITIONER

## 2022-11-21 PROCEDURE — 3008F PR BODY MASS INDEX (BMI) DOCUMENTED: ICD-10-PCS | Mod: CPTII,S$GLB,, | Performed by: NURSE PRACTITIONER

## 2022-11-21 PROCEDURE — 1159F MED LIST DOCD IN RCRD: CPT | Mod: CPTII,S$GLB,, | Performed by: NURSE PRACTITIONER

## 2022-11-21 PROCEDURE — 3051F HG A1C>EQUAL 7.0%<8.0%: CPT | Mod: CPTII,S$GLB,, | Performed by: NURSE PRACTITIONER

## 2022-11-21 PROCEDURE — 3079F DIAST BP 80-89 MM HG: CPT | Mod: CPTII,S$GLB,, | Performed by: NURSE PRACTITIONER

## 2022-11-21 PROCEDURE — 1160F PR REVIEW ALL MEDS BY PRESCRIBER/CLIN PHARMACIST DOCUMENTED: ICD-10-PCS | Mod: CPTII,S$GLB,, | Performed by: NURSE PRACTITIONER

## 2022-11-21 PROCEDURE — 99214 PR OFFICE/OUTPT VISIT, EST, LEVL IV, 30-39 MIN: ICD-10-PCS | Mod: S$GLB,,, | Performed by: NURSE PRACTITIONER

## 2022-11-21 PROCEDURE — 3075F PR MOST RECENT SYSTOLIC BLOOD PRESS GE 130-139MM HG: ICD-10-PCS | Mod: CPTII,S$GLB,, | Performed by: NURSE PRACTITIONER

## 2022-11-21 PROCEDURE — 4010F ACE/ARB THERAPY RXD/TAKEN: CPT | Mod: CPTII,S$GLB,, | Performed by: NURSE PRACTITIONER

## 2022-11-21 PROCEDURE — 3008F BODY MASS INDEX DOCD: CPT | Mod: CPTII,S$GLB,, | Performed by: NURSE PRACTITIONER

## 2022-11-21 PROCEDURE — 3051F PR MOST RECENT HEMOGLOBIN A1C LEVEL 7.0 - < 8.0%: ICD-10-PCS | Mod: CPTII,S$GLB,, | Performed by: NURSE PRACTITIONER

## 2022-11-21 PROCEDURE — 4010F PR ACE/ARB THEARPY RXD/TAKEN: ICD-10-PCS | Mod: CPTII,S$GLB,, | Performed by: NURSE PRACTITIONER

## 2022-11-21 PROCEDURE — 1160F RVW MEDS BY RX/DR IN RCRD: CPT | Mod: CPTII,S$GLB,, | Performed by: NURSE PRACTITIONER

## 2022-11-21 PROCEDURE — 99214 OFFICE O/P EST MOD 30 MIN: CPT | Mod: S$GLB,,, | Performed by: NURSE PRACTITIONER

## 2022-11-21 PROCEDURE — 3079F PR MOST RECENT DIASTOLIC BLOOD PRESSURE 80-89 MM HG: ICD-10-PCS | Mod: CPTII,S$GLB,, | Performed by: NURSE PRACTITIONER

## 2022-11-21 RX ORDER — LEVOTHYROXINE SODIUM 88 UG/1
88 TABLET ORAL
Qty: 90 TABLET | Refills: 1 | Status: SHIPPED | OUTPATIENT
Start: 2022-11-21 | End: 2023-08-30 | Stop reason: SDUPTHER

## 2022-11-21 RX ORDER — BENZONATATE 100 MG/1
100 CAPSULE ORAL 3 TIMES DAILY PRN
Qty: 30 CAPSULE | Refills: 0 | Status: SHIPPED | OUTPATIENT
Start: 2022-11-21 | End: 2022-12-01

## 2022-11-21 RX ORDER — ASPIRIN 325 MG
TABLET, DELAYED RELEASE (ENTERIC COATED) ORAL
Qty: 12 CAPSULE | Refills: 0 | Status: SHIPPED | OUTPATIENT
Start: 2022-11-21 | End: 2023-01-30

## 2022-11-21 RX ORDER — ALBUTEROL SULFATE 90 UG/1
AEROSOL, METERED RESPIRATORY (INHALATION)
Qty: 18 G | Refills: 2 | Status: SHIPPED | OUTPATIENT
Start: 2022-11-21 | End: 2023-03-06

## 2022-11-21 RX ORDER — AZITHROMYCIN 250 MG/1
TABLET, FILM COATED ORAL
Qty: 6 TABLET | Refills: 0 | Status: SHIPPED | OUTPATIENT
Start: 2022-11-21 | End: 2023-02-21

## 2022-11-21 RX ORDER — TIRZEPATIDE 12.5 MG/.5ML
INJECTION, SOLUTION SUBCUTANEOUS
COMMUNITY
Start: 2022-11-17 | End: 2023-02-23

## 2022-11-21 RX ORDER — FOLIC ACID 1 MG/1
TABLET ORAL
Qty: 90 TABLET | Refills: 3 | Status: SHIPPED | OUTPATIENT
Start: 2022-11-21 | End: 2024-01-29 | Stop reason: SDUPTHER

## 2022-11-21 NOTE — PATIENT INSTRUCTIONS
RTC in 3 months for F/U or sooner if needed.    Keep appts with specialists as scheduled.    Instructed patient to report to nearest ER or call 911 if begins to have difficulty breathing, turning blue, chest pain, B/P < 80/60 or >170/100, palpitations, syncope, extreme weakness, or severe H/A. Patient verbalized understanding.      Patient Education       Bacterial Upper Respiratory Infection, Adult   About this topic   Germs cause this health problem. You have signs in your nose, windpipe, voice box or larynx, throat, ears, or lungs that last for days or weeks. It often spreads from a person who is sick to some other person from close contact. This health problem may include:  Sore throat. This is pharyngitis.  Swelling of the lining of the nose. This is rhinitis.  Swelling of the sinuses with pain in the face, forehead, or upper teeth. This is sinusitis.  Swelling of the nose and throat. This is nasopharyngitis.  Swelling of the upper part of the voice box. This is epiglottitis.  Swelling of the voice box. This is laryngitis.  Cough  What are the causes?   The main cause is an infection by certain germs.  What can make this more likely to happen?   Cold or winter season  Low immune system  Close contact with someone who has an upper respiratory infection  Allergies or asthma  Working in a school or day care center  What are the main signs?   Cough or sneezing  Sore throat  Runny or stuffy nose  Ear pain  Fever  Headache  Muscle pain  Tired  Weakness  How does the doctor diagnose this health problem?   Your doctor will do an exam and ask about your history. Your doctor will check your nose, throat, and lungs. The doctor may order:  Lab tests  Throat swab  Chest x-ray  CT or MRI scan  How does the doctor treat this health problem?   Your doctor may give you drugs to treat or prevent infection. You may also be given other drugs based on your condition. Talk to your doctor about what drugs you need to take.  What  lifestyle changes are needed?   You need to rest while you are getting better. If your throat is sore, don't talk too much. This will rest your voice and throat. Drink plenty of fluids to stay hydrated.  What drugs may be needed?   The doctor may order drugs to:  Help with pain and swelling  Fight an infection  Dry up a stuffy nose  Stop wheezing  Control coughing  What can be done to prevent this health problem?   Wash your hands often with soap and water for at least 20 seconds, especially after coughing or sneezing. Alcohol-based hand sanitizers also work to kill the virus.  If you are sick, cover your mouth and nose with tissue when you cough or sneeze. You can also cough into your elbow. Throw away tissues in the trash and wash your hands after touching used tissues.  Clean commonly handled things like door handles, remotes, toys, and phones. Wipe them with a disinfectant.  Do not get too close (kissing, hugging) to people who are sick.  Do not share food, drinks, towels, or hankies with anyone who is sick.  Stay away from crowded places.  Where can I learn more?   American Academy of Family Physicians  https://familydoctor.org/antibiotic-resistance/   American Academy of Family Physicians  https://familydoctor.org/condition/colds-and-the-flu/   Centers for Disease Control and Prevention  http://www.cdc.gov/getsmart/antibiotic-use/URI/index.html   NHS Choices  http://www.nhs.uk/conditions/Respiratory-tract-infection/Pages/Introduction.aspx   Last Reviewed Date   2020-01-24  Consumer Information Use and Disclaimer   This information is not specific medical advice and does not replace information you receive from your health care provider. This is only a brief summary of general information. It does NOT include all information about conditions, illnesses, injuries, tests, procedures, treatments, therapies, discharge instructions or life-style choices that may apply to you. You must talk with your health care  "provider for complete information about your health and treatment options. This information should not be used to decide whether or not to accept your health care providers advice, instructions or recommendations. Only your health care provider has the knowledge and training to provide advice that is right for you.  Copyright   Copyright © 2021 UpToDate, Inc. and its affiliates and/or licensors. All rights reserved.    Patient Education       Low Cholesterol, Saturated Fat, and Trans Fat Diet   About this topic   Cholesterol, saturated fat, and trans fat are in many foods. These may raise your blood cholesterol levels. If your cholesterol is too high, this can cause health problems in your heart, liver, kidneys, and even your eyes. The key to lowering your risk of heart problems is to lower your bad fat intake.  Saturated fats and trans fats are the bad fats. These fats clog your arteries and raise your bad cholesterol. Saturated fats and trans fats are solid fats at room temperature. Saturated fats are animal fats. Trans fats are manmade fats. They add flavor to a lot of packaged foods. Staying away from saturated and trans fats will help your heart.  When you do eat foods with fat, make sure they have the good fats. Monounsaturated and polyunsaturated fats are good fats. These fats help raise your good cholesterol and protect your heart.  General   How to Lower Fat and Cholesterol in Your Diet   Read the labels of the foods you buy from the market to find out how much fat is present. Under 5% of total fat on a label means it is "low fat". Over 20% of total fat on a label means it is high fat.  Eat high fiber foods, like soluble fiber. This type of fiber helps lower cholesterol in the body. Choose oatmeal, fruits (like apples), beans, and nuts to get the most soluble fiber.  Eat foods high in omega-3 fatty acids like gopal seeds, walnuts, salmon, tuna, trout, herring, flaxseed, and soybeans. These foods help keep " the heart healthy.  Limit your bad fat and oil intake.  Stay away from butter, stick margarine, shortening, lard, and palm and coconut oil. Pick plant-based spreads instead.  Limit mayonnaise, salad dressings, gravies, and sauces, unless it is made from low-fat ingredients.  Limit chocolate.  Do not eat high-fat processed foods like hot dogs, feliciano, sausage, ham and other luncheon meats high in fat, and some frozen foods. Pick fish, chicken, turkey, and lean meats instead.  Eat more dried beans, lentils, and tofu to get your protein.  Do not eat organ meats, like liver.  Choose nonfat or low-fat milk, yogurt, and cheese.  Use light or fat-free cream cheese and sour cream.  Eat lots of fruits and vegetables.  Pick whole grain breads, cereals, pastas, and rice.  Do not eat snacks that are high in fats like granola, cookies, pies, pastries, doughnuts, and croissants.  Stay away from deep fried foods.  Help When Cooking   Remove the fat portion of meats and the skin from poultry before cooking.  Bake, broil, grill, poach, or roast poultry, fish, and lean meats.  Drain and throw away the fat that drains out of meat as you cook it.  Try to add little or no fat to foods.  Use olive or canola oil for cooking or baking.  Steam your vegetables.  Use herbs or no-oil marinades to flavor foods.         Who should use this diet?   This diet is for people who are at high risk of getting health problems like heart disease, high blood pressure, diabetes, and others. This diet is also good for all people to follow to keep your heart healthy.  What foods are good to eat?   Foods with good fats are:  Canola, peanut, and olive oil  Safflower, soybean, and corn oil  Walnuts, almonds, cashews, and peanuts  Pumpkin and sunflower seeds  [place] and tuna  Tofu  Soymilk  Avocado  What foods should be limited or avoided?   Stay away from these types of foods that have saturated fats:  Whole fat dairy products like cheese, ice cream, whole milk,  and cream  Palm and coconut oils  High-fat meats like beef, lamb, poultry with the skin, feliciano, and sausage  Butter and lard  Stay away from these types of foods that may have trans fat:  Cookies, cakes, candy, doughnuts, baked goods, muffins, pizza dough, and pie crusts that are packaged  Fried foods  Frozen dinners  Chips and crackers  Microwave popcorn  Stick margarine and vegetable shortenings  Helpful tips   To help stay away from saturated fat:  Pick lean cuts of meat  Take the skin off chicken and turkey or pick skinless  Pick low-fat cheese, milk, and ice cream  Use liquid oils when cooking and baking, such as olive oil and canola oil  To help stay away from trans fat:  Look at your labels. Choose foods with 0% trans fat. Read the ingredient list. Avoid foods with partially hydrogenated oil in the ingredient list. This means there is trans fat in the product.  Where can I learn more?   American Heart Association   http://www.heart.org/HEARTORG/Conditions/Cholesterol/PreventionTreatmentofHighCholesterol/Know-Your-Fats_[facility]_305628_Article.jsp   Last Reviewed Date   2021-10-05  Consumer Information Use and Disclaimer   This information is not specific medical advice and does not replace information you receive from your health care provider. This is only a brief summary of general information. It does NOT include all information about conditions, illnesses, injuries, tests, procedures, treatments, therapies, discharge instructions or life-style choices that may apply to you. You must talk with your health care provider for complete information about your health and treatment options. This information should not be used to decide whether or not to accept your health care providers advice, instructions or recommendations. Only your health care provider has the knowledge and training to provide advice that is right for you.   Copyright   Copyright © 2021 Up[employer]Date, [employer]. and its affiliates and/or licensors. All  rights reserved.

## 2022-11-21 NOTE — PROGRESS NOTES
Subjective:       Patient ID: Anca Grace is a 57 y.o. female.    Chief Complaint: Follow-up    HPI: Anca is a 57 y.o. female who presents for F/U.    C/O chest and nasal congestion along with cough with clear sputum. Denies fever or body aches.    Cholesterol normal on recent labs. Kidney function also normal. HGBA1C was 7.0 and fasting sugar is 131.     DM II -  Chronic and uncontrolled on meds, b/p averaging < 130/80, denies s/e or a/r. Followed by [facility] for Endocrinology for her DM Type 2 and Hypothyroidism. Still takes Toujeo 160 units daily, Mounjaro weekly but was increased to 12.5 mg and takes Xigduo daily. Her blood sugars run between .  Has Neuropathy and she takes Lyrica for it which is prescribed by Orthopedic.    Hyperlipidemia - controlled with a STATIN. Denies se/ar. Say she has been trying to eat healthier.    Hypothyroidism - takes levothyroxine 75 mcg daily. TSH was 3.14 in March and she has still been fatigued on most days. Denies se/ar to medication.    Followed by Dr. Angel in [place]. Had a carpal tunnel surgery, scope to left knee last August and some of her bone was scraped for her arthritis. Follows up monthly for muscle relaxer injections. Elavil increased to 50 mg daily and still on Lyrica as well. Also followed by Dr. Gallegos for lower back pain and he does back injections.    Has cut down on smoking from 1 ppd to 6-7 cigarettes daily and has smoked for 15 years. Still doing smoking cessation and is now taking wellbutrin. It is working well for her.     Mammogram done last year in [place] and was normal.    Had a Colonoscopy done at [facility] which a polyp was found, repeat in 5 years.    History of Hysterectomy in 2012.    UTD with COVID and FLU vaccines.              Past Medical History:   Diagnosis Date    Diabetes mellitus, type 2     GERD (gastroesophageal reflux disease)     Hyperlipidemia     Hypertension        Past Surgical History:   Procedure Laterality  Date    CARPAL TUNNEL RELEASE      HYSTERECTOMY         Family History   Problem Relation Age of Onset    Thyroiditis Mother     Heart disease Father     Graves' disease Sister     Heart disease Maternal Grandfather     Cancer Paternal Grandfather        Social History     Tobacco Use    Smoking status: Every Day     Types: Cigarettes    Smokeless tobacco: Never   Substance Use Topics    Alcohol use: Never    Drug use: Never       Patient Active Problem List   Diagnosis    Degenerative disc disease, cervical    Degenerative disc disease, lumbar    Neck pain    Bilateral arm pain    Low back pain radiating to right leg    Morbid obesity with BMI of 40.0-44.9, adult    Hyperlipidemia    Benign essential HTN       Immunization History   Administered Date(s) Administered    COVID-19, MRNA, LN-S, PF (MODERNA FULL 0.5 ML DOSE) 10/30/2021, 11/27/2021    DTaP 1966, 01/18/1967, 03/08/1967, 04/10/1968, 03/03/1971, 08/01/1977    IPV 1966, 01/18/1967, 03/08/1967, 04/10/1968, 04/07/1978    Influenza - Quadrivalent - PF *Preferred* (6 months and older) 10/14/2021    Measles 11/07/1967    Mumps 11/13/1968    Rubella 10/21/1970    Tdap 04/13/1988, 08/06/2004           Review of Systems   Constitutional:  Negative for activity change, appetite change, chills, diaphoresis, fatigue and fever.   HENT:  Positive for congestion and rhinorrhea. Negative for ear discharge, ear pain, postnasal drip, sinus pressure, sinus pain, sneezing, sore throat, tinnitus and trouble swallowing.    Eyes:  Negative for visual disturbance.   Respiratory:  Positive for cough. Negative for chest tightness, shortness of breath and wheezing.    Cardiovascular:  Negative for chest pain, palpitations and leg swelling.   Gastrointestinal:  Negative for abdominal distention, abdominal pain, blood in stool, constipation, diarrhea, nausea and vomiting.   Endocrine: Negative for cold intolerance, heat intolerance, polydipsia, polyphagia and polyuria.  "  Genitourinary:  Positive for urgency and vaginal discharge. Negative for decreased urine volume, dysuria, frequency, hematuria, pelvic pain, vaginal bleeding and vaginal pain.   Musculoskeletal:  Positive for arthralgias (knees). Negative for back pain, gait problem and neck pain.   Skin:  Negative for color change and rash.   Neurological:  Negative for dizziness, syncope, weakness, light-headedness, numbness and headaches.   Psychiatric/Behavioral:  Negative for behavioral problems, confusion, dysphoric mood and suicidal ideas. The patient is not nervous/anxious.    Objective:     Vitals:    11/21/22 1311   BP: 137/84   BP Location: Right arm   Patient Position: Sitting   BP Method: Medium (Automatic)   Pulse: 91   SpO2: 95%   Weight: 119.9 kg (264 lb 6.4 oz)   Height: 5' 5" (1.651 m)         Physical Exam  Vitals and nursing note reviewed.   Constitutional:       General: She is not in acute distress.     Appearance: Normal appearance. She is not ill-appearing or diaphoretic.   HENT:      Head: Normocephalic and atraumatic.      Nose: Congestion and rhinorrhea present.      Right Sinus: No maxillary sinus tenderness or frontal sinus tenderness.      Left Sinus: No maxillary sinus tenderness or frontal sinus tenderness.      Mouth/Throat:      Mouth: Mucous membranes are moist.      Pharynx: Oropharynx is clear. Posterior oropharyngeal erythema present. No oropharyngeal exudate.      Tonsils: No tonsillar exudate.   Eyes:      Extraocular Movements: Extraocular movements intact.      Conjunctiva/sclera: Conjunctivae normal.      Pupils: Pupils are equal, round, and reactive to light.   Neck:      Thyroid: No thyromegaly or thyroid tenderness.   Cardiovascular:      Rate and Rhythm: Normal rate and regular rhythm.      Pulses: Normal pulses.      Heart sounds: Normal heart sounds.   Pulmonary:      Effort: Pulmonary effort is normal. No respiratory distress.      Breath sounds: Normal breath sounds. No decreased " air movement. No wheezing, rhonchi or rales.   Chest:      Chest wall: No tenderness.   Abdominal:      General: There is no distension.      Tenderness: There is no abdominal tenderness.   Musculoskeletal:         General: No tenderness. Normal range of motion.      Cervical back: Normal range of motion and neck supple.      Right lower leg: No edema.      Left lower leg: No edema.   Lymphadenopathy:      Cervical: No cervical adenopathy.   Skin:     General: Skin is warm and dry.      Findings: No erythema or rash.   Neurological:      Mental Status: She is alert and oriented to person, place, and time.   Psychiatric:         Mood and Affect: Mood and affect normal.         Behavior: Behavior normal. Behavior is cooperative.         Thought Content: Thought content normal. Thought content does not include homicidal or suicidal ideation. Thought content does not include homicidal or suicidal plan.         Judgment: Judgment normal.       Patient Outreach on 09/23/2022   Component Date Value Ref Range Status    CRC Recommendation External 08/07/2018 Repeat colonoscopy in 3 years   Final   Office Visit on 08/23/2022   Component Date Value Ref Range Status    Hemoglobin A1C 11/18/2022 7.0 (H)  <5.7 % of total Hgb Final    Glucose 11/18/2022 130 (H)  65 - 99 mg/dL Final    BUN 11/18/2022 22  7 - 25 mg/dL Final    Creatinine 11/18/2022 1.08 (H)  0.50 - 1.03 mg/dL Final    eGFR 11/18/2022 60  > OR = 60 mL/min/1.73m2 Final    BUN/Creatinine Ratio 11/18/2022 20  6 - 22 (calc) Final    Sodium 11/18/2022 135  135 - 146 mmol/L Final    Potassium 11/18/2022 4.7  3.5 - 5.3 mmol/L Final    Chloride 11/18/2022 100  98 - 110 mmol/L Final    CO2 11/18/2022 23  20 - 32 mmol/L Final    Calcium 11/18/2022 9.4  8.6 - 10.4 mg/dL Final    Total Protein 11/18/2022 6.8  6.1 - 8.1 g/dL Final    Albumin 11/18/2022 3.9  3.6 - 5.1 g/dL Final    Globulin, Total 11/18/2022 2.9  1.9 - 3.7 g/dL (calc) Final    Albumin/Globulin Ratio 11/18/2022 1.3   1.0 - 2.5 (calc) Final    Total Bilirubin 11/18/2022 0.5  0.2 - 1.2 mg/dL Final    Alkaline Phosphatase 11/18/2022 113  37 - 153 U/L Final    AST 11/18/2022 13  10 - 35 U/L Final    ALT 11/18/2022 15  6 - 29 U/L Final    TSH w/reflex to FT4 11/18/2022 3.16  0.40 - 4.50 mIU/L Final   Orders Only on 08/19/2022   Component Date Value Ref Range Status    Hemoglobin A1C 08/19/2022 6.5 (H)  <5.7 % of total Hgb Final    WBC 08/19/2022 11.4 (H)  3.8 - 10.8 Thousand/uL Final    RBC 08/19/2022 4.31  3.80 - 5.10 Million/uL Final    Hemoglobin 08/19/2022 12.5  11.7 - 15.5 g/dL Final    Hematocrit 08/19/2022 37.6  35.0 - 45.0 % Final    MCV 08/19/2022 87.2  80.0 - 100.0 fL Final    MCH 08/19/2022 29.0  27.0 - 33.0 pg Final    MCHC 08/19/2022 33.2  32.0 - 36.0 g/dL Final    RDW 08/19/2022 16.5 (H)  11.0 - 15.0 % Final    Platelets 08/19/2022 254  140 - 400 Thousand/uL Final    MPV 08/19/2022 11.2  7.5 - 12.5 fL Final    Neutrophils, Abs 08/19/2022 7,649  1,500 - 7,800 cells/uL Final    Lymph # 08/19/2022 1,972  850 - 3,900 cells/uL Final    Mono # 08/19/2022 1,368 (H)  200 - 950 cells/uL Final    Eos # 08/19/2022 319  15 - 500 cells/uL Final    Baso # 08/19/2022 91  0 - 200 cells/uL Final    Neutrophils Relative 08/19/2022 67.1  % Final    Lymph % 08/19/2022 17.3  % Final    Mono % 08/19/2022 12.0  % Final    Eosinophil % 08/19/2022 2.8  % Final    Basophil % 08/19/2022 0.8  % Final    Glucose 08/19/2022 213 (H)  65 - 99 mg/dL Final    BUN 08/19/2022 23  7 - 25 mg/dL Final    Creatinine 08/19/2022 1.35 (H)  0.50 - 1.03 mg/dL Final    eGFR 08/19/2022 46 (L)  > OR = 60 mL/min/1.73m2 Final    BUN/Creatinine Ratio 08/19/2022 17  6 - 22 (calc) Final    Sodium 08/19/2022 141  135 - 146 mmol/L Final    Potassium 08/19/2022 4.6  3.5 - 5.3 mmol/L Final    Chloride 08/19/2022 103  98 - 110 mmol/L Final    CO2 08/19/2022 28  20 - 32 mmol/L Final    Calcium 08/19/2022 9.1  8.6 - 10.4 mg/dL Final    Total Protein 08/19/2022 6.3  6.1 - 8.1  g/dL Final    Albumin 08/19/2022 3.9  3.6 - 5.1 g/dL Final    Globulin, Total 08/19/2022 2.4  1.9 - 3.7 g/dL (calc) Final    Albumin/Globulin Ratio 08/19/2022 1.6  1.0 - 2.5 (calc) Final    Total Bilirubin 08/19/2022 0.5  0.2 - 1.2 mg/dL Final    Alkaline Phosphatase 08/19/2022 98  37 - 153 U/L Final    AST 08/19/2022 14  10 - 35 U/L Final    ALT 08/19/2022 27  6 - 29 U/L Final    Cholesterol 08/19/2022 161  <200 mg/dL Final    HDL 08/19/2022 50  > OR = 50 mg/dL Final    Triglycerides 08/19/2022 150 (H)  <150 mg/dL Final    LDL Cholesterol 08/19/2022 86  mg/dL (calc) Final    HDL/Cholesterol Ratio 08/19/2022 3.2  <5.0 (calc) Final    Non HDL Chol. (LDL+VLDL) 08/19/2022 111  <130 mg/dL (calc) Final    Color, UA 08/19/2022 YELLOW  YELLOW Final    Appearance, UA 08/19/2022 CLEAR  CLEAR Final    Specific Gravity, UA 08/19/2022 1.021  1.001 - 1.035 Final    pH, UA 08/19/2022 7.5  5.0 - 8.0 Final    Glucose, UA 08/19/2022 3+ (A)  NEGATIVE Final    Bilirubin, UA 08/19/2022 NEGATIVE  NEGATIVE Final    Ketones, UA 08/19/2022 NEGATIVE  NEGATIVE Final    Occult Blood UA 08/19/2022 NEGATIVE  NEGATIVE Final    Protein, UA 08/19/2022 NEGATIVE  NEGATIVE Final    Nitrite, UA 08/19/2022 NEGATIVE  NEGATIVE Final    Leukocytes, UA 08/19/2022 1+ (A)  NEGATIVE Final    WBC Casts, UA 08/19/2022 20-40 (A)  < OR = 5 /HPF Final    RBC Casts, UA 08/19/2022 NONE SEEN  < OR = 2 /HPF Final    Squam Epithel, UA 08/19/2022 0-5  < OR = 5 /HPF Final    Bacteria, UA 08/19/2022 FEW (A)  NONE SEEN /HPF Final    Hyaline Casts, UA 08/19/2022 NONE SEEN  NONE SEEN /LPF Final    Yeast, UA 08/19/2022 FEW (A)  NONE SEEN /HPF Final    Service Cmt: 08/19/2022    Final    Urine Culture, Routine 08/19/2022    Final     Reviewed and discussed lab results in depth with full understanding.        Assessment:      1. Benign essential HTN    2. Type 2 diabetes mellitus without complications    3. Other hyperlipidemia    4. Morbid obesity with BMI of 40.0-44.9, adult     5. Folate deficiency    6. Vitamin D deficiency    7. Other specified hypothyroidism    8. SOB (shortness of breath)    9. Wheezing    10. Acute upper respiratory infection    11. Acute cough          Plan:     Benign essential HTN    Type 2 diabetes mellitus without complications    Other hyperlipidemia    Morbid obesity with BMI of 40.0-44.9, adult    Folate deficiency  -     folic acid (FOLVITE) 1 MG tablet; TAKE 1 TABLET BY MOUTH DAILY.  Dispense: 90 tablet; Refill: 3    Vitamin D deficiency  -     cholecalciferol, vitamin D3, 1,250 mcg (50,000 unit) capsule; TAKE 1 CAPSULE BY MOUTH WEEKLY AS DIRECTED  Dispense: 12 capsule; Refill: 0    Other specified hypothyroidism  -     levothyroxine (SYNTHROID) 88 MCG tablet; Take 1 tablet (88 mcg total) by mouth before breakfast.  Dispense: 90 tablet; Refill: 1    SOB (shortness of breath)  -     albuterol (PROVENTIL/VENTOLIN HFA) 90 mcg/actuation inhaler; INHALE 2 PUFFS BY MOUTH EVERY EVENING AS NEEDED FOR SHORTNESS OF BREATH OR FOR WHEEZING  Dispense: 18 g; Refill: 2    Wheezing  -     albuterol (PROVENTIL/VENTOLIN HFA) 90 mcg/actuation inhaler; INHALE 2 PUFFS BY MOUTH EVERY EVENING AS NEEDED FOR SHORTNESS OF BREATH OR FOR WHEEZING  Dispense: 18 g; Refill: 2    Acute upper respiratory infection  -     azithromycin (Z-SHRUTI) 250 MG tablet; Take 2 tablets by mouth on day 1; Take 1 tablet by mouth on days 2-5  Dispense: 6 tablet; Refill: 0    Acute cough  -     benzonatate (TESSALON) 100 MG capsule; Take 1 capsule (100 mg total) by mouth 3 (three) times daily as needed for Cough.  Dispense: 30 capsule; Refill: 0       Current Outpatient Medications   Medication Sig Dispense Refill    amitriptyline (ELAVIL) 25 MG tablet Take 25 mg by mouth once daily.      atorvastatin (LIPITOR) 80 MG tablet Take 80 mg by mouth once daily.      azelastine (ASTELIN) 137 mcg (0.1 %) nasal spray SPRAY 1 SPRAY in nose 2 TIMES A DAY thank you**ynes** :-) 30 mL 1    buPROPion (WELLBUTRIN SR) 150  "MG TBSR 12 hr tablet Take 1 tablet (150 mg total) by mouth 2 (two) times daily. 60 tablet 0    COMBIVENT RESPIMAT  mcg/actuation inhaler INHALE 1 PUFF 4 TIMES A DAY 4 g 1    cyclobenzaprine (FLEXERIL) 10 MG tablet TAKE 1 TABLET BY MOUTH 3 TIMES A DAY AS NEEDED FOR MUSCLE SPASMS MAY MAKE DROWSY      estradioL (ESTRACE) 1 MG tablet TAKE 1 TABLET BY MOUTH DAILY 90 tablet 3    fluocinonide (LIDEX) 0.05 % external solution APPLY to to AFFECTED AREA DAILY      fluticasone (VERAMYST) 27.5 mcg/actuation nasal spray 2 sprays by Nasal route.      loratadine (CLARITIN) 10 mg tablet Take 10 mg by mouth.      losartan (COZAAR) 100 MG tablet TAKE 1 TABLET BY MOUTH DAILY 90 tablet 3    MOUNJARO 10 mg/0.5 mL PnIj INJECT 10 mg SUBCUTANEOUS once a week      nicotine (NICODERM CQ) 7 mg/24 hr Place 1 patch onto the skin once daily. 28 patch 0    omeprazole (PRILOSEC) 20 MG capsule Take 1 capsule (20 mg total) by mouth once daily. 30 capsule 2    potassium chloride SA (K-DUR,KLOR-CON) 20 MEQ tablet TAKE 1 TABLET BY MOUTH DAILY 90 tablet 0    pregabalin (LYRICA) 300 MG Cap Take 300 mg by mouth 2 (two) times a day.      TOUJEO MAX U-300 SOLOSTAR 300 unit/mL (3 mL) insulin pen INJECT 100 UNITS SUBCUTANEOUS once DAILY      TRUEPLUS PEN NEEDLE 32 gauge x 5/32" Ndle USE DAILY OR AS DIRECTED      albuterol (PROVENTIL/VENTOLIN HFA) 90 mcg/actuation inhaler INHALE 2 PUFFS BY MOUTH EVERY EVENING AS NEEDED FOR SHORTNESS OF BREATH OR FOR WHEEZING 18 g 2    ammonium lactate (LAC-HYDRIN) 12 % lotion APPLY TO AFFECTED AREA 2 TIMES A DAY thank stephenie -)      azithromycin (Z-SHRUTI) 250 MG tablet Take 2 tablets by mouth on day 1; Take 1 tablet by mouth on days 2-5 6 tablet 0    benzonatate (TESSALON) 100 MG capsule Take 1 capsule (100 mg total) by mouth 3 (three) times daily as needed for Cough. 30 capsule 0    cholecalciferol, vitamin D3, 1,250 mcg (50,000 unit) capsule TAKE 1 CAPSULE BY MOUTH WEEKLY AS DIRECTED 12 capsule 0    folic acid (FOLVITE) " 1 MG tablet TAKE 1 TABLET BY MOUTH DAILY. 90 tablet 3    ketoconazole (NIZORAL) 2 % shampoo APPLY TOPICALLY to wash AFFECTED AREA DAILY      levothyroxine (SYNTHROID) 88 MCG tablet Take 1 tablet (88 mcg total) by mouth before breakfast. 90 tablet 1    MOUNJARO 12.5 mg/0.5 mL PnIj INJECT 1 PEN SUBCUTANEOUS once a week      triamcinolone acetonide 0.1% (KENALOG) 0.1 % cream APPLY TOPICALLY 2 TIMES A DAY. Apply to Frontal Scalp, Left Superior Cutaneous Lip (Apical Triangle), Right Superior Cutaneous Lip (Apical Triangle)]      XIGDUO XR 5-1,000 mg TBph TAKE 2 TABLETS BY MOUTH EACH MORNING       No current facility-administered medications for this visit.       Medications Discontinued During This Encounter   Medication Reason    montelukast (SINGULAIR) 10 mg tablet Patient no longer taking    nicotine polacrilex 4 MG Lozg Patient no longer taking    cholecalciferol, vitamin D3, 1,250 mcg (50,000 unit) capsule Reorder    albuterol (PROVENTIL/VENTOLIN HFA) 90 mcg/actuation inhaler Reorder    levothyroxine (SYNTHROID) 88 MCG tablet Reorder    folic acid (FOLVITE) 1 MG tablet Reorder         Health Maintenance   Topic Date Due    Foot Exam  Never done    Eye Exam  Never done    Mammogram  Never done    TETANUS VACCINE  08/06/2014    Hepatitis C Screening  02/21/2023 (Originally 1965)    Hemoglobin A1c  05/18/2023    Lipid Panel  08/19/2023    Low Dose Statin  11/21/2023       Patient Instructions   RTC in 3 months for F/U or sooner if needed.    Keep appts with specialists as scheduled.    Instructed patient to report to nearest ER or call 911 if begins to have difficulty breathing, turning blue, chest pain, B/P < 80/60 or >170/100, palpitations, syncope, extreme weakness, or severe H/A. Patient verbalized understanding.      Patient Education       Bacterial Upper Respiratory Infection, Adult   About this topic   Germs cause this health problem. You have signs in your nose, windpipe, voice box or larynx, throat,  ears, or lungs that last for days or weeks. It often spreads from a person who is sick to some other person from close contact. This health problem may include:  Sore throat. This is pharyngitis.  Swelling of the lining of the nose. This is rhinitis.  Swelling of the sinuses with pain in the face, forehead, or upper teeth. This is sinusitis.  Swelling of the nose and throat. This is nasopharyngitis.  Swelling of the upper part of the voice box. This is epiglottitis.  Swelling of the voice box. This is laryngitis.  Cough  What are the causes?   The main cause is an infection by certain germs.  What can make this more likely to happen?   Cold or winter season  Low immune system  Close contact with someone who has an upper respiratory infection  Allergies or asthma  Working in a school or day care center  What are the main signs?   Cough or sneezing  Sore throat  Runny or stuffy nose  Ear pain  Fever  Headache  Muscle pain  Tired  Weakness  How does the doctor diagnose this health problem?   Your doctor will do an exam and ask about your history. Your doctor will check your nose, throat, and lungs. The doctor may order:  Lab tests  Throat swab  Chest x-ray  CT or MRI scan  How does the doctor treat this health problem?   Your doctor may give you drugs to treat or prevent infection. You may also be given other drugs based on your condition. Talk to your doctor about what drugs you need to take.  What lifestyle changes are needed?   You need to rest while you are getting better. If your throat is sore, don't talk too much. This will rest your voice and throat. Drink plenty of fluids to stay hydrated.  What drugs may be needed?   The doctor may order drugs to:  Help with pain and swelling  Fight an infection  Dry up a stuffy nose  Stop wheezing  Control coughing  What can be done to prevent this health problem?   Wash your hands often with soap and water for at least 20 seconds, especially after coughing or sneezing.  Alcohol-based hand sanitizers also work to kill the virus.  If you are sick, cover your mouth and nose with tissue when you cough or sneeze. You can also cough into your elbow. Throw away tissues in the trash and wash your hands after touching used tissues.  Clean commonly handled things like door handles, remotes, toys, and phones. Wipe them with a disinfectant.  Do not get too close (kissing, hugging) to people who are sick.  Do not share food, drinks, towels, or hankies with anyone who is sick.  Stay away from crowded places.  Where can I learn more?   American Academy of Family Physicians  https://familydoctor.org/antibiotic-resistance/   American Academy of Family Physicians  https://familydoctor.org/condition/colds-and-the-flu/   Centers for Disease Control and Prevention  http://www.cdc.gov/getsmart/antibiotic-use/URI/index.html   NHS Choices  http://www.nhs.uk/conditions/Respiratory-tract-infection/Pages/Introduction.aspx   Last Reviewed Date   2020-01-24  Consumer Information Use and Disclaimer   This information is not specific medical advice and does not replace information you receive from your health care provider. This is only a brief summary of general information. It does NOT include all information about conditions, illnesses, injuries, tests, procedures, treatments, therapies, discharge instructions or life-style choices that may apply to you. You must talk with your health care provider for complete information about your health and treatment options. This information should not be used to decide whether or not to accept your health care providers advice, instructions or recommendations. Only your health care provider has the knowledge and training to provide advice that is right for you.  Copyright   Copyright © 2021 [employer] and its affiliates and/or licensors. All rights reserved.    Patient Education       Low Cholesterol, Saturated Fat, and Trans Fat Diet   About this topic  "  Cholesterol, saturated fat, and trans fat are in many foods. These may raise your blood cholesterol levels. If your cholesterol is too high, this can cause health problems in your heart, liver, kidneys, and even your eyes. The key to lowering your risk of heart problems is to lower your bad fat intake.  Saturated fats and trans fats are the bad fats. These fats clog your arteries and raise your bad cholesterol. Saturated fats and trans fats are solid fats at room temperature. Saturated fats are animal fats. Trans fats are manmade fats. They add flavor to a lot of packaged foods. Staying away from saturated and trans fats will help your heart.  When you do eat foods with fat, make sure they have the good fats. Monounsaturated and polyunsaturated fats are good fats. These fats help raise your good cholesterol and protect your heart.  General   How to Lower Fat and Cholesterol in Your Diet   Read the labels of the foods you buy from the market to find out how much fat is present. Under 5% of total fat on a label means it is "low fat". Over 20% of total fat on a label means it is high fat.  Eat high fiber foods, like soluble fiber. This type of fiber helps lower cholesterol in the body. Choose oatmeal, fruits (like apples), beans, and nuts to get the most soluble fiber.  Eat foods high in omega-3 fatty acids like gopal seeds, walnuts, salmon, tuna, trout, herring, flaxseed, and soybeans. These foods help keep the heart healthy.  Limit your bad fat and oil intake.  Stay away from butter, stick margarine, shortening, lard, and palm and coconut oil. Pick plant-based spreads instead.  Limit mayonnaise, salad dressings, gravies, and sauces, unless it is made from low-fat ingredients.  Limit chocolate.  Do not eat high-fat processed foods like hot dogs, feliciano, sausage, ham and other luncheon meats high in fat, and some frozen foods. Pick fish, chicken, turkey, and lean meats instead.  Eat more dried beans, lentils, and " tofu to get your protein.  Do not eat organ meats, like liver.  Choose nonfat or low-fat milk, yogurt, and cheese.  Use light or fat-free cream cheese and sour cream.  Eat lots of fruits and vegetables.  Pick whole grain breads, cereals, pastas, and rice.  Do not eat snacks that are high in fats like granola, cookies, pies, pastries, doughnuts, and croissants.  Stay away from deep fried foods.  Help When Cooking   Remove the fat portion of meats and the skin from poultry before cooking.  Bake, broil, grill, poach, or roast poultry, fish, and lean meats.  Drain and throw away the fat that drains out of meat as you cook it.  Try to add little or no fat to foods.  Use olive or canola oil for cooking or baking.  Steam your vegetables.  Use herbs or no-oil marinades to flavor foods.         Who should use this diet?   This diet is for people who are at high risk of getting health problems like heart disease, high blood pressure, diabetes, and others. This diet is also good for all people to follow to keep your heart healthy.  What foods are good to eat?   Foods with good fats are:  Canola, peanut, and olive oil  Safflower, soybean, and corn oil  Walnuts, almonds, cashews, and peanuts  Pumpkin and sunflower seeds  Gallant and tuna  Tofu  Soymilk  Avocado  What foods should be limited or avoided?   Stay away from these types of foods that have saturated fats:  Whole fat dairy products like cheese, ice cream, whole milk, and cream  Palm and coconut oils  High-fat meats like beef, lamb, poultry with the skin, feliciano, and sausage  Butter and lard  Stay away from these types of foods that may have trans fat:  Cookies, cakes, candy, doughnuts, baked goods, muffins, pizza dough, and pie crusts that are packaged  Fried foods  Frozen dinners  Chips and crackers  Microwave popcorn  Stick margarine and vegetable shortenings  Helpful tips   To help stay away from saturated fat:  Pick lean cuts of meat  Take the skin off chicken and  turkey or pick skinless  Pick low-fat cheese, milk, and ice cream  Use liquid oils when cooking and baking, such as olive oil and canola oil  To help stay away from trans fat:  Look at your labels. Choose foods with 0% trans fat. Read the ingredient list. Avoid foods with partially hydrogenated oil in the ingredient list. This means there is trans fat in the product.  Where can I learn more?   American Heart Association   http://www.heart.org/HEARTORG/Conditions/Cholesterol/PreventionTreatmentofHighCholesterol/Know-Your-Fats_[facility]_305628_Article.jsp   Last Reviewed Date   2021-10-05  Consumer Information Use and Disclaimer   This information is not specific medical advice and does not replace information you receive from your health care provider. This is only a brief summary of general information. It does NOT include all information about conditions, illnesses, injuries, tests, procedures, treatments, therapies, discharge instructions or life-style choices that may apply to you. You must talk with your health care provider for complete information about your health and treatment options. This information should not be used to decide whether or not to accept your health care providers advice, instructions or recommendations. Only your health care provider has the knowledge and training to provide advice that is right for you.   Copyright   Copyright © 2021 UpToDate, Inc. and its affiliates and/or licensors. All rights reserved.      Risks, benefits, and alternatives discussed with patient, Patient verbalized understanding of discussed plan of care. Asked patient if any further questions, answered no.    No future appointments.             Garrett Pulliam NP

## 2022-12-15 DIAGNOSIS — K21.00 GASTROESOPHAGEAL REFLUX DISEASE WITH ESOPHAGITIS WITHOUT HEMORRHAGE: ICD-10-CM

## 2022-12-15 RX ORDER — OMEPRAZOLE 20 MG/1
CAPSULE, DELAYED RELEASE ORAL
Qty: 30 CAPSULE | Refills: 2 | Status: SHIPPED | OUTPATIENT
Start: 2022-12-15 | End: 2023-02-21

## 2023-01-24 DIAGNOSIS — E87.6 LOW BLOOD POTASSIUM: ICD-10-CM

## 2023-01-25 RX ORDER — POTASSIUM CHLORIDE 20 MEQ/1
TABLET, EXTENDED RELEASE ORAL
Qty: 90 TABLET | Refills: 0 | Status: SHIPPED | OUTPATIENT
Start: 2023-01-25 | End: 2023-04-03

## 2023-02-09 LAB
ALBUMIN/GLOB SERPL ELPH: 1.3 {RATIO} (ref 1–2.7)
ALBUMIN: 4.2 G/DL (ref 3.5–5.2)
ALP SERPL-CCNC: 134 U/L (ref 35–105)
ALT SERPL W P-5'-P-CCNC: 14 U/L (ref 0–33)
ANION GAP SERPL CALC-SCNC: 9 MMOL/L (ref 8–17)
AST SERPL-CCNC: 11 U/L (ref 0–32)
BILIRUB SERPL-MCNC: 0.3 MG/DL (ref 0–1.2)
BUN SERPL-MCNC: 17.7 MG/DL (ref 6–20)
BUN/CREAT RATIO: 18.4 (ref 6–20)
CALCIUM SERPL-MCNC: 9.4 MG/DL (ref 8.6–10.2)
CHLORIDE SERPL-SCNC: 98 MMOL/L (ref 98–107)
CHOLEST SERPL-MSCNC: 164 MG/DL (ref 100–200)
CO2 SERPL-SCNC: 28 MMOL/L (ref 22–29)
CREAT SERPL-MCNC: 1 MG/DL (ref 0.5–0.9)
GFR ESTIMATION: 69.44
GLOBULIN: 3.3 G/DL (ref 1.5–4.5)
GLUCOSE SERPL-MCNC: 136 MG/DL (ref 74–106)
HBA1C MFR BLD: 6.6 % (ref 4–6)
HDLC SERPL-MCNC: 39 MG/DL
LDL/HDL RATIO: 1.8 (ref 1–3)
LDLC SERPL CALC-MCNC: 70 MG/DL (ref 0–100)
POTASSIUM SERPL-SCNC: 4.4 MMOL/L (ref 3.5–5.1)
PROT SNV-MCNC: 7.5 G/DL (ref 6.4–8.3)
SODIUM BLD-SCNC: 135 MMOL/L (ref 136–145)
TRIGL SERPL-MCNC: 276 MG/DL (ref 0–150)

## 2023-02-20 DIAGNOSIS — J06.9 ACUTE UPPER RESPIRATORY INFECTION: ICD-10-CM

## 2023-02-20 DIAGNOSIS — K21.00 GASTROESOPHAGEAL REFLUX DISEASE WITH ESOPHAGITIS WITHOUT HEMORRHAGE: ICD-10-CM

## 2023-02-20 DIAGNOSIS — J43.8 OTHER EMPHYSEMA: ICD-10-CM

## 2023-02-21 RX ORDER — IPRATROPIUM BROMIDE AND ALBUTEROL 20; 100 UG/1; UG/1
SPRAY, METERED RESPIRATORY (INHALATION)
Qty: 4 G | Refills: 0 | Status: SHIPPED | OUTPATIENT
Start: 2023-02-21 | End: 2023-08-30 | Stop reason: SDUPTHER

## 2023-02-21 RX ORDER — AZITHROMYCIN 250 MG/1
TABLET, FILM COATED ORAL
Qty: 6 TABLET | Refills: 0 | Status: SHIPPED | OUTPATIENT
Start: 2023-02-21 | End: 2023-02-23

## 2023-02-21 RX ORDER — OMEPRAZOLE 20 MG/1
CAPSULE, DELAYED RELEASE ORAL
Qty: 30 CAPSULE | Refills: 2 | Status: SHIPPED | OUTPATIENT
Start: 2023-02-21 | End: 2023-07-31

## 2023-02-23 ENCOUNTER — OFFICE VISIT (OUTPATIENT)
Dept: PRIMARY CARE CLINIC | Facility: CLINIC | Age: 58
End: 2023-02-23
Payer: MEDICARE

## 2023-02-23 VITALS
BODY MASS INDEX: 44.48 KG/M2 | RESPIRATION RATE: 18 BRPM | OXYGEN SATURATION: 96 % | SYSTOLIC BLOOD PRESSURE: 104 MMHG | DIASTOLIC BLOOD PRESSURE: 60 MMHG | WEIGHT: 267 LBS | HEART RATE: 89 BPM | HEIGHT: 65 IN

## 2023-02-23 DIAGNOSIS — E78.49 OTHER HYPERLIPIDEMIA: ICD-10-CM

## 2023-02-23 DIAGNOSIS — Z12.31 BREAST CANCER SCREENING BY MAMMOGRAM: ICD-10-CM

## 2023-02-23 DIAGNOSIS — E11.49 TYPE 2 DIABETES MELLITUS WITH OTHER NEUROLOGIC COMPLICATION, WITH LONG-TERM CURRENT USE OF INSULIN: ICD-10-CM

## 2023-02-23 DIAGNOSIS — M51.36 DEGENERATIVE DISC DISEASE, LUMBAR: ICD-10-CM

## 2023-02-23 DIAGNOSIS — Z12.11 SCREENING FOR COLORECTAL CANCER: ICD-10-CM

## 2023-02-23 DIAGNOSIS — Z79.4 TYPE 2 DIABETES MELLITUS WITH OTHER NEUROLOGIC COMPLICATION, WITH LONG-TERM CURRENT USE OF INSULIN: ICD-10-CM

## 2023-02-23 DIAGNOSIS — E03.8 OTHER SPECIFIED HYPOTHYROIDISM: ICD-10-CM

## 2023-02-23 DIAGNOSIS — K21.00 GASTROESOPHAGEAL REFLUX DISEASE WITH ESOPHAGITIS WITHOUT HEMORRHAGE: ICD-10-CM

## 2023-02-23 DIAGNOSIS — Z00.00 ANNUAL PHYSICAL EXAM: ICD-10-CM

## 2023-02-23 DIAGNOSIS — I10 BENIGN ESSENTIAL HTN: Primary | ICD-10-CM

## 2023-02-23 DIAGNOSIS — E66.01 MORBID OBESITY WITH BMI OF 40.0-44.9, ADULT: ICD-10-CM

## 2023-02-23 DIAGNOSIS — G47.09 OTHER INSOMNIA: ICD-10-CM

## 2023-02-23 DIAGNOSIS — Z12.12 SCREENING FOR COLORECTAL CANCER: ICD-10-CM

## 2023-02-23 PROCEDURE — 1159F MED LIST DOCD IN RCRD: CPT | Mod: CPTII,S$GLB,, | Performed by: NURSE PRACTITIONER

## 2023-02-23 PROCEDURE — 3074F PR MOST RECENT SYSTOLIC BLOOD PRESSURE < 130 MM HG: ICD-10-PCS | Mod: CPTII,S$GLB,, | Performed by: NURSE PRACTITIONER

## 2023-02-23 PROCEDURE — 1160F PR REVIEW ALL MEDS BY PRESCRIBER/CLIN PHARMACIST DOCUMENTED: ICD-10-PCS | Mod: CPTII,S$GLB,, | Performed by: NURSE PRACTITIONER

## 2023-02-23 PROCEDURE — 3078F DIAST BP <80 MM HG: CPT | Mod: CPTII,S$GLB,, | Performed by: NURSE PRACTITIONER

## 2023-02-23 PROCEDURE — 3078F PR MOST RECENT DIASTOLIC BLOOD PRESSURE < 80 MM HG: ICD-10-PCS | Mod: CPTII,S$GLB,, | Performed by: NURSE PRACTITIONER

## 2023-02-23 PROCEDURE — 99396 PR PREVENTIVE VISIT,EST,40-64: ICD-10-PCS | Mod: S$GLB,,, | Performed by: NURSE PRACTITIONER

## 2023-02-23 PROCEDURE — 99396 PREV VISIT EST AGE 40-64: CPT | Mod: S$GLB,,, | Performed by: NURSE PRACTITIONER

## 2023-02-23 PROCEDURE — 1160F RVW MEDS BY RX/DR IN RCRD: CPT | Mod: CPTII,S$GLB,, | Performed by: NURSE PRACTITIONER

## 2023-02-23 PROCEDURE — 1159F PR MEDICATION LIST DOCUMENTED IN MEDICAL RECORD: ICD-10-PCS | Mod: CPTII,S$GLB,, | Performed by: NURSE PRACTITIONER

## 2023-02-23 PROCEDURE — 3008F PR BODY MASS INDEX (BMI) DOCUMENTED: ICD-10-PCS | Mod: CPTII,S$GLB,, | Performed by: NURSE PRACTITIONER

## 2023-02-23 PROCEDURE — 3008F BODY MASS INDEX DOCD: CPT | Mod: CPTII,S$GLB,, | Performed by: NURSE PRACTITIONER

## 2023-02-23 PROCEDURE — 3074F SYST BP LT 130 MM HG: CPT | Mod: CPTII,S$GLB,, | Performed by: NURSE PRACTITIONER

## 2023-02-23 RX ORDER — AMITRIPTYLINE HYDROCHLORIDE 50 MG/1
50 TABLET, FILM COATED ORAL NIGHTLY
Qty: 90 TABLET | Refills: 1 | Status: SHIPPED | OUTPATIENT
Start: 2023-02-23 | End: 2023-08-15

## 2023-02-23 RX ORDER — FUROSEMIDE 40 MG/1
40 TABLET ORAL
COMMUNITY
End: 2023-05-30

## 2023-02-23 RX ORDER — TIRZEPATIDE 15 MG/.5ML
INJECTION, SOLUTION SUBCUTANEOUS
COMMUNITY
Start: 2023-02-13 | End: 2023-06-19 | Stop reason: SDUPTHER

## 2023-02-23 RX ORDER — DOXYCYCLINE HYCLATE 100 MG
100 TABLET ORAL 2 TIMES DAILY
COMMUNITY
Start: 2023-01-02 | End: 2023-02-23 | Stop reason: ALTCHOICE

## 2023-02-23 RX ORDER — DULOXETIN HYDROCHLORIDE 30 MG/1
CAPSULE, DELAYED RELEASE ORAL
COMMUNITY
Start: 2023-02-13 | End: 2023-07-20 | Stop reason: SDUPTHER

## 2023-02-23 RX ORDER — ERGOCALCIFEROL 1.25 MG/1
1.25 CAPSULE ORAL
COMMUNITY
End: 2023-02-23 | Stop reason: SDUPTHER

## 2023-02-23 NOTE — PROGRESS NOTES
Subjective:       Patient ID: Anca Grace is a 57 y.o. female.    Chief Complaint: Annual Exam      HPI: Anca is a 57 y.o. female who presents for annual visit.    Feels well today with no new issues or concerns.    Labs recently drawn on 02/09/2023 prior to Endocrinology visit with Walt David NP and her A1C was 6.6 which improved from A1C of 7.0 in November, estimated glucose 143. Thyroid function was normal. Triglycerides were elevated at 276, LDL normal at 69.8.     DM II -  Chronic and uncontrolled on meds, b/p averaging < 130/80, denies s/e or a/r. Followed by Walt David for Endocrinology for her DM Type 2 and Hypothyroidism, last seen on 02/09/23 with labs done. Still takes Toujeo 120 units daily, Mounjaro weekly but was increased to 15 mg last week and takes Xigduo daily. Has Neuropathy and she takes Lyrica for it which is prescribed by Orthopedic. [facility] and all was well. She also has diabetic foot exams annually by Dr. Infante in [place].    Hyperlipidemia - controlled with a STATIN. Denies se/ar. Say she has been trying to eat healthier.    Hypothyroidism - takes levothyroxine 75 mcg daily. Thyroid function was normal on recent labs. Denies se/ar to medication.    Followed by Dr. Angel in [place]. Had a carpal tunnel surgery, scope to left knee last August and some of her bone was scraped for her arthritis. Follows up monthly for muscle relaxer injections. Elavil increased to 50 mg daily, flexeril, cymbalta and still on Lyrica as well. Last seen in December. Also followed by Dr. Gallegos for lower back pain and he does back injections.    Has cut down on smoking from 1 ppd to 6-7 cigarettes daily and has smoked for 15 years. No longer doing smoking cessation, tried Wellbutrin, patches and Chantix with no success.    Mammogram done last year in [place], will order today.     Had a Colonoscopy done at [facility] by Dr. Valencia in 2018 with 3 polyps  found and removed, told to repeat in 3 years so she is due now. Will refer back to Dr. Valencia.     History of Hysterectomy in 2012.    UTD with COVID and FLU vaccines.          Follow-up  Associated symptoms include arthralgias (knees). Pertinent negatives include no abdominal pain, chest pain, chills, congestion, coughing, diaphoresis, fatigue, fever, headaches, nausea, neck pain, numbness, rash, sore throat, vomiting or weakness.       Past Medical History:   Diagnosis Date    Diabetes mellitus, type 2     GERD (gastroesophageal reflux disease)     Hyperlipidemia     Hypertension        Past Surgical History:   Procedure Laterality Date    CARPAL TUNNEL RELEASE      HYSTERECTOMY         Family History   Problem Relation Age of Onset    Thyroiditis Mother     Heart disease Father     Graves' disease Sister     Heart disease Maternal Grandfather     Cancer Paternal Grandfather        Social History     Tobacco Use    Smoking status: Every Day     Types: Cigarettes    Smokeless tobacco: Never   Substance Use Topics    Alcohol use: Never    Drug use: Never       Patient Active Problem List   Diagnosis    Degenerative disc disease, cervical    Degenerative disc disease, lumbar    Neck pain    Bilateral arm pain    Low back pain radiating to right leg    Morbid obesity with BMI of 40.0-44.9, adult    Hyperlipidemia    Benign essential HTN    Type 2 diabetes mellitus with neurologic complication, with long-term current use of insulin    Gastroesophageal reflux disease with esophagitis without hemorrhage       Immunization History   Administered Date(s) Administered    COVID-19, MRNA, LN-S, PF (MODERNA FULL 0.5 ML DOSE) 10/30/2021, 11/27/2021    DTaP 1966, 01/18/1967, 03/08/1967, 04/10/1968, 03/03/1971, 08/01/1977    IPV 1966, 01/18/1967, 03/08/1967, 04/10/1968, 04/07/1978    Influenza - Quadrivalent - PF *Preferred* (6 months and older) 10/14/2021    Measles 11/07/1967    Mumps 11/13/1968    Rubella  "10/21/1970    Tdap 04/13/1988, 08/06/2004           Review of Systems   Constitutional:  Negative for activity change, appetite change, chills, diaphoresis, fatigue and fever.   HENT:  Positive for rhinorrhea. Negative for congestion, ear discharge, ear pain, postnasal drip, sinus pressure, sinus pain, sneezing, sore throat, tinnitus and trouble swallowing.    Eyes:  Negative for visual disturbance.   Respiratory:  Negative for cough, chest tightness, shortness of breath and wheezing.    Cardiovascular:  Negative for chest pain, palpitations and leg swelling.   Gastrointestinal:  Negative for abdominal distention, abdominal pain, blood in stool, constipation, diarrhea, nausea and vomiting.   Endocrine: Negative for cold intolerance, heat intolerance, polydipsia, polyphagia and polyuria.   Genitourinary:  Negative for decreased urine volume, dysuria, frequency, hematuria, pelvic pain and urgency.   Musculoskeletal:  Positive for arthralgias (knees). Negative for back pain, gait problem and neck pain.   Skin:  Negative for color change and rash.   Neurological:  Negative for dizziness, syncope, weakness, light-headedness, numbness and headaches.   Hematological:  Negative for adenopathy. Does not bruise/bleed easily.   Psychiatric/Behavioral:  Negative for behavioral problems, confusion, dysphoric mood and suicidal ideas. The patient is not nervous/anxious.    Objective:     Vitals:    02/23/23 1117   BP: 104/60   BP Location: Left arm   Patient Position: Sitting   BP Method: Medium (Automatic)   Pulse: 89   Resp: 18   SpO2: 96%   Weight: 121.1 kg (267 lb)   Height: 5' 5" (1.651 m)         Physical Exam  Vitals and nursing note reviewed.   Constitutional:       Appearance: Normal appearance. She is obese. She is not ill-appearing or diaphoretic.   HENT:      Head: Normocephalic and atraumatic.      Nose:      Right Sinus: No maxillary sinus tenderness or frontal sinus tenderness.      Left Sinus: No maxillary sinus " tenderness or frontal sinus tenderness.      Mouth/Throat:      Mouth: Mucous membranes are moist.      Pharynx: Oropharynx is clear.      Tonsils: No tonsillar exudate.   Eyes:      Extraocular Movements: Extraocular movements intact.      Conjunctiva/sclera: Conjunctivae normal.      Pupils: Pupils are equal, round, and reactive to light.   Neck:      Thyroid: No thyromegaly or thyroid tenderness.   Cardiovascular:      Rate and Rhythm: Normal rate and regular rhythm.      Pulses: Normal pulses.      Heart sounds: Normal heart sounds.   Pulmonary:      Effort: Pulmonary effort is normal.      Breath sounds: Normal breath sounds. No decreased air movement.   Abdominal:      General: Bowel sounds are normal. There is no distension.      Palpations: Abdomen is soft.      Tenderness: There is no abdominal tenderness.   Musculoskeletal:         General: Tenderness (lower back) present. Normal range of motion.      Cervical back: Normal range of motion and neck supple.      Right lower leg: No edema.      Left lower leg: No edema.   Lymphadenopathy:      Cervical: No cervical adenopathy.   Skin:     General: Skin is warm and dry.      Capillary Refill: Capillary refill takes less than 2 seconds.   Neurological:      Mental Status: She is alert and oriented to person, place, and time.      Sensory: Sensation is intact.      Motor: Motor function is intact.      Coordination: Coordination is intact.      Gait: Gait is intact.   Psychiatric:         Mood and Affect: Mood and affect normal.         Speech: Speech normal.         Behavior: Behavior normal. Behavior is cooperative.         Thought Content: Thought content normal. Thought content does not include homicidal or suicidal ideation. Thought content does not include homicidal or suicidal plan.         Cognition and Memory: Cognition and memory normal.         Judgment: Judgment normal.       Patient Outreach on 09/23/2022   Component Date Value Ref Range Status     CRC Recommendation External 08/07/2018 Repeat colonoscopy in 3 years   Final     Reviewed and discussed lab results in depth with full understanding.        Assessment:      1. Benign essential HTN Continue current regimen   2. Other hyperlipidemia    3. Morbid obesity with BMI of 40.0-44.9, adult    4. Type 2 diabetes mellitus with other neurologic complication, with long-term current use of insulin Continue current regimen   5. Gastroesophageal reflux disease with esophagitis without hemorrhage    6. Other specified hypothyroidism    7. Degenerative disc disease, lumbar    8. Breast cancer screening by mammogram    9. Screening for colorectal cancer    10. Other insomnia    11. Annual physical exam            Plan:     Benign essential HTN  Comments:  controlled    Other hyperlipidemia  Comments:  continue STATIN, eat a low cholesterol diet and exercise regularly    Morbid obesity with BMI of 40.0-44.9, adult  Comments:  Eat a low carb, low sugar diet and exercise regularly    Type 2 diabetes mellitus with other neurologic complication, with long-term current use of insulin  Comments:  controlled    Gastroesophageal reflux disease with esophagitis without hemorrhage  Comments:  continue medication, avoid large meals    Other specified hypothyroidism  Comments:  continue levothyroxine daily, controlled    Degenerative disc disease, lumbar    Breast cancer screening by mammogram  -     Mammo Digital Screening Bilat; Future; Expected date: 02/23/2023    Screening for colorectal cancer  -     Ambulatory referral/consult to Gastroenterology; Future; Expected date: 03/02/2023    Other insomnia  -     amitriptyline (ELAVIL) 50 MG tablet; Take 1 tablet (50 mg total) by mouth every evening.  Dispense: 90 tablet; Refill: 1    Annual physical exam  Comments:  Recent labs reviewed and discussed in detail with patient           Current Outpatient Medications   Medication Sig Dispense Refill    albuterol (PROVENTIL/VENTOLIN  HFA) 90 mcg/actuation inhaler INHALE 2 PUFFS BY MOUTH EVERY EVENING AS NEEDED FOR SHORTNESS OF BREATH OR FOR WHEEZING 18 g 2    ammonium lactate (LAC-HYDRIN) 12 % lotion APPLY TO AFFECTED AREA 2 TIMES A DAY thank youmike -)      atorvastatin (LIPITOR) 80 MG tablet Take 80 mg by mouth once daily.      azelastine (ASTELIN) 137 mcg (0.1 %) nasal spray SPRAY 1 SPRAY in nose 2 TIMES A DAY thank you**ynes** :-) 30 mL 1    cholecalciferol, vitamin D3, 1,250 mcg (50,000 unit) capsule TAKE 1 CAPSULE BY MOUTH WEEKLY AS DIRECTED 12 capsule 0    COMBIVENT RESPIMAT  mcg/actuation inhaler INHALE 1 PUFF 4 TIMES A DAY 4 g 0    cyclobenzaprine (FLEXERIL) 10 MG tablet TAKE 1 TABLET BY MOUTH 3 TIMES A DAY AS NEEDED FOR MUSCLE SPASMS MAY MAKE DROWSY      estradioL (ESTRACE) 1 MG tablet TAKE 1 TABLET BY MOUTH DAILY 90 tablet 3    fluticasone (VERAMYST) 27.5 mcg/actuation nasal spray 2 sprays by Nasal route.      folic acid (FOLVITE) 1 MG tablet TAKE 1 TABLET BY MOUTH DAILY. 90 tablet 3    furosemide (LASIX) 40 MG tablet Take 40 mg by mouth.      levothyroxine (SYNTHROID) 88 MCG tablet Take 1 tablet (88 mcg total) by mouth before breakfast. 90 tablet 1    loratadine (CLARITIN) 10 mg tablet Take 10 mg by mouth.      losartan (COZAAR) 100 MG tablet TAKE 1 TABLET BY MOUTH DAILY 90 tablet 3    MOUNJARO 15 mg/0.5 mL PnIj       omeprazole (PRILOSEC) 20 MG capsule TAKE 1 CAPSULE BY MOUTH ONCE DAILY thank you**ynes** :-) 30 capsule 2    potassium chloride SA (K-DUR,KLOR-CON) 20 MEQ tablet TAKE 1 TABLET BY MOUTH DAILY 90 tablet 0    pregabalin (LYRICA) 300 MG Cap Take 300 mg by mouth 2 (two) times a day.      TOUJEO MAX U-300 SOLOSTAR 300 unit/mL (3 mL) insulin pen INJECT 100 UNITS SUBCUTANEOUS once DAILY      XIGDUO XR 5-1,000 mg TBph TAKE 2 TABLETS BY MOUTH EACH MORNING      amitriptyline (ELAVIL) 50 MG tablet Take 1 tablet (50 mg total) by mouth every evening. 90 tablet 1    DULoxetine (CYMBALTA) 30 MG capsule TAKE 1 CAPSULE BY MOUTH  "once DAILY for first week THEN INCREASE to 2 TIMES A DAY AS NEEDED FOR PAIN      TRUEPLUS PEN NEEDLE 32 gauge x 5/32" Ndle USE DAILY OR AS DIRECTED       No current facility-administered medications for this visit.       Medications Discontinued During This Encounter   Medication Reason    nicotine (NICODERM CQ) 7 mg/24 hr Patient no longer taking    MOUNJARO 12.5 mg/0.5 mL PnIj Change in Dosage Form    MOUNJARO 10 mg/0.5 mL PnIj Patient no longer taking    triamcinolone acetonide 0.1% (KENALOG) 0.1 % cream Patient no longer taking    fluocinonide (LIDEX) 0.05 % external solution Patient no longer taking    ketoconazole (NIZORAL) 2 % shampoo Patient no longer taking    buPROPion (WELLBUTRIN SR) 150 MG TBSR 12 hr tablet Patient no longer taking    azithromycin (Z-SHRUTI) 250 MG tablet Patient no longer taking    doxycycline (VIBRA-TABS) 100 MG tablet Therapy completed    ergocalciferol (ERGOCALCIFEROL) 50,000 unit Cap Duplicate Order    amitriptyline (ELAVIL) 25 MG tablet Change in Dosage Form           Health Maintenance   Topic Date Due    Hepatitis C Screening  Never done    Foot Exam  Never done    Eye Exam  Never done    Mammogram  Never done    TETANUS VACCINE  08/06/2014    Hemoglobin A1c  05/18/2023    Lipid Panel  08/19/2023    Low Dose Statin  02/23/2024       Patient Instructions   RTC in 3 months for F/U or sooner if needed.    Keep appts with specialists as scheduled.    Instructed patient to report to nearest ER or call 911 if begins to have difficulty breathing, turning blue, chest pain, B/P < 80/60 or >170/100, palpitations, syncope, extreme weakness, or severe H/A. Patient verbalized understanding.      Patient Education       Yearly Physical for Adults   About this topic   Most people do not want to be sick. Having a checkup each year with your doctor is one way to help you stay healthy. You may need to see your doctor more or less often. How often you need to go to the doctor depends on your age. Your " family and medical history also play a role in how often you need to go to the doctor. Going to see your doctor on a routine basis can help you find problems early or even before they start. This may make it easier to treat or cure your problem.  General   Your doctor will talk about many things during your checkup. Your doctor may ask about:  Your medical and family history.  All the drugs you are taking. Be sure to include all prescription, over the counter, and herbal supplements. Tell the doctor if you have any drug allergy. Bring a list of drugs you take with you.  How you are feeling and if you are having any problems.  Risky behaviors like smoking, drinking alcohol, using illegal drugs, not wearing seatbelts, having unprotected sex, etc.  Your doctor will do a physical exam and may check your:  Height and weight  Blood pressure  Reflexes  Memory  Vision  Hearing  Your doctor may order:  Lab tests  ECG to check your heart rhythm  X-rays  Tests or treatments based on your exam  What lifestyle changes are needed?   Your doctor may suggest you make changes to your lifestyle at this visit. The doctor may talk with you about being more active or lowering stress levels. Ask your doctor what you need to do.  What drugs may be needed?   Your doctor may order drugs or vaccines to protect you from illnesses.  What changes to diet are needed?   Talk to your doctor to see if any changes are needed to your diet.  When do I need to call the doctor?   Call your doctor if you need to learn about any test results. Together you can make a plan for more care.  Helpful tips   Make a list of questions for your doctor before you go. This will help you remember to ask about any concerns. Write down any answers from your doctor so you can look over them after your visit.   Tell your doctor about any changes in your body or health since your last visit.  Ask your doctor about any screening tests you need.  Where can I learn more?    American Academy of [employer]icians  http://familydoctor.org/familydoctor/en/prevention-wellness/staying-healthy/healthy-living/preventive-services-for-healthy-living.printerview.html   Centers for Disease Control  http://www.cdc.gov/family/checkup/   Last Reviewed Date   2019-04-22  Consumer Information Use and Disclaimer   This information is not specific medical advice and does not replace information you receive from your health care provider. This is only a brief summary of general information. It does NOT include all information about conditions, illnesses, injuries, tests, procedures, treatments, therapies, discharge instructions or life-style choices that may apply to you. You must talk with your health care provider for complete information about your health and treatment options. This information should not be used to decide whether or not to accept your health care providers advice, instructions or recommendations. Only your health care provider has the knowledge and training to provide advice that is right for you.  Copyright   Copyright © 2021 UpToDate, Inc. and its affiliates and/or licensors. All rights reserved.      Risks, benefits, and alternatives discussed with patient, Patient verbalized understanding of discussed plan of care. Asked patient if any further questions, answered no.    Future Appointments   Date Time Provider Department Center   5/29/2023  2:20 PM Yaima Pulliam NP [facility] Finn Pulliam NP

## 2023-03-06 DIAGNOSIS — R06.02 SOB (SHORTNESS OF BREATH): ICD-10-CM

## 2023-03-06 DIAGNOSIS — R06.2 WHEEZING: ICD-10-CM

## 2023-03-06 RX ORDER — ALBUTEROL SULFATE 90 UG/1
AEROSOL, METERED RESPIRATORY (INHALATION)
Qty: 6.7 G | Refills: 2 | Status: SHIPPED | OUTPATIENT
Start: 2023-03-06 | End: 2023-08-28

## 2023-03-07 ENCOUNTER — PATIENT OUTREACH (OUTPATIENT)
Dept: ADMINISTRATIVE | Facility: HOSPITAL | Age: 58
End: 2023-03-07
Payer: MEDICARE

## 2023-03-07 NOTE — PROGRESS NOTES
[facility] Report: Per chart review, mammogram overdue, order placed 02/22/23, last mammo found 05/31/18 [facility], uploaded from [employer] [employer] and hyperlinked under .

## 2023-03-14 ENCOUNTER — PATIENT OUTREACH (OUTPATIENT)
Dept: ADMINISTRATIVE | Facility: HOSPITAL | Age: 58
End: 2023-03-14
Payer: MEDICARE

## 2023-03-29 ENCOUNTER — TELEPHONE (OUTPATIENT)
Dept: SMOKING CESSATION | Facility: CLINIC | Age: 58
End: 2023-03-29
Payer: MEDICARE

## 2023-03-29 NOTE — TELEPHONE ENCOUNTER
1st attempt left message regarding smoking cessation quit 1 episode.  Voice message included name and contact information.

## 2023-04-03 DIAGNOSIS — E87.6 LOW BLOOD POTASSIUM: ICD-10-CM

## 2023-04-03 DIAGNOSIS — E55.9 VITAMIN D DEFICIENCY: ICD-10-CM

## 2023-04-03 RX ORDER — ASPIRIN 325 MG
TABLET, DELAYED RELEASE (ENTERIC COATED) ORAL
Qty: 12 CAPSULE | Refills: 2 | Status: SHIPPED | OUTPATIENT
Start: 2023-04-03 | End: 2024-01-29 | Stop reason: SDUPTHER

## 2023-04-03 RX ORDER — POTASSIUM CHLORIDE 20 MEQ/1
TABLET, EXTENDED RELEASE ORAL
Qty: 30 TABLET | Refills: 2 | Status: SHIPPED | OUTPATIENT
Start: 2023-04-03 | End: 2023-09-11

## 2023-05-11 ENCOUNTER — PATIENT OUTREACH (OUTPATIENT)
Dept: ADMINISTRATIVE | Facility: HOSPITAL | Age: 58
End: 2023-05-11
Payer: MEDICARE

## 2023-05-11 NOTE — PROGRESS NOTES
[facility] Mammo report: Attempted to contact the patient to discuss overdue mammogram, no answer, left voicemail.

## 2023-05-15 ENCOUNTER — TELEPHONE (OUTPATIENT)
Dept: SMOKING CESSATION | Facility: CLINIC | Age: 58
End: 2023-05-15
Payer: MEDICARE

## 2023-05-16 ENCOUNTER — CLINICAL SUPPORT (OUTPATIENT)
Dept: SMOKING CESSATION | Facility: CLINIC | Age: 58
End: 2023-05-16
Payer: COMMERCIAL

## 2023-05-16 DIAGNOSIS — F17.200 NICOTINE DEPENDENCE: Primary | ICD-10-CM

## 2023-05-16 PROCEDURE — 99407 PR TOBACCO USE CESSATION INTENSIVE >10 MINUTES: ICD-10-PCS | Mod: S$GLB,,, | Performed by: GENERAL PRACTICE

## 2023-05-16 PROCEDURE — 99407 BEHAV CHNG SMOKING > 10 MIN: CPT | Mod: S$GLB,,, | Performed by: GENERAL PRACTICE

## 2023-05-24 LAB
ALBUMIN SERPL BCP-MCNC: 3.6 G/DL (ref 3.5–5.2)
ALBUMIN/GLOBULIN RATIO: 1.2 (ref 1–2.7)
ALP SERPL-CCNC: 116 U/L (ref 35–105)
ALT SERPL W P-5'-P-CCNC: 13 U/L (ref 0–33)
ANION GAP SERPL CALC-SCNC: 13 MMOL/L (ref 8–17)
AST SERPL-CCNC: 10 U/L (ref 0–32)
BILIRUB SERPL-MCNC: 0.4 MG/DL (ref 0–1.2)
BUN SERPL-MCNC: 22.8 MG/DL (ref 6–20)
BUN/CREAT RATIO: 18.1 (ref 6–20)
CALCIUM SERPL-MCNC: 9.5 MG/DL (ref 8.6–10.2)
CHLORIDE SERPL-SCNC: 97 MMOL/L (ref 98–107)
CHOLEST SERPL-MSCNC: 115 MG/DL (ref 100–200)
CO2 SERPL-SCNC: 22 MMOL/L (ref 22–29)
CREAT SERPL-MCNC: 1.3 MG/DL (ref 0.5–0.9)
GFR ESTIMATION: 50.11
GLOBULIN: 2.9 (ref 1.5–4.5)
GLUCOSE SERPL-MCNC: 152 MG/DL (ref 74–106)
HBA1C MFR BLD: 7.4 % (ref 4–6)
HDLC SERPL-MCNC: 34 MG/DL
LDL/HDL RATIO: 0.8 (ref 1–3)
LDLC SERPL CALC-MCNC: 26 MG/DL (ref 0–100)
POTASSIUM SERPL-SCNC: 4.7 MMOL/L (ref 3.5–5.1)
PROT SNV-MCNC: 6.5 G/L (ref 6.4–8.3)
SODIUM BLD-SCNC: 132 MMOL/L (ref 136–145)
TRIGL SERPL-MCNC: 273 MG/DL (ref 0–150)

## 2023-05-29 ENCOUNTER — OFFICE VISIT (OUTPATIENT)
Dept: PRIMARY CARE CLINIC | Facility: CLINIC | Age: 58
End: 2023-05-29
Payer: MEDICARE

## 2023-05-29 ENCOUNTER — CLINICAL SUPPORT (OUTPATIENT)
Dept: SMOKING CESSATION | Facility: CLINIC | Age: 58
End: 2023-05-29
Payer: COMMERCIAL

## 2023-05-29 VITALS
DIASTOLIC BLOOD PRESSURE: 78 MMHG | WEIGHT: 257 LBS | HEIGHT: 65 IN | BODY MASS INDEX: 42.82 KG/M2 | HEART RATE: 89 BPM | OXYGEN SATURATION: 99 % | RESPIRATION RATE: 18 BRPM | SYSTOLIC BLOOD PRESSURE: 132 MMHG

## 2023-05-29 DIAGNOSIS — I10 BENIGN ESSENTIAL HTN: Primary | ICD-10-CM

## 2023-05-29 DIAGNOSIS — K21.00 GASTROESOPHAGEAL REFLUX DISEASE WITH ESOPHAGITIS WITHOUT HEMORRHAGE: ICD-10-CM

## 2023-05-29 DIAGNOSIS — E78.2 MIXED HYPERLIPIDEMIA: ICD-10-CM

## 2023-05-29 DIAGNOSIS — E11.49 TYPE 2 DIABETES MELLITUS WITH OTHER NEUROLOGIC COMPLICATION, WITH LONG-TERM CURRENT USE OF INSULIN: Chronic | ICD-10-CM

## 2023-05-29 DIAGNOSIS — Z79.4 TYPE 2 DIABETES MELLITUS WITH OTHER NEUROLOGIC COMPLICATION, WITH LONG-TERM CURRENT USE OF INSULIN: Chronic | ICD-10-CM

## 2023-05-29 DIAGNOSIS — F17.200 NICOTINE DEPENDENCE: Primary | ICD-10-CM

## 2023-05-29 DIAGNOSIS — E66.01 MORBID OBESITY WITH BMI OF 40.0-44.9, ADULT: ICD-10-CM

## 2023-05-29 PROCEDURE — 4010F PR ACE/ARB THEARPY RXD/TAKEN: ICD-10-PCS | Mod: CPTII,S$GLB,, | Performed by: NURSE PRACTITIONER

## 2023-05-29 PROCEDURE — 4010F ACE/ARB THERAPY RXD/TAKEN: CPT | Mod: CPTII,S$GLB,, | Performed by: NURSE PRACTITIONER

## 2023-05-29 PROCEDURE — 3075F PR MOST RECENT SYSTOLIC BLOOD PRESS GE 130-139MM HG: ICD-10-PCS | Mod: CPTII,S$GLB,, | Performed by: NURSE PRACTITIONER

## 2023-05-29 PROCEDURE — 99404 PREV MED CNSL INDIV APPRX 60: CPT | Mod: S$GLB,,, | Performed by: GENERAL PRACTICE

## 2023-05-29 PROCEDURE — 99214 OFFICE O/P EST MOD 30 MIN: CPT | Mod: S$GLB,,, | Performed by: NURSE PRACTITIONER

## 2023-05-29 PROCEDURE — 3078F DIAST BP <80 MM HG: CPT | Mod: CPTII,S$GLB,, | Performed by: NURSE PRACTITIONER

## 2023-05-29 PROCEDURE — 3078F PR MOST RECENT DIASTOLIC BLOOD PRESSURE < 80 MM HG: ICD-10-PCS | Mod: CPTII,S$GLB,, | Performed by: NURSE PRACTITIONER

## 2023-05-29 PROCEDURE — 3008F BODY MASS INDEX DOCD: CPT | Mod: CPTII,S$GLB,, | Performed by: NURSE PRACTITIONER

## 2023-05-29 PROCEDURE — 3075F SYST BP GE 130 - 139MM HG: CPT | Mod: CPTII,S$GLB,, | Performed by: NURSE PRACTITIONER

## 2023-05-29 PROCEDURE — 99214 PR OFFICE/OUTPT VISIT, EST, LEVL IV, 30-39 MIN: ICD-10-PCS | Mod: S$GLB,,, | Performed by: NURSE PRACTITIONER

## 2023-05-29 PROCEDURE — 3044F HG A1C LEVEL LT 7.0%: CPT | Mod: CPTII,S$GLB,, | Performed by: NURSE PRACTITIONER

## 2023-05-29 PROCEDURE — 99404 PR PREVENT COUNSEL,INDIV,60 MIN: ICD-10-PCS | Mod: S$GLB,,, | Performed by: GENERAL PRACTICE

## 2023-05-29 PROCEDURE — 3008F PR BODY MASS INDEX (BMI) DOCUMENTED: ICD-10-PCS | Mod: CPTII,S$GLB,, | Performed by: NURSE PRACTITIONER

## 2023-05-29 PROCEDURE — 3044F PR MOST RECENT HEMOGLOBIN A1C LEVEL <7.0%: ICD-10-PCS | Mod: CPTII,S$GLB,, | Performed by: NURSE PRACTITIONER

## 2023-05-29 RX ORDER — VARENICLINE TARTRATE 1 MG/1
1 TABLET, FILM COATED ORAL 2 TIMES DAILY
Qty: 56 TABLET | Refills: 0 | Status: SHIPPED | OUTPATIENT
Start: 2023-05-29 | End: 2023-06-13

## 2023-05-29 RX ORDER — VARENICLINE TARTRATE 0.5 MG/1
TABLET, FILM COATED ORAL
Qty: 11 TABLET | Refills: 0 | Status: SHIPPED | OUTPATIENT
Start: 2023-05-29 | End: 2023-06-13

## 2023-05-29 RX ORDER — ACETAMINOPHEN AND CODEINE PHOSPHATE 300; 30 MG/1; MG/1
TABLET ORAL
COMMUNITY
Start: 2023-05-24

## 2023-05-29 NOTE — PATIENT INSTRUCTIONS
"RTC in 3 months for F/U or sooner if needed.    Keep appts with specialists as scheduled.    Patient Education       Diabetes and Diet   The Basics   Written by the doctors and editors at [facility]   Why is diet important in diabetes? -- Diet is important because it is part of diabetes treatment. Many people need to change what they eat and how much they eat to help treat their diabetes. It is important for people to treat their diabetes so that they:  Keep their blood sugar at or near a normal level  Prevent long-term problems, such as heart or kidney problems, that can happen in people with diabetes  Changing your diet can also help treat obesity, high blood pressure, and high cholesterol. These conditions can affect people with diabetes and can lead to future problems, such as heart attacks or strokes.  Who will work with me to change my diet? -- Your doctor or nurse will work with you to make a food plan to change your diet. They might also recommend that you work with a "dietitian." A dietitian is an expert on food and eating.  Do I need to eat at the same times every day? -- When and how often you should eat depends, in part, on the diabetes medicines you take. For example:  People who take about the same amount of insulin at the same time each day (called a "fixed regimen") should eat meals at the same times. This is also true for people who take pills that increase insulin levels, such as sulfonylureas. Eating meals at the same time every day helps prevent low blood sugar.  People who adjust the dose and timing of their insulin each day (called a "flexible regimen") do not always have to eat meals at the same time. That's because they can time their insulin dose for before they plan to eat, and also adjust the dose for how much they plan to eat.  People who take medicines that don't usually cause low blood sugar, such as metformin, don't have to eat meals at the same time every day.  What do I need to think " "about when planning what to eat? -- Our bodies break down the food we eat into small pieces called carbohydrates, proteins, and fats.  When planning what to eat, people with diabetes need to think about:  Carbohydrates (or "carbs") - Carbohydrates, which are sugars that our bodies use for energy, can raise a person's blood sugar level. Your doctor, nurse, or dietitian will tell you how many carbohydrates you should eat at each meal or snack. Foods that have carbohydrates include:  Bread, pasta, and rice  Vegetables and fruits  Dairy foods  Foods and drinks with added sugar  It is best to get your carbohydrates from fruits, vegetables, whole grains, and low-fat milk. It is best to avoid drinks with added sugar, like soda, juices, and sports drinks.   Protein - Your doctor, nurse, or dietitian will tell you how much protein you should eat each day. It is best to eat lean meats, fish, eggs, beans, peas, soy products, nuts, and seeds.  Fats - The type of fat you eat is more important than the amount of fat. "Saturated" and "trans" fats can increase your risk for heart problems, like a heart attack.  Foods that have saturated fats include meat, butter, cheese, and ice cream.  Foods that have trans fats include processed food with "partially hydrogenated oils" on the ingredient list. This may include fried foods, store bought cookies, muffins, pies, and cakes.  "Monounsaturated" and "polyunsaturated" fats are better for you. Foods with these types of fat include fish, avocado, olive oil, and nuts.  Calories - People need to eat a certain amount of calories each day to keep their weight the same. People who are overweight and want to lose weight need to eat fewer calories each day.  Fiber - Eating foods with a lot of fiber can help control a person's blood sugar level. Foods that have a lot of fiber include apples, green beans, peas, beans, lentils, nuts, oatmeal, and whole grains.  Salt - People who have high blood " pressure should not eat foods that contain a lot of salt (also called sodium). People with high blood pressure should also eat healthy foods, such as fruits, vegetables, and low-fat dairy foods.  Alcohol - Having more than 1 drink (for women) or 2 drinks (for men) a day can raise blood sugar levels. Also, drinks that have fruit juice or soda in them can raise blood sugar levels.  What can I do if I need to lose weight? -- If you need to lose weight, you can:  Exercise - Try to get at least 30 minutes of physical activity a day, most days of the week. Even gentle exercise, like walking, is good for your health. Some people with diabetes need to change their medicine dose before they exercise. They might also need to check their blood sugar levels before and after exercising.  Eat fewer calories - Your doctor, nurse, or dietitian can tell you how many calories you should eat each day in order to lose weight.  If you are worried about your weight, size, or shape, talk with your doctor, nurse, or dietitian. They can help you make changes to improve your health.  Can I eat the same foods as my family? -- Yes. You do not need to eat special foods if you have diabetes. You and your family can eat the same foods. Changing your diet is mostly about eating healthy foods and not eating too much.  What are the other parts of diabetes treatment? -- Besides changing your diet, the other parts of diabetes treatment are:  Exercise  Medicines  Some people with diabetes need to learn how to match their diet and exercise with their medicine dose. For example, people who use insulin might need to choose the dose of insulin they give themselves. To choose their dose, they need to think about:  What they plan to eat at the next meal  How much exercise they plan to do  What their blood sugar level is  If the diet and exercise do not match the medicine dose, a person's blood sugar level can get too low or too high. Blood sugar levels that  are too low or too high can cause problems.  All topics are updated as new evidence becomes available and our peer review process is complete.  This topic retrieved from [employer] on: Sep 21, 2021.  Topic 06899 Version 7.0  Release: 29.4.2 - C29.263  © 2021 UpToDate, Inc. and/or its affiliates. All rights reserved.  Consumer Information Use and Disclaimer   This information is not specific medical advice and does not replace information you receive from your health care provider. This is only a brief summary of general information. It does NOT include all information about conditions, illnesses, injuries, tests, procedures, treatments, therapies, discharge instructions or life-style choices that may apply to you. You must talk with your health care provider for complete information about your health and treatment options. This information should not be used to decide whether or not to accept your health care provider's advice, instructions or recommendations. Only your health care provider has the knowledge and training to provide advice that is right for you. The use of this information is governed by the [employer] End User License Agreement, available at https://www.HelloWallet/[email or web address]/solutions/[email or web address]/about/bhargav.The use of [employer] content is governed by the [employer] Terms of Use. ©2021 Up[employer]Date, Inc. All rights reserved.  Copyright   © 2021 Up[employer]Date, Inc. and/or its affiliates. All rights reserved.  Patient Education       Low Cholesterol, Saturated Fat, and Trans Fat Diet   About this topic   Cholesterol, saturated fat, and trans fat are in many foods. These may raise your blood cholesterol levels. If your cholesterol is too high, this can cause health problems in your heart, liver, kidneys, and even your eyes. The key to lowering your risk of heart problems is to lower your bad fat intake.  Saturated fats and trans fats are the bad fats. These fats clog your arteries and raise your bad cholesterol. Saturated  "fats and trans fats are solid fats at room temperature. Saturated fats are animal fats. Trans fats are manmade fats. They add flavor to a lot of packaged foods. Staying away from saturated and trans fats will help your heart.  When you do eat foods with fat, make sure they have the good fats. Monounsaturated and polyunsaturated fats are good fats. These fats help raise your good cholesterol and protect your heart.  General   How to Lower Fat and Cholesterol in Your Diet   Read the labels of the foods you buy from the market to find out how much fat is present. Under 5% of total fat on a label means it is "low fat". Over 20% of total fat on a label means it is high fat.  Eat high fiber foods, like soluble fiber. This type of fiber helps lower cholesterol in the body. Choose oatmeal, fruits (like apples), beans, and nuts to get the most soluble fiber.  Eat foods high in omega-3 fatty acids like gopal seeds, walnuts, salmon, tuna, trout, herring, flaxseed, and soybeans. These foods help keep the heart healthy.  Limit your bad fat and oil intake.  Stay away from butter, stick margarine, shortening, lard, and palm and coconut oil. Pick plant-based spreads instead.  Limit mayonnaise, salad dressings, gravies, and sauces, unless it is made from low-fat ingredients.  Limit chocolate.  Do not eat high-fat processed foods like hot dogs, feliciano, sausage, ham and other luncheon meats high in fat, and some frozen foods. Pick fish, chicken, turkey, and lean meats instead.  Eat more dried beans, lentils, and tofu to get your protein.  Do not eat organ meats, like liver.  Choose nonfat or low-fat milk, yogurt, and cheese.  Use light or fat-free cream cheese and sour cream.  Eat lots of fruits and vegetables.  Pick whole grain breads, cereals, pastas, and rice.  Do not eat snacks that are high in fats like granola, cookies, pies, pastries, doughnuts, and croissants.  Stay away from deep fried foods.  Help When Cooking   Remove the " fat portion of meats and the skin from poultry before cooking.  Bake, broil, grill, poach, or roast poultry, fish, and lean meats.  Drain and throw away the fat that drains out of meat as you cook it.  Try to add little or no fat to foods.  Use olive or canola oil for cooking or baking.  Steam your vegetables.  Use herbs or no-oil marinades to flavor foods.         Who should use this diet?   This diet is for people who are at high risk of getting health problems like heart disease, high blood pressure, diabetes, and others. This diet is also good for all people to follow to keep your heart healthy.  What foods are good to eat?   Foods with good fats are:  Canola, peanut, and olive oil  Safflower, soybean, and corn oil  Walnuts, almonds, cashews, and peanuts  Pumpkin and sunflower seeds  [place] and tuna  Tofu  Soymilk  Avocado  What foods should be limited or avoided?   Stay away from these types of foods that have saturated fats:  Whole fat dairy products like cheese, ice cream, whole milk, and cream  Palm and coconut oils  High-fat meats like beef, lamb, poultry with the skin, feliciano, and sausage  Butter and lard  Stay away from these types of foods that may have trans fat:  Cookies, cakes, candy, doughnuts, baked goods, muffins, pizza dough, and pie crusts that are packaged  Fried foods  Frozen dinners  Chips and crackers  Microwave popcorn  Stick margarine and vegetable shortenings  Helpful tips   To help stay away from saturated fat:  Pick lean cuts of meat  Take the skin off chicken and turkey or pick skinless  Pick low-fat cheese, milk, and ice cream  Use liquid oils when cooking and baking, such as olive oil and canola oil  To help stay away from trans fat:  Look at your labels. Choose foods with 0% trans fat. Read the ingredient list. Avoid foods with partially hydrogenated oil in the ingredient list. This means there is trans fat in the product.  Where can I learn more?   American Heart Association    http://www.heart.org/HEARTORG/[email or web address]-Your-Fats_[facility]_305628_Article.jsp   Last Reviewed Date   2021-10-05  Consumer Information Use and Disclaimer   This information is not specific medical advice and does not replace information you receive from your health care provider. This is only a brief summary of general information. It does NOT include all information about conditions, illnesses, injuries, tests, procedures, treatments, therapies, discharge instructions or life-style choices that may apply to you. You must talk with your health care provider for complete information about your health and treatment options. This information should not be used to decide whether or not to accept your health care providers advice, instructions or recommendations. Only your health care provider has the knowledge and training to provide advice that is right for you.   Copyright   Copyright © 2021 UpToDate, Inc. and its affiliates and/or licensors. All rights reserved.

## 2023-05-29 NOTE — PROGRESS NOTES
Subjective:       Patient ID: Anca Grace is a 57 y.o. female.    Chief Complaint: Follow-up    HPI: Anca is a 57 y.o. female who presents for F/U.    Had labs drawn a week ago by Walt David, will get results.    She will be starting wound care with Dr. Monterroso to her shins due to her skin getting over heated and developing into blisters which bursts with clear fluid. She currently has ace wraps over both shins.    Also with left shoulder and neck pain which are chronic issues. She will be starting PT on this Wednesday at [facility], [place] [facility] 2-3 times per week.    Last labs on  02/09/2023 prior to Endocrinology visit with Walt David NP and her A1C was 6.6 which improved from A1C of 7.0 in November, estimated glucose 143. Thyroid function was normal. Triglycerides were elevated at 276, LDL normal at 69.8.     DM II -  Chronic and uncontrolled on meds, b/p averaging < 130/80, denies s/e or a/r. Followed by Walt David for Endocrinology for her DM Type 2 and Hypothyroidism, last seen two weeks ago with labs drawn which I will get the results. Still takes Toujeo 120 units daily, Mounjaro 15 weekly and takes Xigduo daily. Has Neuropathy and she takes Lyrica for it which is prescribed by Orthopedic. [facility] and all was well. She also has diabetic foot exams annually by Dr. Infante in [place].    Hyperlipidemia - controlled with a STATIN. Denies se/ar. Say she has been trying to eat healthier.    Hypothyroidism - takes levothyroxine 75 mcg daily. Thyroid function was normal on last labs. Denies se/ar to medication.    Followed by Dr. Angel in [place] who is now out here. Had a carpal tunnel surgery, scope to left knee last August and some of her bone was scraped for her arthritis. Follows up monthly for muscle relaxer injections. Elavil increased to 50 mg daily, flexeril, cymbalta and still on Lyrica as well. Last seen on 05/24/23 for left shoulder pain and neck pain  and she was treated with tylenol #3 and flexeril but they only help some. Also followed by Dr. Gallegos for lower back pain and he does back injections.    Current smoker, she is back to smoking 1 ppd and has smoked for 15 years. She is starting back on smoking cessation today. She has tried Wellbutrin, patches and Chantix with no success.    Mammogram done last year in [place], will call and schedule.    Had a Colonoscopy done at [facility] by Dr. Valencia in 2018 with 3 polyps found and removed, told to repeat in 3 years so she is due now. Scheduled in June for a Colonoscopy.    History of Hysterectomy in 2012.    UTD with COVID and FLU vaccines.          Follow-up  Associated symptoms include arthralgias (left shoulder and neck) and neck pain. Pertinent negatives include no abdominal pain, chest pain, chills, congestion, coughing, diaphoresis, fatigue, fever, headaches, nausea, numbness, rash, sore throat, vomiting or weakness.       Past Medical History:   Diagnosis Date    Diabetes mellitus, type 2     GERD (gastroesophageal reflux disease)     Hyperlipidemia     Hypertension        Past Surgical History:   Procedure Laterality Date    CARPAL TUNNEL RELEASE      HYSTERECTOMY         Family History   Problem Relation Age of Onset    Thyroiditis Mother     Heart disease Father     Graves' disease Sister     Heart disease Maternal Grandfather     Cancer Paternal Grandfather        Social History     Tobacco Use    Smoking status: Every Day     Types: Cigarettes    Smokeless tobacco: Never   Substance Use Topics    Alcohol use: Never    Drug use: Never       Patient Active Problem List   Diagnosis    Degenerative disc disease, cervical    Degenerative disc disease, lumbar    Neck pain    Bilateral arm pain    Low back pain radiating to right leg    Morbid obesity with BMI of 40.0-44.9, adult    Hyperlipidemia    Benign essential HTN    Type 2 diabetes mellitus with neurologic complication, with long-term current use of  insulin    Gastroesophageal reflux disease with esophagitis without hemorrhage       Immunization History   Administered Date(s) Administered    COVID-19, MRNA, LN-S, PF (MODERNA FULL 0.5 ML DOSE) 10/30/2021, 11/27/2021    DTaP 1966, 01/18/1967, 03/08/1967, 04/10/1968, 03/03/1971, 08/01/1977    IPV 1966, 01/18/1967, 03/08/1967, 04/10/1968, 04/07/1978    Influenza - Quadrivalent - PF *Preferred* (6 months and older) 10/14/2021    Measles 11/07/1967    Mumps 11/13/1968    Rubella 10/21/1970    Tdap 04/13/1988, 08/06/2004           Review of Systems   Constitutional:  Negative for activity change, appetite change, chills, diaphoresis, fatigue and fever.   HENT:  Negative for congestion, ear pain, sinus pain, sore throat, tinnitus and trouble swallowing.    Eyes:  Negative for visual disturbance.   Respiratory:  Negative for cough, chest tightness, shortness of breath and wheezing.    Cardiovascular:  Negative for chest pain, palpitations and leg swelling.   Gastrointestinal:  Negative for abdominal distention, abdominal pain, blood in stool, constipation, diarrhea, nausea and vomiting.   Endocrine: Negative for polydipsia, polyphagia and polyuria.   Genitourinary:  Negative for decreased urine volume, dysuria, frequency, hematuria and urgency.   Musculoskeletal:  Positive for arthralgias (left shoulder and neck), neck pain and neck stiffness. Negative for back pain and gait problem.   Skin:  Positive for wound (skin breakdown to bilateral shins). Negative for color change and rash.   Neurological:  Negative for dizziness, syncope, weakness, light-headedness, numbness and headaches.   Psychiatric/Behavioral:  Negative for behavioral problems, confusion and dysphoric mood. The patient is not nervous/anxious.    Objective:     Vitals:    05/29/23 1416   BP: 132/78   BP Location: Right arm   Patient Position: Sitting   BP Method: Large (Automatic)   Pulse: 89   Resp: 18   SpO2: 99%   Weight: 116.6 kg (257 lb)  "  Height: 5' 5" (1.651 m)       Physical Exam  Vitals and nursing note reviewed.   Constitutional:       General: She is not in acute distress.     Appearance: Normal appearance. She is obese. She is not diaphoretic.   HENT:      Head: Normocephalic and atraumatic.      Nose: Nose normal.      Mouth/Throat:      Mouth: Mucous membranes are moist.      Pharynx: Oropharynx is clear.   Eyes:      Extraocular Movements: Extraocular movements intact.      Conjunctiva/sclera: Conjunctivae normal.      Pupils: Pupils are equal, round, and reactive to light.   Neck:      Thyroid: No thyromegaly or thyroid tenderness.   Cardiovascular:      Rate and Rhythm: Normal rate and regular rhythm.      Pulses: Normal pulses.      Heart sounds: Normal heart sounds.   Pulmonary:      Effort: Pulmonary effort is normal.      Breath sounds: Normal breath sounds. No stridor. No wheezing or rales.   Abdominal:      General: Bowel sounds are normal. There is no distension.      Palpations: Abdomen is soft.      Tenderness: There is no abdominal tenderness.   Musculoskeletal:         General: Tenderness (left shoulder and left neck) present. Normal range of motion.      Cervical back: Normal range of motion and neck supple.      Right lower leg: No edema.      Left lower leg: No edema.   Lymphadenopathy:      Cervical: No cervical adenopathy.   Skin:     General: Skin is warm and dry.      Capillary Refill: Capillary refill takes less than 2 seconds.      Findings: Erythema (bilateral shins) present. No rash.   Neurological:      Mental Status: She is alert and oriented to person, place, and time.   Psychiatric:         Mood and Affect: Mood and affect normal.         Behavior: Behavior normal. Behavior is cooperative.         Thought Content: Thought content normal.         Judgment: Judgment normal.       Patient Outreach on 03/14/2023   Component Date Value Ref Range Status    Hemoglobin A1C 02/09/2023 6.6 (H)  4.0 - 6.0 % Final    " Glucose 02/09/2023 136 (H)  74 - 106 mg/dL Final    BUN 02/09/2023 17.7  6 - 20 mg/dL Final    Creatinine 02/09/2023 1.0 (H)  0.5 - 0.9 mg/dL Final    AST 02/09/2023 11  0 - 32 U/L Final    ALT 02/09/2023 14  0 - 33 U/L Final    Alkaline Phosphatase 02/09/2023 134 (H)  35 - 105 U/L Final    Calcium 02/09/2023 9.4  8.6 - 10.2 mg/dL Final    Protein, Total 02/09/2023 7.5  6.4 - 8.3 g/dL Final    Albumin 02/09/2023 4.2  3.5 - 5.2 g/dL Final    Total Bilirubin 02/09/2023 0.3  0.0 - 1.2 mg/dL Final    Sodium 02/09/2023 135 (L)  136 - 145 mmol/L Final    Potassium 02/09/2023 4.4  3.5 - 5.1 mmol/L Final    Chloride 02/09/2023 98  98 - 107 mmol/L Final    CO2 02/09/2023 28  22 - 29 mmol/L Final    Globulin 02/09/2023 3.3  1.5 - 4.5 g/dL Final    Albumin/Globulin Ratio 02/09/2023 1.3  1.0 - 2.7 Final    BUN/CREAT RATIO 02/09/2023 18.4  6 - 20 Final    GFR ESTIMATION 02/09/2023 69.44  >60.00 Final    Anion Gap 02/09/2023 9  8 - 17 mmol/L Final    Cholesterol 02/09/2023 164  100 - 200 mg/dL Final    Triglycerides 02/09/2023 276 (H)  0 - 150 mg/dL Final    HDL 02/09/2023 39 (L)  >60 mg/dL Final    LDL Cholesterol 02/09/2023 70  0 - 100 mg/dL Final    LDL/HDL Ratio 02/09/2023 1.8  1.0 - 3.0 Final   Patient Outreach on 03/07/2023   Component Date Value Ref Range Status    BCS Recommendation External 05/31/2018 Repeat mammogram in 1 year   Final         Assessment:      1. Benign essential HTN Continue current regimen   2. Morbid obesity with BMI of 40.0-44.9, adult    3. Type 2 diabetes mellitus with other neurologic complication, with long-term current use of insulin Stable   4. Gastroesophageal reflux disease with esophagitis without hemorrhage    5. Mixed hyperlipidemia          Plan:     Benign essential HTN  Comments:  well controlled    Morbid obesity with BMI of 40.0-44.9, adult  Comments:  Eat a low carb, low sugar diet and exercise regularly    Type 2 diabetes mellitus with other neurologic complication, with long-term  current use of insulin  Comments:  continue medications, Eat a low carb, low sugar diet and exercise regularly, keep scheduled appts with Endocrinology    Gastroesophageal reflux disease with esophagitis without hemorrhage  Comments:  continue medication, avoid large meals and avoid lying down after meals    Mixed hyperlipidemia  Comments:  continue STATIN, eat a low cholesterol diet and exercise regularly         Current Outpatient Medications   Medication Sig Dispense Refill    albuterol (PROVENTIL/VENTOLIN HFA) 90 mcg/actuation inhaler INHALE 2 PUFFS BY MOUTH EVERY EVENING AS NEEDED FOR SHORTNESS OF BREATH OR FOR WHEEZING 6.7 g 2    amitriptyline (ELAVIL) 50 MG tablet Take 1 tablet (50 mg total) by mouth every evening. 90 tablet 1    ammonium lactate (LAC-HYDRIN) 12 % lotion APPLY TO AFFECTED AREA 2 TIMES A DAY thank youmike -)      atorvastatin (LIPITOR) 80 MG tablet Take 80 mg by mouth once daily.      azelastine (ASTELIN) 137 mcg (0.1 %) nasal spray SPRAY 1 SPRAY in nose 2 TIMES A DAY thank you**ynes** :-) 30 mL 1    cholecalciferol, vitamin D3, 1,250 mcg (50,000 unit) capsule TAKE 1 CAPSULE BY MOUTH WEEKLY AS DIRECTED 12 capsule 2    COMBIVENT RESPIMAT  mcg/actuation inhaler INHALE 1 PUFF 4 TIMES A DAY 4 g 0    cyclobenzaprine (FLEXERIL) 10 MG tablet TAKE 1 TABLET BY MOUTH 3 TIMES A DAY AS NEEDED FOR MUSCLE SPASMS MAY MAKE DROWSY      DULoxetine (CYMBALTA) 30 MG capsule TAKE 1 CAPSULE BY MOUTH once DAILY for first week THEN INCREASE to 2 TIMES A DAY AS NEEDED FOR PAIN      estradioL (ESTRACE) 1 MG tablet TAKE 1 TABLET BY MOUTH DAILY 90 tablet 3    fluticasone (VERAMYST) 27.5 mcg/actuation nasal spray 2 sprays by Nasal route.      folic acid (FOLVITE) 1 MG tablet TAKE 1 TABLET BY MOUTH DAILY. 90 tablet 3    levothyroxine (SYNTHROID) 88 MCG tablet Take 1 tablet (88 mcg total) by mouth before breakfast. 90 tablet 1    loratadine (CLARITIN) 10 mg tablet Take 10 mg by mouth.      losartan (COZAAR) 100 MG  "tablet TAKE 1 TABLET BY MOUTH DAILY 90 tablet 3    MOUNJARO 15 mg/0.5 mL PnIj       omeprazole (PRILOSEC) 20 MG capsule TAKE 1 CAPSULE BY MOUTH ONCE DAILY thank you**ynes** :-) 30 capsule 2    potassium chloride SA (K-DUR,KLOR-CON) 20 MEQ tablet TAKE 1 TABLET BY MOUTH once DAILY 30 tablet 2    pregabalin (LYRICA) 300 MG Cap Take 300 mg by mouth 2 (two) times a day.      TOUJEO MAX U-300 SOLOSTAR 300 unit/mL (3 mL) insulin pen INJECT 100 UNITS SUBCUTANEOUS once DAILY      TRUEPLUS PEN NEEDLE 32 gauge x 5/32" Ndle USE DAILY OR AS DIRECTED      XIGDUO XR 5-1,000 mg TBph TAKE 2 TABLETS BY MOUTH EACH MORNING      acetaminophen-codeine 300-30mg (TYLENOL #3) 300-30 mg Tab TAKE 1 TABLET BY MOUTH EVERY 6 HOURS AS NEEDED MAY MAKE DROWSY      furosemide (LASIX) 40 MG tablet TAKE 1 TABLET BY MOUTH DAILY 30 tablet 2    varenicline (CHANTIX) 0.5 MG Tab Take 1 tablet by mouth once a day for 3 days, then take 1 tablet by mouth twice a day for 4 days (Take this bottle first) 11 tablet 0    varenicline (CHANTIX) 1 mg Tab Begin after complete 7 days of varenicline 0.5 mg prescription. Take 1 tablet (1 mg total) by mouth 2 (two) times daily. 56 tablet 0     No current facility-administered medications for this visit.       There are no discontinued medications.    Health Maintenance   Topic Date Due    Hepatitis C Screening  Never done    Foot Exam  Never done    Eye Exam  Never done    TETANUS VACCINE  08/06/2014    Mammogram  05/31/2019    Hemoglobin A1c  08/09/2023    Lipid Panel  02/09/2024    Low Dose Statin  05/29/2024       Patient Instructions   RTC in 3 months for F/U or sooner if needed.    Keep appts with specialists as scheduled.    Patient Education       Diabetes and Diet   The Basics   Written by the doctors and editors at [facility]   Why is diet important in diabetes? -- Diet is important because it is part of diabetes treatment. Many people need to change what they eat and how much they eat to help treat their diabetes. " "It is important for people to treat their diabetes so that they:  Keep their blood sugar at or near a normal level  Prevent long-term problems, such as heart or kidney problems, that can happen in people with diabetes  Changing your diet can also help treat obesity, high blood pressure, and high cholesterol. These conditions can affect people with diabetes and can lead to future problems, such as heart attacks or strokes.  Who will work with me to change my diet? -- Your doctor or nurse will work with you to make a food plan to change your diet. They might also recommend that you work with a "dietitian." A dietitian is an expert on food and eating.  Do I need to eat at the same times every day? -- When and how often you should eat depends, in part, on the diabetes medicines you take. For example:  People who take about the same amount of insulin at the same time each day (called a "fixed regimen") should eat meals at the same times. This is also true for people who take pills that increase insulin levels, such as sulfonylureas. Eating meals at the same time every day helps prevent low blood sugar.  People who adjust the dose and timing of their insulin each day (called a "flexible regimen") do not always have to eat meals at the same time. That's because they can time their insulin dose for before they plan to eat, and also adjust the dose for how much they plan to eat.  People who take medicines that don't usually cause low blood sugar, such as metformin, don't have to eat meals at the same time every day.  What do I need to think about when planning what to eat? -- Our bodies break down the food we eat into small pieces called carbohydrates, proteins, and fats.  When planning what to eat, people with diabetes need to think about:  Carbohydrates (or "carbs") - Carbohydrates, which are sugars that our bodies use for energy, can raise a person's blood sugar level. Your doctor, nurse, or dietitian will tell you how " "many carbohydrates you should eat at each meal or snack. Foods that have carbohydrates include:  Bread, pasta, and rice  Vegetables and fruits  Dairy foods  Foods and drinks with added sugar  It is best to get your carbohydrates from fruits, vegetables, whole grains, and low-fat milk. It is best to avoid drinks with added sugar, like soda, juices, and sports drinks.   Protein - Your doctor, nurse, or dietitian will tell you how much protein you should eat each day. It is best to eat lean meats, fish, eggs, beans, peas, soy products, nuts, and seeds.  Fats - The type of fat you eat is more important than the amount of fat. "Saturated" and "trans" fats can increase your risk for heart problems, like a heart attack.  Foods that have saturated fats include meat, butter, cheese, and ice cream.  Foods that have trans fats include processed food with "partially hydrogenated oils" on the ingredient list. This may include fried foods, store bought cookies, muffins, pies, and cakes.  "Monounsaturated" and "polyunsaturated" fats are better for you. Foods with these types of fat include fish, avocado, olive oil, and nuts.  Calories - People need to eat a certain amount of calories each day to keep their weight the same. People who are overweight and want to lose weight need to eat fewer calories each day.  Fiber - Eating foods with a lot of fiber can help control a person's blood sugar level. Foods that have a lot of fiber include apples, green beans, peas, beans, lentils, nuts, oatmeal, and whole grains.  Salt - People who have high blood pressure should not eat foods that contain a lot of salt (also called sodium). People with high blood pressure should also eat healthy foods, such as fruits, vegetables, and low-fat dairy foods.  Alcohol - Having more than 1 drink (for women) or 2 drinks (for men) a day can raise blood sugar levels. Also, drinks that have fruit juice or soda in them can raise blood sugar levels.  What can I " do if I need to lose weight? -- If you need to lose weight, you can:  Exercise - Try to get at least 30 minutes of physical activity a day, most days of the week. Even gentle exercise, like walking, is good for your health. Some people with diabetes need to change their medicine dose before they exercise. They might also need to check their blood sugar levels before and after exercising.  Eat fewer calories - Your doctor, nurse, or dietitian can tell you how many calories you should eat each day in order to lose weight.  If you are worried about your weight, size, or shape, talk with your doctor, nurse, or dietitian. They can help you make changes to improve your health.  Can I eat the same foods as my family? -- Yes. You do not need to eat special foods if you have diabetes. You and your family can eat the same foods. Changing your diet is mostly about eating healthy foods and not eating too much.  What are the other parts of diabetes treatment? -- Besides changing your diet, the other parts of diabetes treatment are:  Exercise  Medicines  Some people with diabetes need to learn how to match their diet and exercise with their medicine dose. For example, people who use insulin might need to choose the dose of insulin they give themselves. To choose their dose, they need to think about:  What they plan to eat at the next meal  How much exercise they plan to do  What their blood sugar level is  If the diet and exercise do not match the medicine dose, a person's blood sugar level can get too low or too high. Blood sugar levels that are too low or too high can cause problems.  All topics are updated as new evidence becomes available and our peer review process is complete.  This topic retrieved from [employer] on: Sep 21, 2021.  Topic 04383 Version 7.0  Release: 29.4.2 - C29.263  © 2021 UpToDate, Inc. and/or its affiliates. All rights reserved.  Consumer Information Use and Disclaimer   This information is not specific  medical advice and does not replace information you receive from your health care provider. This is only a brief summary of general information. It does NOT include all information about conditions, illnesses, injuries, tests, procedures, treatments, therapies, discharge instructions or life-style choices that may apply to you. You must talk with your health care provider for complete information about your health and treatment options. This information should not be used to decide whether or not to accept your health care provider's advice, instructions or recommendations. Only your health care provider has the knowledge and training to provide advice that is right for you. The use of this information is governed by the [employer] End User License Agreement, available at https://www.[employer]/en/solutions/[employer]/about/bhargav.The use of [employer] content is governed by the [employer] Terms of Use. ©2021 UpToDate, Inc. All rights reserved.  Copyright   © 2021 UpToDate, Inc. and/or its affiliates. All rights reserved.  Patient Education       Low Cholesterol, Saturated Fat, and Trans Fat Diet   About this topic   Cholesterol, saturated fat, and trans fat are in many foods. These may raise your blood cholesterol levels. If your cholesterol is too high, this can cause health problems in your heart, liver, kidneys, and even your eyes. The key to lowering your risk of heart problems is to lower your bad fat intake.  Saturated fats and trans fats are the bad fats. These fats clog your arteries and raise your bad cholesterol. Saturated fats and trans fats are solid fats at room temperature. Saturated fats are animal fats. Trans fats are manmade fats. They add flavor to a lot of packaged foods. Staying away from saturated and trans fats will help your heart.  When you do eat foods with fat, make sure they have the good fats. Monounsaturated and polyunsaturated fats are good fats. These fats help raise your good  "cholesterol and protect your heart.  General   How to Lower Fat and Cholesterol in Your Diet   Read the labels of the foods you buy from the market to find out how much fat is present. Under 5% of total fat on a label means it is "low fat". Over 20% of total fat on a label means it is high fat.  Eat high fiber foods, like soluble fiber. This type of fiber helps lower cholesterol in the body. Choose oatmeal, fruits (like apples), beans, and nuts to get the most soluble fiber.  Eat foods high in omega-3 fatty acids like gopal seeds, walnuts, salmon, tuna, trout, herring, flaxseed, and soybeans. These foods help keep the heart healthy.  Limit your bad fat and oil intake.  Stay away from butter, stick margarine, shortening, lard, and palm and coconut oil. Pick plant-based spreads instead.  Limit mayonnaise, salad dressings, gravies, and sauces, unless it is made from low-fat ingredients.  Limit chocolate.  Do not eat high-fat processed foods like hot dogs, feliciano, sausage, ham and other luncheon meats high in fat, and some frozen foods. Pick fish, chicken, turkey, and lean meats instead.  Eat more dried beans, lentils, and tofu to get your protein.  Do not eat organ meats, like liver.  Choose nonfat or low-fat milk, yogurt, and cheese.  Use light or fat-free cream cheese and sour cream.  Eat lots of fruits and vegetables.  Pick whole grain breads, cereals, pastas, and rice.  Do not eat snacks that are high in fats like granola, cookies, pies, pastries, doughnuts, and croissants.  Stay away from deep fried foods.  Help When Cooking   Remove the fat portion of meats and the skin from poultry before cooking.  Bake, broil, grill, poach, or roast poultry, fish, and lean meats.  Drain and throw away the fat that drains out of meat as you cook it.  Try to add little or no fat to foods.  Use olive or canola oil for cooking or baking.  Steam your vegetables.  Use herbs or no-oil marinades to flavor foods.         Who should use " this diet?   This diet is for people who are at high risk of getting health problems like heart disease, high blood pressure, diabetes, and others. This diet is also good for all people to follow to keep your heart healthy.  What foods are good to eat?   Foods with good fats are:  Canola, peanut, and olive oil  Safflower, soybean, and corn oil  Walnuts, almonds, cashews, and peanuts  Pumpkin and sunflower seeds  [place] and tuna  Tofu  Soymilk  Avocado  What foods should be limited or avoided?   Stay away from these types of foods that have saturated fats:  Whole fat dairy products like cheese, ice cream, whole milk, and cream  Palm and coconut oils  High-fat meats like beef, lamb, poultry with the skin, feliciano, and sausage  Butter and lard  Stay away from these types of foods that may have trans fat:  Cookies, cakes, candy, doughnuts, baked goods, muffins, pizza dough, and pie crusts that are packaged  Fried foods  Frozen dinners  Chips and crackers  Microwave popcorn  Stick margarine and vegetable shortenings  Helpful tips   To help stay away from saturated fat:  Pick lean cuts of meat  Take the skin off chicken and turkey or pick skinless  Pick low-fat cheese, milk, and ice cream  Use liquid oils when cooking and baking, such as olive oil and canola oil  To help stay away from trans fat:  Look at your labels. Choose foods with 0% trans fat. Read the ingredient list. Avoid foods with partially hydrogenated oil in the ingredient list. This means there is trans fat in the product.  Where can I learn more?   American Heart Association   http://www.heart.org/HEARTORG/Conditions/Cholesterol/PreventionTreatmentofHighCholesterol/Know-Your-Fats_[facility]_305628_Article.jsp   Last Reviewed Date   2021-10-05  Consumer Information Use and Disclaimer   This information is not specific medical advice and does not replace information you receive from your health care provider. This is only a brief summary of general information. It  does NOT include all information about conditions, illnesses, injuries, tests, procedures, treatments, therapies, discharge instructions or life-style choices that may apply to you. You must talk with your health care provider for complete information about your health and treatment options. This information should not be used to decide whether or not to accept your health care providers advice, instructions or recommendations. Only your health care provider has the knowledge and training to provide advice that is right for you.   Copyright   Copyright © 2021 Up[employer]. and its affiliates and/or licensors. All rights reserved.      Risks, benefits, and alternatives discussed with patient, Patient verbalized understanding of discussed plan of care. Asked patient if any further questions, answered no.    Future Appointments   Date Time Provider Department Center   6/6/2023  2:00 PM Ellie Braswell RRT [facility]   8/30/2023  9:20 AM CLAIRE Perdue PR[facility]5 [facility] Pulliam NP

## 2023-05-30 DIAGNOSIS — R18.8 OTHER ASCITES: ICD-10-CM

## 2023-05-30 RX ORDER — FUROSEMIDE 40 MG/1
TABLET ORAL
Qty: 30 TABLET | Refills: 2 | Status: SHIPPED | OUTPATIENT
Start: 2023-05-30 | End: 2023-08-30 | Stop reason: SDUPTHER

## 2023-06-06 ENCOUNTER — CLINICAL SUPPORT (OUTPATIENT)
Dept: SMOKING CESSATION | Facility: CLINIC | Age: 58
End: 2023-06-06
Payer: COMMERCIAL

## 2023-06-06 ENCOUNTER — PATIENT OUTREACH (OUTPATIENT)
Dept: ADMINISTRATIVE | Facility: HOSPITAL | Age: 58
End: 2023-06-06
Payer: MEDICARE

## 2023-06-06 DIAGNOSIS — F17.200 NICOTINE DEPENDENCE: Primary | ICD-10-CM

## 2023-06-06 PROCEDURE — 99404 PREV MED CNSL INDIV APPRX 60: CPT | Mod: S$GLB,,, | Performed by: GENERAL PRACTICE

## 2023-06-06 PROCEDURE — 99404 PR PREVENT COUNSEL,INDIV,60 MIN: ICD-10-PCS | Mod: S$GLB,,, | Performed by: GENERAL PRACTICE

## 2023-06-06 NOTE — PROGRESS NOTES
Individual Follow-Up Form    6/6/2023    Quit Date: TBD    Clinical Status of Patient: Outpatient    Length of Service: 60 minutes    Continuing Medication: yes  Chantix     Target Symptoms: Withdrawal and medication side effects. The following were  rated moderate (3) to severe (4) on TCRS:  Moderate (3): None Reported  Severe (4): None Reported    Comments: Spoke with patient at length in clinic regarding tobacco cessation progress. Patient remains on prescribed tobacco cessation medication 1mg Chantix without any negative side effects at this time.  Patient decreased to 10 cigarettes daily.  Patient will cut down to 9 cigarettes a day this week.  Reviewed strategies, cues, and triggers. Introduced the negative impact of tobacco on health, the health advantages of discontinuing the use of tobacco, time line improved health changes after a quit, withdrawal issues to expect from nicotine and habit, and ways to achieve the goal of a quit.  Counselor discussed the 4ds (delay, drink, distract, deep breathing) to address nicotine cravings.  Counselor also suggested that patient utilize jolly ranchers, dum dum lollipops, and toffee candy to help control nicotine cravings.  Patient appeared to be receptive to the information given.  Counselor will continue to motivate patient to be tobacco free.    Diagnosis: F17.200    Next Visit: 1 week

## 2023-06-13 ENCOUNTER — CLINICAL SUPPORT (OUTPATIENT)
Dept: SMOKING CESSATION | Facility: CLINIC | Age: 58
End: 2023-06-13
Payer: COMMERCIAL

## 2023-06-13 DIAGNOSIS — F17.200 NICOTINE DEPENDENCE: Primary | ICD-10-CM

## 2023-06-13 LAB — BCS RECOMMENDATION EXT: NORMAL

## 2023-06-13 PROCEDURE — 99404 PR PREVENT COUNSEL,INDIV,60 MIN: ICD-10-PCS | Mod: S$GLB,,, | Performed by: GENERAL PRACTICE

## 2023-06-13 PROCEDURE — 99404 PREV MED CNSL INDIV APPRX 60: CPT | Mod: S$GLB,,, | Performed by: GENERAL PRACTICE

## 2023-06-13 RX ORDER — VARENICLINE TARTRATE 1 MG/1
1 TABLET, FILM COATED ORAL 2 TIMES DAILY
Qty: 56 TABLET | Refills: 0 | Status: SHIPPED | OUTPATIENT
Start: 2023-06-13 | End: 2023-07-24 | Stop reason: SDUPTHER

## 2023-06-13 NOTE — PROGRESS NOTES
Individual Follow-Up Form    6/13/2023    Quit Date: TBD    Clinical Status of Patient: Outpatient    Length of Service: 60 minutes    Continuing Medication: yes  Chantix     Target Symptoms: Withdrawal and medication side effects. The following were  rated moderate (3) to severe (4) on TCRS:  Moderate (3): None Reported  Severe (4): None Reported    Comments:  Spoke with patient at length in clinic regarding tobacco cessation progress. Patient remains on prescribed tobacco cessation medication 1mg Chantix without any negative side effects at this time.  Patient decreased to 15 cigarettes daily.  Patient will cut down to 13 cigarettes a day this week.  Reviewed strategies, cues, and triggers. Introduced the negative impact of tobacco on health, the health advantages of discontinuing the use of tobacco, time line improved health changes after a quit, withdrawal issues to expect from nicotine and habit, and ways to achieve the goal of a quit.  Counselor discussed the 4ds (delay, drink, distract, deep breathing) to address nicotine cravings.  Counselor suggested that patient set daily smoking limits by limiting the number of cigarettes she has access to daily and filing her remaining pack with short straws counselor provided..  Counselor also suggested that patient utilize jolly ranchers, dum dum lollipops, and toffee candy to help control nicotine cravings.  Counselor submitted refill request for 1mg Chantix.  Patient appeared to be receptive to the information given.  Counselor will continue to motivate patient to be tobacco free.    Diagnosis: F17.200    Next Visit: 1 week

## 2023-06-15 ENCOUNTER — PATIENT OUTREACH (OUTPATIENT)
Dept: ADMINISTRATIVE | Facility: HOSPITAL | Age: 58
End: 2023-06-15
Payer: MEDICARE

## 2023-06-19 DIAGNOSIS — E66.01 MORBID OBESITY WITH BMI OF 40.0-44.9, ADULT: ICD-10-CM

## 2023-06-19 DIAGNOSIS — Z79.4 TYPE 2 DIABETES MELLITUS WITH OTHER NEUROLOGIC COMPLICATION, WITH LONG-TERM CURRENT USE OF INSULIN: Primary | ICD-10-CM

## 2023-06-19 DIAGNOSIS — E11.49 TYPE 2 DIABETES MELLITUS WITH OTHER NEUROLOGIC COMPLICATION, WITH LONG-TERM CURRENT USE OF INSULIN: Primary | ICD-10-CM

## 2023-06-19 DIAGNOSIS — E11.9 TYPE 2 DIABETES MELLITUS WITHOUT COMPLICATION, WITHOUT LONG-TERM CURRENT USE OF INSULIN: ICD-10-CM

## 2023-06-19 RX ORDER — TIRZEPATIDE 15 MG/.5ML
15 INJECTION, SOLUTION SUBCUTANEOUS WEEKLY
Qty: 4 PEN | Refills: 2 | Status: SHIPPED | OUTPATIENT
Start: 2023-06-19

## 2023-06-19 NOTE — TELEPHONE ENCOUNTER
----- Message from Louise Yepez sent at 6/19/2023 10:03 AM CDT -----  Regarding: pharmacy, medication change  Contact: patient  PT is calling regarding her medication MOUNJARO 15 mg/0.5 mL PnIj, she states she has to switch pharmacy's in order to get the meds and needs it to be redirected to [facility] fax number is [phone]

## 2023-06-20 DIAGNOSIS — R92.8 ABNORMAL MAMMOGRAM OF RIGHT BREAST: Primary | ICD-10-CM

## 2023-06-21 ENCOUNTER — TELEPHONE (OUTPATIENT)
Dept: PRIMARY CARE CLINIC | Facility: CLINIC | Age: 58
End: 2023-06-21
Payer: MEDICARE

## 2023-06-21 DIAGNOSIS — Z79.4 TYPE 2 DIABETES MELLITUS WITH OTHER NEUROLOGIC COMPLICATION, WITH LONG-TERM CURRENT USE OF INSULIN: Primary | ICD-10-CM

## 2023-06-21 DIAGNOSIS — E11.49 TYPE 2 DIABETES MELLITUS WITH OTHER NEUROLOGIC COMPLICATION, WITH LONG-TERM CURRENT USE OF INSULIN: Primary | ICD-10-CM

## 2023-06-21 RX ORDER — DULAGLUTIDE 1.5 MG/.5ML
1.5 INJECTION, SOLUTION SUBCUTANEOUS
Qty: 4 PEN | Refills: 2 | Status: SHIPPED | OUTPATIENT
Start: 2023-06-21 | End: 2023-06-22

## 2023-06-21 NOTE — TELEPHONE ENCOUNTER
----- Message from Shirley Galvin MA sent at 6/21/2023  2:13 PM CDT -----  Spoke with Adrianna at [facility] pharmacy and she stated that Shena is on back order and is not expected to be in until late July. She stated pt was previously on trulicity before and would like to see if you can temporarily order trulicity for pt.  ----- Message -----  From: Fay Choudhury  Sent: 6/21/2023   1:26 PM CDT  To: [facility] Staff    Type:  Needs Medical Advice    Who Called: Adrianna w/[facility]   Symptoms (please be specific): -   How long has patient had these symptoms:  -  Pharmacy name and phone #:  -  Would the patient rather a call back or a response via [facility]?    Best Call Back Number: [phone] ext : [phone]  Additional Information: needs to speak w/ nurse about medication

## 2023-06-21 NOTE — TELEPHONE ENCOUNTER
----- Message from Yaima Pulliam NP sent at 6/20/2023  6:31 PM CDT -----  Please notify patient her Mammogram shows an area of concern in her right breast so it is recommended a diagnostic mammogram be done to further evaluate her right breast. No abnormalities were found in the left breast. Diagnostic Mammogram ordered so let her know she will receive a call to schedule it. Thanks.

## 2023-06-22 ENCOUNTER — TELEPHONE (OUTPATIENT)
Dept: PRIMARY CARE CLINIC | Facility: CLINIC | Age: 58
End: 2023-06-22
Payer: MEDICARE

## 2023-06-22 DIAGNOSIS — Z79.4 TYPE 2 DIABETES MELLITUS WITH OTHER NEUROLOGIC COMPLICATION, WITH LONG-TERM CURRENT USE OF INSULIN: Primary | ICD-10-CM

## 2023-06-22 DIAGNOSIS — E11.49 TYPE 2 DIABETES MELLITUS WITH OTHER NEUROLOGIC COMPLICATION, WITH LONG-TERM CURRENT USE OF INSULIN: Primary | ICD-10-CM

## 2023-06-22 RX ORDER — DULAGLUTIDE 4.5 MG/.5ML
4.5 INJECTION, SOLUTION SUBCUTANEOUS
Qty: 4 PEN | Refills: 2 | Status: SHIPPED | OUTPATIENT
Start: 2023-06-22 | End: 2024-01-22 | Stop reason: ALTCHOICE

## 2023-06-22 RX ORDER — DICLOFENAC SODIUM 75 MG/1
75 TABLET, DELAYED RELEASE ORAL 2 TIMES DAILY
COMMUNITY
Start: 2023-06-05

## 2023-06-22 RX ORDER — MELOXICAM 7.5 MG/1
TABLET ORAL
COMMUNITY
Start: 2023-06-19 | End: 2024-01-29 | Stop reason: SDUPTHER

## 2023-06-22 NOTE — TELEPHONE ENCOUNTER
----- Message from Yaima Pulliam NP sent at 6/21/2023  5:30 PM CDT -----  Trulicity ordered.    ----- Message -----  From: Shirley Galvin MA  Sent: 6/21/2023   2:15 PM CDT  To: Yaima Pulliam NP    Spoke with Adrianna at [facility] pharmacy and she stated that Shena is on back order and is not expected to be in until late July. She stated pt was previously on trulicity before and would like to see if you can temporarily order trulicity for pt.  ----- Message -----  From: Fay Choudhury  Sent: 6/21/2023   1:26 PM CDT  To: [facility] Staff    Type:  Needs Medical Advice    Who Called: Adrianna w/[facility]   Symptoms (please be specific): -   How long has patient had these symptoms:  -  Pharmacy name and phone #:  -  Would the patient rather a call back or a response via [facility]?    Best Call Back Number: [phone] ext : [phone]  Additional Information: needs to speak w/ nurse about medication

## 2023-06-22 NOTE — TELEPHONE ENCOUNTER
----- Message from Shirley Galvin MA sent at 6/22/2023 12:54 PM CDT -----  Contact: self    ----- Message -----  From: Joyce Calloway  Sent: 6/22/2023  12:46 PM CDT  To: [facility] Staff    Requesting a call back from Sofia regarding her medication - she was given 1.5 dose, but she usually takes the highest dose available (she thinks a 4). Please call back at [phone]

## 2023-06-27 LAB — CRC RECOMMENDATION EXT: NORMAL

## 2023-06-30 ENCOUNTER — PATIENT OUTREACH (OUTPATIENT)
Dept: ADMINISTRATIVE | Facility: HOSPITAL | Age: 58
End: 2023-06-30
Payer: MEDICARE

## 2023-07-07 LAB — RECOMMENDATION:: NORMAL

## 2023-07-11 ENCOUNTER — TELEPHONE (OUTPATIENT)
Dept: PRIMARY CARE CLINIC | Facility: CLINIC | Age: 58
End: 2023-07-11
Payer: MEDICARE

## 2023-07-11 ENCOUNTER — CLINICAL SUPPORT (OUTPATIENT)
Dept: SMOKING CESSATION | Facility: CLINIC | Age: 58
End: 2023-07-11
Payer: COMMERCIAL

## 2023-07-11 DIAGNOSIS — F17.200 NICOTINE DEPENDENCE: Primary | ICD-10-CM

## 2023-07-11 PROCEDURE — 99404 PREV MED CNSL INDIV APPRX 60: CPT | Mod: S$GLB,,, | Performed by: GENERAL PRACTICE

## 2023-07-11 PROCEDURE — 99404 PR PREVENT COUNSEL,INDIV,60 MIN: ICD-10-PCS | Mod: S$GLB,,, | Performed by: GENERAL PRACTICE

## 2023-07-11 NOTE — TELEPHONE ENCOUNTER
----- Message from Yaima Pulliam NP sent at 7/10/2023  5:53 PM CDT -----  Please notify patient the right breast diagnostic mammogram came back negative. The findings in her right breast were benign calcifications or calcium deposits so will return to routine annual mammogram next year.

## 2023-07-11 NOTE — PROGRESS NOTES
Individual Follow-Up Form    7/11/2023    Quit Date: TBD    Clinical Status of Patient: Outpatient    Length of Service: 60 minutes    Continuing Medication: yes  Chantix     Target Symptoms: Withdrawal and medication side effects. The following were  rated moderate (3) to severe (4) on TCRS:  Moderate (3): None Reported  Severe (4): None Reported    Comments: Spoke with patient at length in clinic regarding tobacco cessation progress. Patient remains on prescribed tobacco cessation medication 1mg Chantix without any negative side effects at this time.  Patient decreased to 6-8 cigarettes daily.  Patient will cut down to 5 cigarettes a day this week.  Reviewed strategies, habitual behavior, high risks situations, understanding urges and cravings, stress and relaxation with open discussion and additional interventions, Introduced lapses, relapses, understanding them and analyzing the situation of a lapse, conflict issues that may be linked to a lapse.  Patient stated that she will use her nicotine lozenges and mints to help address future nicotine cravings.  Counselor suggested that patient set daily smoking limits by limiting the number of cigarettes she has access to daily.  Patient appeared to be receptive to the information given.  Counselor will continue to motivate patient to be tobacco free.    *Created addendum to add additional information to notes.    Diagnosis: F17.200    Next Visit: 1 week

## 2023-07-12 NOTE — PROGRESS NOTES
Manually uploaded and linked Diagnostic Mammogram to . Linked Mammogram Screening from media to

## 2023-07-17 ENCOUNTER — CLINICAL SUPPORT (OUTPATIENT)
Dept: SMOKING CESSATION | Facility: CLINIC | Age: 58
End: 2023-07-17
Payer: COMMERCIAL

## 2023-07-17 DIAGNOSIS — F17.200 NICOTINE DEPENDENCE: Primary | ICD-10-CM

## 2023-07-17 PROCEDURE — 99404 PR PREVENT COUNSEL,INDIV,60 MIN: ICD-10-PCS | Mod: S$GLB,,, | Performed by: GENERAL PRACTICE

## 2023-07-17 PROCEDURE — 99404 PREV MED CNSL INDIV APPRX 60: CPT | Mod: S$GLB,,, | Performed by: GENERAL PRACTICE

## 2023-07-17 NOTE — PROGRESS NOTES
Individual Follow-Up Form    7/17/2023    Quit Date: TBD    Clinical Status of Patient: Outpatient    Length of Service: 60 minutes    Continuing Medication: yes  Chantix     Target Symptoms: Withdrawal and medication side effects. The following were  rated moderate (3) to severe (4) on TCRS:  Moderate (3): None Reported  Severe (4): None Reported    Comments: Spoke with patient at length in clinic regarding tobacco cessation progress. Patient remains on prescribed tobacco cessation medication 1mg Chantix without any negative side effects at this time.  Patient decreased to 6 cigarettes daily.  Patient stated one day she was able to decrease to 3 cigarettes daily.  Patient will cut down to 5 cigarettes a day this week.  Reviewed strategies, habitual behavior, stress, and high risk situations. Introduced stress with addition interventions, SOLVE, relaxation with interventions, nutrition, exercise, weight gain, and the importance of rewarding oneself for accomplishments toward becoming tobacco free. Open discussion of all items with interventions.   Counselor discussed the 4ds (delay, drink, distract, deep breathing) to address nicotine cravings.  Patient encouraged patient to exercise and stay active to help distract her from smoking.  Patient appeared to be receptive to the information given.  Counselor will continue to motivate patient to be tobacco free.    Diagnosis: F17.200    Next Visit: 1 week

## 2023-07-20 DIAGNOSIS — M50.30 DEGENERATIVE DISC DISEASE, CERVICAL: ICD-10-CM

## 2023-07-20 DIAGNOSIS — G89.29 OTHER CHRONIC BACK PAIN: ICD-10-CM

## 2023-07-20 DIAGNOSIS — F41.9 ANXIETY AND DEPRESSION: Primary | ICD-10-CM

## 2023-07-20 DIAGNOSIS — M54.9 OTHER CHRONIC BACK PAIN: ICD-10-CM

## 2023-07-20 DIAGNOSIS — M51.36 DEGENERATIVE DISC DISEASE, LUMBAR: ICD-10-CM

## 2023-07-20 DIAGNOSIS — F32.A ANXIETY AND DEPRESSION: Primary | ICD-10-CM

## 2023-07-20 RX ORDER — DULOXETIN HYDROCHLORIDE 30 MG/1
CAPSULE, DELAYED RELEASE ORAL
Qty: 180 CAPSULE | Refills: 1 | Status: SHIPPED | OUTPATIENT
Start: 2023-07-20 | End: 2024-01-22 | Stop reason: SDUPTHER

## 2023-07-24 ENCOUNTER — CLINICAL SUPPORT (OUTPATIENT)
Dept: SMOKING CESSATION | Facility: CLINIC | Age: 58
End: 2023-07-24
Payer: COMMERCIAL

## 2023-07-24 DIAGNOSIS — F17.200 NICOTINE DEPENDENCE: Primary | ICD-10-CM

## 2023-07-24 PROCEDURE — 99404 PREV MED CNSL INDIV APPRX 60: CPT | Mod: S$GLB,,, | Performed by: GENERAL PRACTICE

## 2023-07-24 PROCEDURE — 99404 PR PREVENT COUNSEL,INDIV,60 MIN: ICD-10-PCS | Mod: S$GLB,,, | Performed by: GENERAL PRACTICE

## 2023-07-24 RX ORDER — VARENICLINE TARTRATE 1 MG/1
1 TABLET, FILM COATED ORAL 2 TIMES DAILY
Qty: 56 TABLET | Refills: 1 | Status: SHIPPED | OUTPATIENT
Start: 2023-07-24 | End: 2023-09-25 | Stop reason: SDUPTHER

## 2023-07-24 NOTE — PROGRESS NOTES
Individual Follow-Up Form    7/24/2023    Quit Date: TBD    Clinical Status of Patient: Outpatient    Length of Service: 60 minutes    Continuing Medication: yes  Chantix     Target Symptoms: Withdrawal and medication side effects. The following were  rated moderate (3) to severe (4) on TCRS:  Moderate (3): None Reported  Severe (4): None Reported    Comments: Spoke with patient at length in clinic regarding tobacco cessation progress. Patient remains on prescribed tobacco cessation medication 1mg Chantix without any negative side effects at this time.  Patient stated that she smoked 2 cigarettes yesterday.  Patient will cut down to 1 cigarette a day this week.  Reviewed strategies, habitual behavior, high risks situations, understanding urges and cravings, stress and relaxation with open discussion and additional interventions, Introduced lapses, relapses, understanding them and analyzing the situation of a lapse, conflict issues that may be linked to a lapse.  Counselor discussed the 4ds (delay, drink, distract, deep breathing) to address nicotine cravings.  Patient stated that she is going to attempt to go 24 hours without a cigarette.  Counselor recommended that patient stay hydrated and eat fruits and vegetables to help decrease nicotine cravings and minimize weight gain.  Counselor submitted a refill request for 1mg Chantix.    Patient appeared to be receptive to the information given.  Counselor will continue to motivate patient to be tobacco free.    Diagnosis: F17.200    Next Visit: 1 week

## 2023-07-31 DIAGNOSIS — K21.00 GASTROESOPHAGEAL REFLUX DISEASE WITH ESOPHAGITIS WITHOUT HEMORRHAGE: ICD-10-CM

## 2023-07-31 RX ORDER — OMEPRAZOLE 20 MG/1
CAPSULE, DELAYED RELEASE ORAL
Qty: 30 CAPSULE | Refills: 2 | Status: SHIPPED | OUTPATIENT
Start: 2023-07-31 | End: 2023-08-15 | Stop reason: SDUPTHER

## 2023-08-15 DIAGNOSIS — K21.00 GASTROESOPHAGEAL REFLUX DISEASE WITH ESOPHAGITIS WITHOUT HEMORRHAGE: ICD-10-CM

## 2023-08-15 DIAGNOSIS — G47.09 OTHER INSOMNIA: ICD-10-CM

## 2023-08-15 RX ORDER — AMITRIPTYLINE HYDROCHLORIDE 50 MG/1
TABLET, FILM COATED ORAL
Qty: 90 TABLET | Refills: 1 | Status: SHIPPED | OUTPATIENT
Start: 2023-08-15 | End: 2024-01-22 | Stop reason: SDUPTHER

## 2023-08-15 RX ORDER — OMEPRAZOLE 20 MG/1
CAPSULE, DELAYED RELEASE ORAL
Qty: 30 CAPSULE | Refills: 2 | Status: SHIPPED | OUTPATIENT
Start: 2023-08-15 | End: 2024-01-22 | Stop reason: ALTCHOICE

## 2023-08-24 ENCOUNTER — TELEPHONE (OUTPATIENT)
Dept: SMOKING CESSATION | Facility: CLINIC | Age: 58
End: 2023-08-24
Payer: MEDICARE

## 2023-08-28 DIAGNOSIS — R06.02 SOB (SHORTNESS OF BREATH): ICD-10-CM

## 2023-08-28 DIAGNOSIS — R06.2 WHEEZING: ICD-10-CM

## 2023-08-28 DIAGNOSIS — J43.8 OTHER EMPHYSEMA: ICD-10-CM

## 2023-08-28 DIAGNOSIS — I10 ESSENTIAL (PRIMARY) HYPERTENSION: ICD-10-CM

## 2023-08-28 RX ORDER — IPRATROPIUM BROMIDE AND ALBUTEROL 20; 100 UG/1; UG/1
SPRAY, METERED RESPIRATORY (INHALATION)
Qty: 4 G | Refills: 2 | OUTPATIENT
Start: 2023-08-28

## 2023-08-28 RX ORDER — ALBUTEROL SULFATE 90 UG/1
AEROSOL, METERED RESPIRATORY (INHALATION)
Qty: 6.7 G | Refills: 2 | Status: SHIPPED | OUTPATIENT
Start: 2023-08-28 | End: 2024-01-29 | Stop reason: SDUPTHER

## 2023-08-28 RX ORDER — LOSARTAN POTASSIUM 100 MG/1
100 TABLET ORAL DAILY
Qty: 90 TABLET | Refills: 3 | Status: SHIPPED | OUTPATIENT
Start: 2023-08-28 | End: 2024-01-29 | Stop reason: SDUPTHER

## 2023-08-28 NOTE — TELEPHONE ENCOUNTER
----- Message from Hyacinth Burch sent at 8/28/2023  1:11 PM CDT -----  Contact: self  Pt is calling in regards to refill and is asking if a nurse can give her a call when it is sent to local pharmacy for her to send someone to get it . It would be the losartan (COZAAR) 100 MG [facility] . Please call pt at [phone]

## 2023-08-30 ENCOUNTER — OFFICE VISIT (OUTPATIENT)
Dept: PRIMARY CARE CLINIC | Facility: CLINIC | Age: 58
End: 2023-08-30
Payer: MEDICARE

## 2023-08-30 VITALS
RESPIRATION RATE: 18 BRPM | OXYGEN SATURATION: 98 % | DIASTOLIC BLOOD PRESSURE: 76 MMHG | HEIGHT: 65 IN | SYSTOLIC BLOOD PRESSURE: 138 MMHG | WEIGHT: 250 LBS | BODY MASS INDEX: 41.65 KG/M2 | HEART RATE: 73 BPM

## 2023-08-30 DIAGNOSIS — E78.2 MIXED HYPERLIPIDEMIA: ICD-10-CM

## 2023-08-30 DIAGNOSIS — E03.8 OTHER SPECIFIED HYPOTHYROIDISM: ICD-10-CM

## 2023-08-30 DIAGNOSIS — J43.8 OTHER EMPHYSEMA: Chronic | ICD-10-CM

## 2023-08-30 DIAGNOSIS — M51.36 DEGENERATIVE DISC DISEASE, LUMBAR: ICD-10-CM

## 2023-08-30 DIAGNOSIS — E66.01 MORBID OBESITY WITH BMI OF 40.0-44.9, ADULT: ICD-10-CM

## 2023-08-30 DIAGNOSIS — E28.39 OTHER PRIMARY OVARIAN FAILURE: ICD-10-CM

## 2023-08-30 DIAGNOSIS — Z79.4 TYPE 2 DIABETES MELLITUS WITH OTHER NEUROLOGIC COMPLICATION, WITH LONG-TERM CURRENT USE OF INSULIN: ICD-10-CM

## 2023-08-30 DIAGNOSIS — M50.30 DEGENERATIVE DISC DISEASE, CERVICAL: Chronic | ICD-10-CM

## 2023-08-30 DIAGNOSIS — R18.8 OTHER ASCITES: ICD-10-CM

## 2023-08-30 DIAGNOSIS — E11.49 TYPE 2 DIABETES MELLITUS WITH OTHER NEUROLOGIC COMPLICATION, WITH LONG-TERM CURRENT USE OF INSULIN: ICD-10-CM

## 2023-08-30 DIAGNOSIS — K21.00 GASTROESOPHAGEAL REFLUX DISEASE WITH ESOPHAGITIS WITHOUT HEMORRHAGE: ICD-10-CM

## 2023-08-30 DIAGNOSIS — I10 BENIGN ESSENTIAL HTN: Primary | ICD-10-CM

## 2023-08-30 PROCEDURE — 3051F HG A1C>EQUAL 7.0%<8.0%: CPT | Mod: CPTII,S$GLB,, | Performed by: NURSE PRACTITIONER

## 2023-08-30 PROCEDURE — 3075F SYST BP GE 130 - 139MM HG: CPT | Mod: CPTII,S$GLB,, | Performed by: NURSE PRACTITIONER

## 2023-08-30 PROCEDURE — 4010F PR ACE/ARB THEARPY RXD/TAKEN: ICD-10-PCS | Mod: CPTII,S$GLB,, | Performed by: NURSE PRACTITIONER

## 2023-08-30 PROCEDURE — 3051F PR MOST RECENT HEMOGLOBIN A1C LEVEL 7.0 - < 8.0%: ICD-10-PCS | Mod: CPTII,S$GLB,, | Performed by: NURSE PRACTITIONER

## 2023-08-30 PROCEDURE — 4010F ACE/ARB THERAPY RXD/TAKEN: CPT | Mod: CPTII,S$GLB,, | Performed by: NURSE PRACTITIONER

## 2023-08-30 PROCEDURE — 99214 PR OFFICE/OUTPT VISIT, EST, LEVL IV, 30-39 MIN: ICD-10-PCS | Mod: S$GLB,,, | Performed by: NURSE PRACTITIONER

## 2023-08-30 PROCEDURE — 3078F PR MOST RECENT DIASTOLIC BLOOD PRESSURE < 80 MM HG: ICD-10-PCS | Mod: CPTII,S$GLB,, | Performed by: NURSE PRACTITIONER

## 2023-08-30 PROCEDURE — 3008F BODY MASS INDEX DOCD: CPT | Mod: CPTII,S$GLB,, | Performed by: NURSE PRACTITIONER

## 2023-08-30 PROCEDURE — 3075F PR MOST RECENT SYSTOLIC BLOOD PRESS GE 130-139MM HG: ICD-10-PCS | Mod: CPTII,S$GLB,, | Performed by: NURSE PRACTITIONER

## 2023-08-30 PROCEDURE — 3078F DIAST BP <80 MM HG: CPT | Mod: CPTII,S$GLB,, | Performed by: NURSE PRACTITIONER

## 2023-08-30 PROCEDURE — 1160F RVW MEDS BY RX/DR IN RCRD: CPT | Mod: CPTII,S$GLB,, | Performed by: NURSE PRACTITIONER

## 2023-08-30 PROCEDURE — 1159F PR MEDICATION LIST DOCUMENTED IN MEDICAL RECORD: ICD-10-PCS | Mod: CPTII,S$GLB,, | Performed by: NURSE PRACTITIONER

## 2023-08-30 PROCEDURE — 3008F PR BODY MASS INDEX (BMI) DOCUMENTED: ICD-10-PCS | Mod: CPTII,S$GLB,, | Performed by: NURSE PRACTITIONER

## 2023-08-30 PROCEDURE — 99214 OFFICE O/P EST MOD 30 MIN: CPT | Mod: S$GLB,,, | Performed by: NURSE PRACTITIONER

## 2023-08-30 PROCEDURE — 1159F MED LIST DOCD IN RCRD: CPT | Mod: CPTII,S$GLB,, | Performed by: NURSE PRACTITIONER

## 2023-08-30 PROCEDURE — 1160F PR REVIEW ALL MEDS BY PRESCRIBER/CLIN PHARMACIST DOCUMENTED: ICD-10-PCS | Mod: CPTII,S$GLB,, | Performed by: NURSE PRACTITIONER

## 2023-08-30 RX ORDER — ATORVASTATIN CALCIUM 80 MG/1
80 TABLET, FILM COATED ORAL NIGHTLY
Qty: 90 TABLET | Refills: 3 | Status: SHIPPED | OUTPATIENT
Start: 2023-08-30 | End: 2024-01-29 | Stop reason: SDUPTHER

## 2023-08-30 RX ORDER — ESTRADIOL 1 MG/1
1 TABLET ORAL DAILY
Qty: 90 TABLET | Refills: 3 | Status: SHIPPED | OUTPATIENT
Start: 2023-08-30 | End: 2024-01-29 | Stop reason: SDUPTHER

## 2023-08-30 RX ORDER — IPRATROPIUM BROMIDE AND ALBUTEROL 20; 100 UG/1; UG/1
SPRAY, METERED RESPIRATORY (INHALATION)
Qty: 4 G | Refills: 3 | Status: SHIPPED | OUTPATIENT
Start: 2023-08-30 | End: 2024-01-29 | Stop reason: SDUPTHER

## 2023-08-30 RX ORDER — LEVOTHYROXINE SODIUM 88 UG/1
88 TABLET ORAL
Qty: 90 TABLET | Refills: 1 | Status: SHIPPED | OUTPATIENT
Start: 2023-08-30 | End: 2024-01-22 | Stop reason: SDUPTHER

## 2023-08-30 RX ORDER — FUROSEMIDE 40 MG/1
40 TABLET ORAL DAILY
Qty: 90 TABLET | Refills: 1 | Status: SHIPPED | OUTPATIENT
Start: 2023-08-30 | End: 2024-01-22 | Stop reason: SDUPTHER

## 2023-08-30 NOTE — PATIENT INSTRUCTIONS
"RTC in 3 months for F/U or sooner if needed.    Keep appts with specialists as scheduled.    Patient Education       Low Cholesterol, Saturated Fat, and Trans Fat Diet   About this topic   Cholesterol, saturated fat, and trans fat are in many foods. These may raise your blood cholesterol levels. If your cholesterol is too high, this can cause health problems in your heart, liver, kidneys, and even your eyes. The key to lowering your risk of heart problems is to lower your bad fat intake.  Saturated fats and trans fats are the bad fats. These fats clog your arteries and raise your bad cholesterol. Saturated fats and trans fats are solid fats at room temperature. Saturated fats are animal fats. Trans fats are manmade fats. They add flavor to a lot of packaged foods. Staying away from saturated and trans fats will help your heart.  When you do eat foods with fat, make sure they have the good fats. Monounsaturated and polyunsaturated fats are good fats. These fats help raise your good cholesterol and protect your heart.  General   How to Lower Fat and Cholesterol in Your Diet   Read the labels of the foods you buy from the market to find out how much fat is present. Under 5% of total fat on a label means it is "low fat". Over 20% of total fat on a label means it is high fat.  Eat high fiber foods, like soluble fiber. This type of fiber helps lower cholesterol in the body. Choose oatmeal, fruits (like apples), beans, and nuts to get the most soluble fiber.  Eat foods high in omega-3 fatty acids like gopal seeds, walnuts, salmon, tuna, trout, herring, flaxseed, and soybeans. These foods help keep the heart healthy.  Limit your bad fat and oil intake.  Stay away from butter, stick margarine, shortening, lard, and palm and coconut oil. Pick plant-based spreads instead.  Limit mayonnaise, salad dressings, gravies, and sauces, unless it is made from low-fat ingredients.  Limit chocolate.  Do not eat high-fat processed foods " like hot dogs, feliciano, sausage, ham and other luncheon meats high in fat, and some frozen foods. Pick fish, chicken, turkey, and lean meats instead.  Eat more dried beans, lentils, and tofu to get your protein.  Do not eat organ meats, like liver.  Choose nonfat or low-fat milk, yogurt, and cheese.  Use light or fat-free cream cheese and sour cream.  Eat lots of fruits and vegetables.  Pick whole grain breads, cereals, pastas, and rice.  Do not eat snacks that are high in fats like granola, cookies, pies, pastries, doughnuts, and croissants.  Stay away from deep fried foods.  Help When Cooking   Remove the fat portion of meats and the skin from poultry before cooking.  Bake, broil, grill, poach, or roast poultry, fish, and lean meats.  Drain and throw away the fat that drains out of meat as you cook it.  Try to add little or no fat to foods.  Use olive or canola oil for cooking or baking.  Steam your vegetables.  Use herbs or no-oil marinades to flavor foods.         Who should use this diet?   This diet is for people who are at high risk of getting health problems like heart disease, high blood pressure, diabetes, and others. This diet is also good for all people to follow to keep your heart healthy.  What foods are good to eat?   Foods with good fats are:  Canola, peanut, and olive oil  Safflower, soybean, and corn oil  Walnuts, almonds, cashews, and peanuts  Pumpkin and sunflower seeds  [place] and tuna  Tofu  Soymilk  Avocado  What foods should be limited or avoided?   Stay away from these types of foods that have saturated fats:  Whole fat dairy products like cheese, ice cream, whole milk, and cream  Palm and coconut oils  High-fat meats like beef, lamb, poultry with the skin, feliciano, and sausage  Butter and lard  Stay away from these types of foods that may have trans fat:  Cookies, cakes, candy, doughnuts, baked goods, muffins, pizza dough, and pie crusts that are packaged  Fried foods  Frozen dinners  Chips and  crackers  Microwave popcorn  Stick margarine and vegetable shortenings  Helpful tips   To help stay away from saturated fat:  Pick lean cuts of meat  Take the skin off chicken and turkey or pick skinless  Pick low-fat cheese, milk, and ice cream  Use liquid oils when cooking and baking, such as olive oil and canola oil  To help stay away from trans fat:  Look at your labels. Choose foods with 0% trans fat. Read the ingredient list. Avoid foods with partially hydrogenated oil in the ingredient list. This means there is trans fat in the product.  Where can I learn more?   American Heart Association   [email or web address]   Last Reviewed Date   2021-10-05  Consumer Information Use and Disclaimer   This information is not specific medical advice and does not replace information you receive from your health care provider. This is only a brief summary of general information. It does NOT include all information about conditions, illnesses, injuries, tests, procedures, treatments, therapies, discharge instructions or life-style choices that may apply to you. You must talk with your health care provider for complete information about your health and treatment options. This information should not be used to decide whether or not to accept your health care providers advice, instructions or recommendations. Only your health care provider has the knowledge and training to provide advice that is right for you.   Copyright   Copyright © 2021 Up[employer] and its affiliates and/or licensors. All rights reserved.  Patient Education       Diabetes and Diet   The Basics   Written by the doctors and editors at [employer]   Why is diet important in diabetes? -- Diet is important because it is part of diabetes treatment. Many people need to change what they eat and how much they eat to help treat their diabetes. It is important for  "people to treat their diabetes so that they:  Keep their blood sugar at or near a normal level  Prevent long-term problems, such as heart or kidney problems, that can happen in people with diabetes  Changing your diet can also help treat obesity, high blood pressure, and high cholesterol. These conditions can affect people with diabetes and can lead to future problems, such as heart attacks or strokes.  Who will work with me to change my diet? -- Your doctor or nurse will work with you to make a food plan to change your diet. They might also recommend that you work with a "dietitian." A dietitian is an expert on food and eating.  Do I need to eat at the same times every day? -- When and how often you should eat depends, in part, on the diabetes medicines you take. For example:  People who take about the same amount of insulin at the same time each day (called a "fixed regimen") should eat meals at the same times. This is also true for people who take pills that increase insulin levels, such as sulfonylureas. Eating meals at the same time every day helps prevent low blood sugar.  People who adjust the dose and timing of their insulin each day (called a "flexible regimen") do not always have to eat meals at the same time. That's because they can time their insulin dose for before they plan to eat, and also adjust the dose for how much they plan to eat.  People who take medicines that don't usually cause low blood sugar, such as metformin, don't have to eat meals at the same time every day.  What do I need to think about when planning what to eat? -- Our bodies break down the food we eat into small pieces called carbohydrates, proteins, and fats.  When planning what to eat, people with diabetes need to think about:  Carbohydrates (or "carbs") - Carbohydrates, which are sugars that our bodies use for energy, can raise a person's blood sugar level. Your doctor, nurse, or dietitian will tell you how many carbohydrates you " "should eat at each meal or snack. Foods that have carbohydrates include:  Bread, pasta, and rice  Vegetables and fruits  Dairy foods  Foods and drinks with added sugar  It is best to get your carbohydrates from fruits, vegetables, whole grains, and low-fat milk. It is best to avoid drinks with added sugar, like soda, juices, and sports drinks.   Protein - Your doctor, nurse, or dietitian will tell you how much protein you should eat each day. It is best to eat lean meats, fish, eggs, beans, peas, soy products, nuts, and seeds.  Fats - The type of fat you eat is more important than the amount of fat. "Saturated" and "trans" fats can increase your risk for heart problems, like a heart attack.  Foods that have saturated fats include meat, butter, cheese, and ice cream.  Foods that have trans fats include processed food with "partially hydrogenated oils" on the ingredient list. This may include fried foods, store bought cookies, muffins, pies, and cakes.  "Monounsaturated" and "polyunsaturated" fats are better for you. Foods with these types of fat include fish, avocado, olive oil, and nuts.  Calories - People need to eat a certain amount of calories each day to keep their weight the same. People who are overweight and want to lose weight need to eat fewer calories each day.  Fiber - Eating foods with a lot of fiber can help control a person's blood sugar level. Foods that have a lot of fiber include apples, green beans, peas, beans, lentils, nuts, oatmeal, and whole grains.  Salt - People who have high blood pressure should not eat foods that contain a lot of salt (also called sodium). People with high blood pressure should also eat healthy foods, such as fruits, vegetables, and low-fat dairy foods.  Alcohol - Having more than 1 drink (for women) or 2 drinks (for men) a day can raise blood sugar levels. Also, drinks that have fruit juice or soda in them can raise blood sugar levels.  What can I do if I need to lose " weight? -- If you need to lose weight, you can:  Exercise - Try to get at least 30 minutes of physical activity a day, most days of the week. Even gentle exercise, like walking, is good for your health. Some people with diabetes need to change their medicine dose before they exercise. They might also need to check their blood sugar levels before and after exercising.  Eat fewer calories - Your doctor, nurse, or dietitian can tell you how many calories you should eat each day in order to lose weight.  If you are worried about your weight, size, or shape, talk with your doctor, nurse, or dietitian. They can help you make changes to improve your health.  Can I eat the same foods as my family? -- Yes. You do not need to eat special foods if you have diabetes. You and your family can eat the same foods. Changing your diet is mostly about eating healthy foods and not eating too much.  What are the other parts of diabetes treatment? -- Besides changing your diet, the other parts of diabetes treatment are:  Exercise  Medicines  Some people with diabetes need to learn how to match their diet and exercise with their medicine dose. For example, people who use insulin might need to choose the dose of insulin they give themselves. To choose their dose, they need to think about:  What they plan to eat at the next meal  How much exercise they plan to do  What their blood sugar level is  If the diet and exercise do not match the medicine dose, a person's blood sugar level can get too low or too high. Blood sugar levels that are too low or too high can cause problems.  All topics are updated as new evidence becomes available and our peer review process is complete.  This topic retrieved from [employer] on: Sep 21, 2021.  Topic 64956 Version 7.0  Release: 29.4.2 - C29.263  © 2021 UpToDate, Inc. and/or its affiliates. All rights reserved.  Consumer Information Use and Disclaimer   This information is not specific medical advice and does  not replace information you receive from your health care provider. This is only a brief summary of general information. It does NOT include all information about conditions, illnesses, injuries, tests, procedures, treatments, therapies, discharge instructions or life-style choices that may apply to you. You must talk with your health care provider for complete information about your health and treatment options. This information should not be used to decide whether or not to accept your health care provider's advice, instructions or recommendations. Only your health care provider has the knowledge and training to provide advice that is right for you. The use of this information is governed by the [employer] End User License Agreement, available at [email or web address]://[email or web address]/bhargav.The use of [employer] content is governed by the [employer] Terms of Use. ©2021 Up[employer]Date, Inc. All rights reserved.  Copyright   © 2021 Up[employer]Date, Inc. and/or its affiliates. All rights reserved.

## 2023-08-30 NOTE — PROGRESS NOTES
Subjective:       Patient ID: Anca Grace is a 57 y.o. female.    Chief Complaint: Follow-up    HPI: Anca is a 57 y.o. female who presents for F/U.    Will have labs done a week before she goes to see Walt David in October with Endocrinology.    She is doing wound care with Dr. Monterroso to her shins due to her skin getting over heated and developing into blisters which bursts with clear fluid twice weekly and it is helping. She currently has ace wraps over both shins.    She completed PT for her left shoulder and neck pain which are chronic issues at [facility] which she has completed and it helped. She also did receive an injection to her left shoulder and upper back a couple weeks ago by Dr. Fernandez and she tolerated them well.     Last labs her A1C was 7.4. Thyroid function was normal. Triglycerides were elevated at 273, LDL normal at  26.     DM II -  Chronic and uncontrolled on meds, b/p averaging < 130/80, denies s/e or a/r. Followed by Walt David for Endocrinology for her DM Type 2 and Hypothyroidism. Still takes Toujeo 120 units daily, Trulicity 4.5 weekly and takes Xigduo daily. Has Neuropathy and she takes Lyrica for it which is prescribed by Orthopedic. [facility] and all was well. She also has diabetic foot exams annually by Dr. Infnate in [place].    Hyperlipidemia - controlled with a STATIN. Denies se/ar. Say she has been trying to eat healthier.    Hypothyroidism - takes levothyroxine 75 mcg daily. Thyroid function was normal on last labs. Denies se/ar to medication.    Followed by Dr. Angel in [place] who is now out here. Had a carpal tunnel surgery, scope to left knee last August and some of her bone was scraped for her arthritis. Follows up monthly for muscle relaxer injections. Elavil increased to 50 mg daily, flexeril, cymbalta and still on Lyrica as well. Last seen on 05/24/23 for left shoulder pain and neck pain and she was treated  with tylenol #3 and flexeril but they only help some. Also followed by Dr. Gallegos for lower back pain and he does back injections.    Current smoker, she has cut down to smoking 1-2 cigarettes daily after smoking 1 ppd and has smoked for 15 years. She is back on smoking cessation which she is prescribed Chantix which has helped.    Mammogram UTD, F/U right breast diagnostic mammogram came back negative. The findings in her right breast were benign calcifications or calcium deposits so will return to routine annual mammogram next year.     Had a Colonoscopy done at [facility] by Dr. Valencia in 2018 with 3 polyps found and removed, told to repeat in 3 years so she had it done in June with one polyp found but due to her not being fully cleaned out, he will repeat in 1 year.   History of Hysterectomy in 2012.    UTD with COVID and FLU vaccines.          Follow-up  Associated symptoms include arthralgias (left shoulder and neck) and neck pain. Pertinent negatives include no abdominal pain, chest pain, chills, coughing, diaphoresis, fatigue, fever, headaches, numbness, rash or weakness.         Past Medical History:   Diagnosis Date    Diabetes mellitus, type 2     GERD (gastroesophageal reflux disease)     Hyperlipidemia     Hypertension        Past Surgical History:   Procedure Laterality Date    CARPAL TUNNEL RELEASE      HYSTERECTOMY         Family History   Problem Relation Age of Onset    Thyroiditis Mother     Heart disease Father     Graves' disease Sister     Heart disease Maternal Grandfather     Cancer Paternal Grandfather        Social History     Tobacco Use    Smoking status: Every Day     Types: Cigarettes    Smokeless tobacco: Never   Substance Use Topics    Alcohol use: Never    Drug use: Never       Patient Active Problem List   Diagnosis    Degenerative disc disease, cervical    Degenerative disc disease, lumbar    Neck pain    Bilateral arm pain    Low back pain radiating to right leg    Morbid obesity  "with BMI of 40.0-44.9, adult    Hyperlipidemia    Benign essential HTN    Type 2 diabetes mellitus with neurologic complication, with long-term current use of insulin    Gastroesophageal reflux disease with esophagitis without hemorrhage       Immunization History   Administered Date(s) Administered    COVID-19, MRNA, LN-S, PF (MODERNA FULL 0.5 ML DOSE) 10/30/2021, 11/27/2021    DTaP 1966, 01/18/1967, 03/08/1967, 04/10/1968, 03/03/1971, 08/01/1977    IPV 1966, 01/18/1967, 03/08/1967, 04/10/1968, 04/07/1978    Influenza - Quadrivalent - PF *Preferred* (6 months and older) 10/14/2021    Measles 11/07/1967    Mumps 11/13/1968    Rubella 10/21/1970    Tdap 04/13/1988, 08/06/2004           Review of Systems   Constitutional:  Negative for activity change, appetite change, chills, diaphoresis, fatigue and fever.   HENT:  Negative for tinnitus and trouble swallowing.    Eyes:  Negative for visual disturbance.   Respiratory:  Negative for cough, chest tightness, shortness of breath and wheezing.    Cardiovascular:  Negative for chest pain, palpitations and leg swelling.   Gastrointestinal:  Negative for abdominal distention and abdominal pain.   Endocrine: Negative for cold intolerance, heat intolerance, polydipsia, polyphagia and polyuria.   Genitourinary:  Negative for decreased urine volume, frequency and urgency.   Musculoskeletal:  Positive for arthralgias (left shoulder and neck) and neck pain.   Skin:  Negative for rash.        Excoriated skin to shins, attends wound care   Neurological:  Negative for syncope, weakness, numbness and headaches.   Psychiatric/Behavioral:  Negative for behavioral problems and dysphoric mood. The patient is not nervous/anxious.      Objective:     Vitals:    08/30/23 0946   BP: 138/76   BP Location: Right forearm   Patient Position: Sitting   BP Method: Medium (Automatic)   Pulse: 73   Resp: 18   SpO2: 98%   Weight: 113.4 kg (250 lb)   Height: 5' 5" (1.651 m)       Physical " Exam  Vitals and nursing note reviewed.   Constitutional:       General: She is not in acute distress.     Appearance: Normal appearance. She is not diaphoretic.   HENT:      Head: Normocephalic and atraumatic.      Mouth/Throat:      Mouth: Mucous membranes are moist.      Pharynx: Oropharynx is clear.   Eyes:      Extraocular Movements: Extraocular movements intact.      Conjunctiva/sclera: Conjunctivae normal.   Neck:      Thyroid: No thyromegaly or thyroid tenderness.   Cardiovascular:      Rate and Rhythm: Normal rate and regular rhythm.      Pulses: Normal pulses.      Heart sounds: Normal heart sounds.   Pulmonary:      Effort: Pulmonary effort is normal.      Breath sounds: Normal breath sounds. No rales.   Abdominal:      General: There is no distension.      Tenderness: There is no abdominal tenderness.   Musculoskeletal:         General: Tenderness (multiple joints) present. Normal range of motion.      Cervical back: Normal range of motion and neck supple.      Right lower leg: No edema.      Left lower leg: No edema.   Lymphadenopathy:      Cervical: No cervical adenopathy.   Skin:     General: Skin is warm and dry.      Capillary Refill: Capillary refill takes less than 2 seconds.      Findings: Erythema (bilateral shins with skin breakdown noted) present.   Neurological:      Mental Status: She is alert and oriented to person, place, and time.      Sensory: Sensation is intact.      Motor: Motor function is intact.      Coordination: Coordination is intact.      Gait: Gait is intact.   Psychiatric:         Mood and Affect: Mood and affect normal.         Behavior: Behavior normal. Behavior is cooperative.         Thought Content: Thought content normal.         Judgment: Judgment normal.         Patient Outreach on 06/30/2023   Component Date Value Ref Range Status    CRC Recommendation External 06/27/2023 Repeat colonoscopy in 1 year   Final-Edited   Patient Outreach on 06/15/2023   Component Date  Value Ref Range Status    BCS Recommendation External 06/13/2023 Repeat mammogram in 1 year   Final    Recommendation: 07/07/2023 Repeat mammogram in one year   Final   Patient Outreach on 06/06/2023   Component Date Value Ref Range Status    Hemoglobin A1C 05/24/2023 7.4 (H)  4.0 - 6.0 % Final    Glucose 05/24/2023 152 (H)  74 - 106 mg/dL Final    BUN 05/24/2023 22.8 (H)  6 - 20 mg/dL Final    Creatinine 05/24/2023 1.3 (H)  0.5 - 0.9 mg/dL Final    AST 05/24/2023 10  0 - 32 U/L Final    ALT 05/24/2023 13  0 - 33 U/L Final    Alkaline Phosphatase 05/24/2023 116 (H)  35 - 105 U/L Final    Calcium 05/24/2023 9.5  8.6 - 10.2 mg/dL Final    Protein, Total 05/24/2023 6.5  6.4 - 8.3 Final    Albumin 05/24/2023 3.6  3.5 - 5.2 g/dL Final    Total Bilirubin 05/24/2023 0.4  0.0 - 1.2 mg/dL Final    Sodium 05/24/2023 132 (L)  136 - 145 mmol/L Final    Potassium 05/24/2023 4.7  3.5 - 5.1 mmol/L Final    Chloride 05/24/2023 97 (L)  98 - 107 mmol/L Final    CO2 05/24/2023 22  22 - 29 mmol/L Final    Globulin 05/24/2023 2.9  1.5 - 4.5 Final    Albumin/Globulin Ratio 05/24/2023 1.2  1.0 - 2.7 Final    BUN/CREAT RATIO 05/24/2023 18.1  6 - 20 Final    GFR ESTIMATION 05/24/2023 50.11 (L)  >60.00 Final    Anion Gap 05/24/2023 13  8 - 17 mmol/L Final    Cholesterol 05/24/2023 115  100 - 200 mg/dL Final    Triglycerides 05/24/2023 273 (H)  0 - 150 mg/dL Final    HDL 05/24/2023 34 (L)  >60 mg/dL Final    LDL Cholesterol 05/24/2023 26  0 - 100 mg/dL Final    LDL/HDL Ratio 05/24/2023 0.8 (L)  1.0 - 3.0 Final   Patient Outreach on 03/14/2023   Component Date Value Ref Range Status    Hemoglobin A1C 02/09/2023 6.6 (H)  4.0 - 6.0 % Final    Glucose 02/09/2023 136 (H)  74 - 106 mg/dL Final    BUN 02/09/2023 17.7  6 - 20 mg/dL Final    Creatinine 02/09/2023 1.0 (H)  0.5 - 0.9 mg/dL Final    AST 02/09/2023 11  0 - 32 U/L Final    ALT 02/09/2023 14  0 - 33 U/L Final    Alkaline Phosphatase 02/09/2023 134 (H)  35 - 105 U/L Final    Calcium  02/09/2023 9.4  8.6 - 10.2 mg/dL Final    Protein, Total 02/09/2023 7.5  6.4 - 8.3 g/dL Final    Albumin 02/09/2023 4.2  3.5 - 5.2 g/dL Final    Total Bilirubin 02/09/2023 0.3  0.0 - 1.2 mg/dL Final    Sodium 02/09/2023 135 (L)  136 - 145 mmol/L Final    Potassium 02/09/2023 4.4  3.5 - 5.1 mmol/L Final    Chloride 02/09/2023 98  98 - 107 mmol/L Final    CO2 02/09/2023 28  22 - 29 mmol/L Final    Globulin 02/09/2023 3.3  1.5 - 4.5 g/dL Final    Albumin/Globulin Ratio 02/09/2023 1.3  1.0 - 2.7 Final    BUN/CREAT RATIO 02/09/2023 18.4  6 - 20 Final    GFR ESTIMATION 02/09/2023 69.44  >60.00 Final    Anion Gap 02/09/2023 9  8 - 17 mmol/L Final    Cholesterol 02/09/2023 164  100 - 200 mg/dL Final    Triglycerides 02/09/2023 276 (H)  0 - 150 mg/dL Final    HDL 02/09/2023 39 (L)  >60 mg/dL Final    LDL Cholesterol 02/09/2023 70  0 - 100 mg/dL Final    LDL/HDL Ratio 02/09/2023 1.8  1.0 - 3.0 Final   Patient Outreach on 03/07/2023   Component Date Value Ref Range Status    BCS Recommendation External 05/31/2018 Repeat mammogram in 1 year   Final         Assessment:      1. Benign essential HTN Continue current regimen   2. Mixed hyperlipidemia    3. Morbid obesity with BMI of 40.0-44.9, adult    4. Type 2 diabetes mellitus with other neurologic complication, with long-term current use of insulin Continue current regimen   5. Gastroesophageal reflux disease with esophagitis without hemorrhage Well controlled   6. Other specified hypothyroidism    7. Other ascites    8. Other emphysema    9. Other primary ovarian failure    10. Degenerative disc disease, cervical    11. Degenerative disc disease, lumbar          Plan:     Benign essential HTN  Comments:  well controlled, limit salt intake    Mixed hyperlipidemia  Comments:  continue STATIN daily, eat a low cholesterol diet  Orders:  -     atorvastatin (LIPITOR) 80 MG tablet; Take 1 tablet (80 mg total) by mouth every evening.  Dispense: 90 tablet; Refill: 3    Morbid obesity  with BMI of 40.0-44.9, adult  Comments:  Eat a low carb, low sugar diet and exercise regularly    Type 2 diabetes mellitus with other neurologic complication, with long-term current use of insulin  Comments:  stable, eat a low carb, low sugar diet, keep scheduled appts with Endocrinology.    Gastroesophageal reflux disease with esophagitis without hemorrhage  Comments:  continue medication, avoid large meals and avoid lying down after meals    Other specified hypothyroidism  Comments:  Continue levothyroxine daily, controlled  Orders:  -     levothyroxine (SYNTHROID) 88 MCG tablet; Take 1 tablet (88 mcg total) by mouth before breakfast.  Dispense: 90 tablet; Refill: 1    Other ascites  -     furosemide (LASIX) 40 MG tablet; Take 1 tablet (40 mg total) by mouth once daily.  Dispense: 90 tablet; Refill: 1    Other emphysema  Comments:  continue medications, continue smoking cessation  Orders:  -     COMBIVENT RESPIMAT  mcg/actuation inhaler; Rescue  Dispense: 4 g; Refill: 3    Other primary ovarian failure  -     estradioL (ESTRACE) 1 MG tablet; Take 1 tablet (1 mg total) by mouth once daily.  Dispense: 90 tablet; Refill: 3    Degenerative disc disease, cervical  Comments:  Continue medications and keep scheduled appts with Orthopedics and Neurosurgery    Degenerative disc disease, lumbar  Comments:  Continue medications and keep scheduled appts with Orthopedics and Neurosurgery         Current Outpatient Medications   Medication Sig Dispense Refill    acetaminophen-codeine 300-30mg (TYLENOL #3) 300-30 mg Tab TAKE 1 TABLET BY MOUTH EVERY 6 HOURS AS NEEDED MAY MAKE DROWSY      albuterol (PROVENTIL/VENTOLIN HFA) 90 mcg/actuation inhaler INHALE 2 PUFFS BY MOUTH EVERY EVENING AS NEEDED for SHORTNESS OF BREATH or FOR WHEEZING thank you**ynes** :-) 6.7 g 2    amitriptyline (ELAVIL) 50 MG tablet TAKE 1 TABLET BY MOUTH EVERY EVENING AS DIRECTED thank you**ynes** :-) 90 tablet 1    ammonium lactate (LAC-HYDRIN) 12 %  "lotion APPLY TO AFFECTED AREA 2 TIMES A DAY thank youmike -)      azelastine (ASTELIN) 137 mcg (0.1 %) nasal spray SPRAY 1 SPRAY in nose 2 TIMES A DAY thank you**ynes** :-) 30 mL 1    cholecalciferol, vitamin D3, 1,250 mcg (50,000 unit) capsule TAKE 1 CAPSULE BY MOUTH WEEKLY AS DIRECTED 12 capsule 2    cyclobenzaprine (FLEXERIL) 10 MG tablet TAKE 1 TABLET BY MOUTH 3 TIMES A DAY AS NEEDED FOR MUSCLE SPASMS MAY MAKE DROWSY      diclofenac (VOLTAREN) 75 MG EC tablet Take 75 mg by mouth 2 (two) times daily.      dulaglutide (TRULICITY) 4.5 mg/0.5 mL pen injector Inject 4.5 mg into the skin every 7 days. 4 pen 2    DULoxetine (CYMBALTA) 30 MG capsule TAKE 1 CAPSULE BY MOUTH TWICE DAILY as needed for pain 180 capsule 1    fluticasone (VERAMYST) 27.5 mcg/actuation nasal spray 2 sprays by Nasal route.      folic acid (FOLVITE) 1 MG tablet TAKE 1 TABLET BY MOUTH DAILY. 90 tablet 3    loratadine (CLARITIN) 10 mg tablet Take 10 mg by mouth.      losartan (COZAAR) 100 MG tablet Take 1 tablet (100 mg total) by mouth once daily. 90 tablet 3    meloxicam (MOBIC) 7.5 MG tablet TAKE 1 TABLET BY MOUTH 2 TIMES A DAY FOR 10 TO 14 DAYS WITH FOOD AND REST FOR 2 TO 4 DAYS AND CONTINUE IF PAIN REOCCURS      MOUNJARO 15 mg/0.5 mL PnIj Inject 15 mg into the skin once a week. 4 pen 2    omeprazole (PRILOSEC) 20 MG capsule TAKE 1 CAPSULE BY MOUTH once DAILY thank you**ynes** :-) 30 capsule 2    potassium chloride SA (K-DUR,KLOR-CON) 20 MEQ tablet TAKE 1 TABLET BY MOUTH once DAILY 30 tablet 2    pregabalin (LYRICA) 300 MG Cap Take 300 mg by mouth 2 (two) times a day.      TOUJEO MAX U-300 SOLOSTAR 300 unit/mL (3 mL) insulin pen INJECT 100 UNITS SUBCUTANEOUS once DAILY      TRUEPLUS PEN NEEDLE 32 gauge x 5/32" Ndle USE DAILY OR AS DIRECTED      varenicline (CHANTIX) 1 mg Tab Begin after complete 7 days of varenicline 0.5 mg prescription. Take 1 tablet (1 mg total) by mouth 2 (two) times daily. 56 tablet 1    XIGDUO XR 5-1,000 mg TBph TAKE 2 " TABLETS BY MOUTH EACH MORNING      atorvastatin (LIPITOR) 80 MG tablet Take 1 tablet (80 mg total) by mouth every evening. 90 tablet 3    COMBIVENT RESPIMAT  mcg/actuation inhaler Rescue 4 g 3    estradioL (ESTRACE) 1 MG tablet Take 1 tablet (1 mg total) by mouth once daily. 90 tablet 3    furosemide (LASIX) 40 MG tablet Take 1 tablet (40 mg total) by mouth once daily. 90 tablet 1    levothyroxine (SYNTHROID) 88 MCG tablet Take 1 tablet (88 mcg total) by mouth before breakfast. 90 tablet 1     No current facility-administered medications for this visit.       Medications Discontinued During This Encounter   Medication Reason    atorvastatin (LIPITOR) 80 MG tablet Reorder    estradioL (ESTRACE) 1 MG tablet Reorder    levothyroxine (SYNTHROID) 88 MCG tablet Reorder    COMBIVENT RESPIMAT  mcg/actuation inhaler Reorder    furosemide (LASIX) 40 MG tablet Reorder       Health Maintenance   Topic Date Due    Hepatitis C Screening  Never done    Foot Exam  Never done    Eye Exam  Never done    TETANUS VACCINE  08/06/2014    Shingles Vaccine (1 of 2) Never done    Hemoglobin A1c  11/24/2023    Lipid Panel  05/24/2024    Colorectal Cancer Screening  06/27/2024    Mammogram  07/07/2024    Low Dose Statin  08/30/2024       Patient Instructions   RTC in 3 months for F/U or sooner if needed.    Keep appts with specialists as scheduled.    Patient Education       Low Cholesterol, Saturated Fat, and Trans Fat Diet   About this topic   Cholesterol, saturated fat, and trans fat are in many foods. These may raise your blood cholesterol levels. If your cholesterol is too high, this can cause health problems in your heart, liver, kidneys, and even your eyes. The key to lowering your risk of heart problems is to lower your bad fat intake.  Saturated fats and trans fats are the bad fats. These fats clog your arteries and raise your bad cholesterol. Saturated fats and trans fats are solid fats at room temperature. Saturated  "fats are animal fats. Trans fats are manmade fats. They add flavor to a lot of packaged foods. Staying away from saturated and trans fats will help your heart.  When you do eat foods with fat, make sure they have the good fats. Monounsaturated and polyunsaturated fats are good fats. These fats help raise your good cholesterol and protect your heart.  General   How to Lower Fat and Cholesterol in Your Diet   Read the labels of the foods you buy from the market to find out how much fat is present. Under 5% of total fat on a label means it is "low fat". Over 20% of total fat on a label means it is high fat.  Eat high fiber foods, like soluble fiber. This type of fiber helps lower cholesterol in the body. Choose oatmeal, fruits (like apples), beans, and nuts to get the most soluble fiber.  Eat foods high in omega-3 fatty acids like gopal seeds, walnuts, salmon, tuna, trout, herring, flaxseed, and soybeans. These foods help keep the heart healthy.  Limit your bad fat and oil intake.  Stay away from butter, stick margarine, shortening, lard, and palm and coconut oil. Pick plant-based spreads instead.  Limit mayonnaise, salad dressings, gravies, and sauces, unless it is made from low-fat ingredients.  Limit chocolate.  Do not eat high-fat processed foods like hot dogs, feliciano, sausage, ham and other luncheon meats high in fat, and some frozen foods. Pick fish, chicken, turkey, and lean meats instead.  Eat more dried beans, lentils, and tofu to get your protein.  Do not eat organ meats, like liver.  Choose nonfat or low-fat milk, yogurt, and cheese.  Use light or fat-free cream cheese and sour cream.  Eat lots of fruits and vegetables.  Pick whole grain breads, cereals, pastas, and rice.  Do not eat snacks that are high in fats like granola, cookies, pies, pastries, doughnuts, and croissants.  Stay away from deep fried foods.  Help When Cooking   Remove the fat portion of meats and the skin from poultry before " cooking.  Bake, broil, grill, poach, or roast poultry, fish, and lean meats.  Drain and throw away the fat that drains out of meat as you cook it.  Try to add little or no fat to foods.  Use olive or canola oil for cooking or baking.  Steam your vegetables.  Use herbs or no-oil marinades to flavor foods.         Who should use this diet?   This diet is for people who are at high risk of getting health problems like heart disease, high blood pressure, diabetes, and others. This diet is also good for all people to follow to keep your heart healthy.  What foods are good to eat?   Foods with good fats are:  Canola, peanut, and olive oil  Safflower, soybean, and corn oil  Walnuts, almonds, cashews, and peanuts  Pumpkin and sunflower seeds  [place] and tuna  Tofu  Soymilk  Avocado  What foods should be limited or avoided?   Stay away from these types of foods that have saturated fats:  Whole fat dairy products like cheese, ice cream, whole milk, and cream  Palm and coconut oils  High-fat meats like beef, lamb, poultry with the skin, feliciano, and sausage  Butter and lard  Stay away from these types of foods that may have trans fat:  Cookies, cakes, candy, doughnuts, baked goods, muffins, pizza dough, and pie crusts that are packaged  Fried foods  Frozen dinners  Chips and crackers  Microwave popcorn  Stick margarine and vegetable shortenings  Helpful tips   To help stay away from saturated fat:  Pick lean cuts of meat  Take the skin off chicken and turkey or pick skinless  Pick low-fat cheese, milk, and ice cream  Use liquid oils when cooking and baking, such as olive oil and canola oil  To help stay away from trans fat:  Look at your labels. Choose foods with 0% trans fat. Read the ingredient list. Avoid foods with partially hydrogenated oil in the ingredient list. This means there is trans fat in the product.  Where can I learn more?   American Heart Association  "  http://www.heart.org/HEARTORG/Conditions/Cholesterol/PreventionTreatmentofHighCholesterol/Know-Your-Fats_[facility]_305628_Article.jsp   Last Reviewed Date   2021-10-05  Consumer Information Use and Disclaimer   This information is not specific medical advice and does not replace information you receive from your health care provider. This is only a brief summary of general information. It does NOT include all information about conditions, illnesses, injuries, tests, procedures, treatments, therapies, discharge instructions or life-style choices that may apply to you. You must talk with your health care provider for complete information about your health and treatment options. This information should not be used to decide whether or not to accept your health care providers advice, instructions or recommendations. Only your health care provider has the knowledge and training to provide advice that is right for you.   Copyright   Copyright © 2021 [employer], [employer] and its affiliates and/or licensors. All rights reserved.  Patient Education       Diabetes and Diet   The Basics   Written by the doctors and editors at [employer]   Why is diet important in diabetes? -- Diet is important because it is part of diabetes treatment. Many people need to change what they eat and how much they eat to help treat their diabetes. It is important for people to treat their diabetes so that they:  Keep their blood sugar at or near a normal level  Prevent long-term problems, such as heart or kidney problems, that can happen in people with diabetes  Changing your diet can also help treat obesity, high blood pressure, and high cholesterol. These conditions can affect people with diabetes and can lead to future problems, such as heart attacks or strokes.  Who will work with me to change my diet? -- Your doctor or nurse will work with you to make a food plan to change your diet. They might also recommend that you work with a "dietitian." A dietitian " "is an expert on food and eating.  Do I need to eat at the same times every day? -- When and how often you should eat depends, in part, on the diabetes medicines you take. For example:  People who take about the same amount of insulin at the same time each day (called a "fixed regimen") should eat meals at the same times. This is also true for people who take pills that increase insulin levels, such as sulfonylureas. Eating meals at the same time every day helps prevent low blood sugar.  People who adjust the dose and timing of their insulin each day (called a "flexible regimen") do not always have to eat meals at the same time. That's because they can time their insulin dose for before they plan to eat, and also adjust the dose for how much they plan to eat.  People who take medicines that don't usually cause low blood sugar, such as metformin, don't have to eat meals at the same time every day.  What do I need to think about when planning what to eat? -- Our bodies break down the food we eat into small pieces called carbohydrates, proteins, and fats.  When planning what to eat, people with diabetes need to think about:  Carbohydrates (or "carbs") - Carbohydrates, which are sugars that our bodies use for energy, can raise a person's blood sugar level. Your doctor, nurse, or dietitian will tell you how many carbohydrates you should eat at each meal or snack. Foods that have carbohydrates include:  Bread, pasta, and rice  Vegetables and fruits  Dairy foods  Foods and drinks with added sugar  It is best to get your carbohydrates from fruits, vegetables, whole grains, and low-fat milk. It is best to avoid drinks with added sugar, like soda, juices, and sports drinks.   Protein - Your doctor, nurse, or dietitian will tell you how much protein you should eat each day. It is best to eat lean meats, fish, eggs, beans, peas, soy products, nuts, and seeds.  Fats - The type of fat you eat is more important than the amount of " "fat. "Saturated" and "trans" fats can increase your risk for heart problems, like a heart attack.  Foods that have saturated fats include meat, butter, cheese, and ice cream.  Foods that have trans fats include processed food with "partially hydrogenated oils" on the ingredient list. This may include fried foods, store bought cookies, muffins, pies, and cakes.  "Monounsaturated" and "polyunsaturated" fats are better for you. Foods with these types of fat include fish, avocado, olive oil, and nuts.  Calories - People need to eat a certain amount of calories each day to keep their weight the same. People who are overweight and want to lose weight need to eat fewer calories each day.  Fiber - Eating foods with a lot of fiber can help control a person's blood sugar level. Foods that have a lot of fiber include apples, green beans, peas, beans, lentils, nuts, oatmeal, and whole grains.  Salt - People who have high blood pressure should not eat foods that contain a lot of salt (also called sodium). People with high blood pressure should also eat healthy foods, such as fruits, vegetables, and low-fat dairy foods.  Alcohol - Having more than 1 drink (for women) or 2 drinks (for men) a day can raise blood sugar levels. Also, drinks that have fruit juice or soda in them can raise blood sugar levels.  What can I do if I need to lose weight? -- If you need to lose weight, you can:  Exercise - Try to get at least 30 minutes of physical activity a day, most days of the week. Even gentle exercise, like walking, is good for your health. Some people with diabetes need to change their medicine dose before they exercise. They might also need to check their blood sugar levels before and after exercising.  Eat fewer calories - Your doctor, nurse, or dietitian can tell you how many calories you should eat each day in order to lose weight.  If you are worried about your weight, size, or shape, talk with your doctor, nurse, or dietitian. " They can help you make changes to improve your health.  Can I eat the same foods as my family? -- Yes. You do not need to eat special foods if you have diabetes. You and your family can eat the same foods. Changing your diet is mostly about eating healthy foods and not eating too much.  What are the other parts of diabetes treatment? -- Besides changing your diet, the other parts of diabetes treatment are:  Exercise  Medicines  Some people with diabetes need to learn how to match their diet and exercise with their medicine dose. For example, people who use insulin might need to choose the dose of insulin they give themselves. To choose their dose, they need to think about:  What they plan to eat at the next meal  How much exercise they plan to do  What their blood sugar level is  If the diet and exercise do not match the medicine dose, a person's blood sugar level can get too low or too high. Blood sugar levels that are too low or too high can cause problems.  All topics are updated as new evidence becomes available and our peer review process is complete.  This topic retrieved from [employer] on: Sep 21, 2021.  Topic 17464 Version 7.0  Release: 29.4.2 - C29.263  © 2021 UpToDate, Inc. and/or its affiliates. All rights reserved.  Consumer Information Use and Disclaimer   This information is not specific medical advice and does not replace information you receive from your health care provider. This is only a brief summary of general information. It does NOT include all information about conditions, illnesses, injuries, tests, procedures, treatments, therapies, discharge instructions or life-style choices that may apply to you. You must talk with your health care provider for complete information about your health and treatment options. This information should not be used to decide whether or not to accept your health care provider's advice, instructions or recommendations. Only your health care provider has the  knowledge and training to provide advice that is right for you. The use of this information is governed by the [employer] End User License Agreement, available at [email or web address]://[email or web address]/[email or web address]/bhragav.The use of [employer] content is governed by the [employer] Terms of Use. ©2021 UpToDate, Inc. All rights reserved.  Copyright   © 2021 UpToDate, Inc. and/or its affiliates. All rights reserved.      Risks, benefits, and alternatives discussed with patient, Patient verbalized understanding of discussed plan of care. Asked patient if any further questions, answered no.    Future Appointments   Date Time Provider Department Center   12/5/2023 11:00 AM Yaima Pulliam NP [id number]  Artemio Pulliam NP

## 2023-09-01 ENCOUNTER — TELEPHONE (OUTPATIENT)
Dept: SMOKING CESSATION | Facility: CLINIC | Age: 58
End: 2023-09-01
Payer: MEDICARE

## 2023-09-01 NOTE — TELEPHONE ENCOUNTER
Spoke with patient regarding scheduling follow up appointment.  Appointment scheduled for 9/12/2023.  Patient also requested a refill on her 1mg Chantix prescription.  Counselor will submit refill.

## 2023-09-12 ENCOUNTER — CLINICAL SUPPORT (OUTPATIENT)
Dept: SMOKING CESSATION | Facility: CLINIC | Age: 58
End: 2023-09-12
Payer: COMMERCIAL

## 2023-09-12 DIAGNOSIS — F17.200 NICOTINE DEPENDENCE: Primary | ICD-10-CM

## 2023-09-12 PROCEDURE — 90853 PR GROUP PSYCHOTHERAPY: ICD-10-PCS | Mod: S$GLB,,, | Performed by: GENERAL PRACTICE

## 2023-09-12 PROCEDURE — 90853 GROUP PSYCHOTHERAPY: CPT | Mod: S$GLB,,, | Performed by: GENERAL PRACTICE

## 2023-09-12 NOTE — PROGRESS NOTES
Individual Follow-Up Form    9/12/2023    Quit Date: TBD    Clinical Status of Patient: Outpatient    Length of Service: 60 minutes    Continuing Medication: yes  Chantix     Target Symptoms: Withdrawal and medication side effects. The following were  rated moderate (3) to severe (4) on TCRS:  Moderate (3): None Reported  Severe (4): None Reported    Comments: Spoke with patient at length in clinic regarding tobacco cessation progress. Patient remains on prescribed tobacco cessation medication 1mg Chantix without any negative side effects at this time.  Patient decreased to 1 cigarette daily.  Patient will make home and vehicle tobacco free.  Reviewed strategies, habitual behavior, high risks situations, understanding urges and cravings, stress and relaxation with open discussion and additional interventions, Introduced lapses, relapses, understanding them and analyzing the situation of a lapse, conflict issues that may be linked to a lapse.  Counselor also suggested that patient utilize jolly ranchers, dum dum lollipops, and toffee candy to help control nicotine cravings.  Counselor recommended that patient stay hydrated and eat fruits and vegetables to help decrease nicotine cravings.   Patient appeared to be receptive to the information given.  Counselor will continue to motivate patient to be tobacco free.     Diagnosis: F17.200    Next Visit: 1 week

## 2023-09-25 ENCOUNTER — CLINICAL SUPPORT (OUTPATIENT)
Dept: SMOKING CESSATION | Facility: CLINIC | Age: 58
End: 2023-09-25
Payer: COMMERCIAL

## 2023-09-25 DIAGNOSIS — F17.200 NICOTINE DEPENDENCE: Primary | ICD-10-CM

## 2023-09-25 PROCEDURE — 99407 PR TOBACCO USE CESSATION INTENSIVE >10 MINUTES: ICD-10-PCS | Mod: S$GLB,,, | Performed by: GENERAL PRACTICE

## 2023-09-25 PROCEDURE — 99407 BEHAV CHNG SMOKING > 10 MIN: CPT | Mod: S$GLB,,, | Performed by: GENERAL PRACTICE

## 2023-09-25 RX ORDER — VARENICLINE TARTRATE 1 MG/1
1 TABLET, FILM COATED ORAL 2 TIMES DAILY
Qty: 56 TABLET | Refills: 1 | Status: SHIPPED | OUTPATIENT
Start: 2023-09-25 | End: 2024-01-22 | Stop reason: ALTCHOICE

## 2023-10-10 ENCOUNTER — CLINICAL SUPPORT (OUTPATIENT)
Dept: SMOKING CESSATION | Facility: CLINIC | Age: 58
End: 2023-10-10
Payer: COMMERCIAL

## 2023-10-10 DIAGNOSIS — F17.200 NICOTINE DEPENDENCE: Primary | ICD-10-CM

## 2023-10-10 PROCEDURE — 90853 PR GROUP PSYCHOTHERAPY: ICD-10-PCS | Mod: S$GLB,,, | Performed by: GENERAL PRACTICE

## 2023-10-10 PROCEDURE — 90853 GROUP PSYCHOTHERAPY: CPT | Mod: S$GLB,,, | Performed by: GENERAL PRACTICE

## 2023-10-10 NOTE — PROGRESS NOTES
Individual Follow-Up Form    10/10/2023    Quit Date: TBD    Clinical Status of Patient: Outpatient    Length of Service: 60 minutes    Continuing Medication: yes  Chantix     Target Symptoms: Withdrawal and medication side effects. The following were  rated moderate (3) to severe (4) on TCRS:  Moderate (3): None Reported  Severe (4): None Reported    Comments: Spoke with patient at length in clinic regarding tobacco cessation progress. Patient remains on prescribed tobacco cessation medication 1mg Chantix without any negative side effects at this time.  Patient decreased to 2-3 cigarettes daily.  Patient will cut down to 1 cigarette a day this week.  Reviewed strategies, habitual behavior, high risks situations, understanding urges and cravings, stress and relaxation with open discussion and additional interventions, Introduced lapses, relapses, understanding them and analyzing the situation of a lapse, conflict issues that may be linked to a lapse.  Counselor discussed the 4ds (delay, drink, distract, deep breathing) to address nicotine cravings.  Counselor recommended that patient stay hydrated and eat fruits and vegetables to help decrease nicotine cravings.   Patient appeared to be receptive to the information given.  Counselor will continue to motivate patient to be tobacco free.     Diagnosis: F17.200    Next Visit: 1 week

## 2023-10-13 ENCOUNTER — PATIENT OUTREACH (OUTPATIENT)
Dept: ADMINISTRATIVE | Facility: HOSPITAL | Age: 58
End: 2023-10-13
Payer: MEDICARE

## 2023-10-24 ENCOUNTER — TELEPHONE (OUTPATIENT)
Dept: SMOKING CESSATION | Facility: CLINIC | Age: 58
End: 2023-10-24
Payer: MEDICARE

## 2023-10-24 NOTE — TELEPHONE ENCOUNTER
Spoke with patient regarding rescheduling follow up appointment scheduled today.  Appt rescheduled for 10/31/2023 @ 11am.

## 2023-10-31 ENCOUNTER — CLINICAL SUPPORT (OUTPATIENT)
Dept: SMOKING CESSATION | Facility: CLINIC | Age: 58
End: 2023-10-31
Payer: COMMERCIAL

## 2023-10-31 DIAGNOSIS — F17.200 NICOTINE DEPENDENCE: Primary | ICD-10-CM

## 2023-10-31 PROCEDURE — 90853 PR GROUP PSYCHOTHERAPY: ICD-10-PCS | Mod: S$GLB,,, | Performed by: GENERAL PRACTICE

## 2023-10-31 PROCEDURE — 90853 GROUP PSYCHOTHERAPY: CPT | Mod: S$GLB,,, | Performed by: GENERAL PRACTICE

## 2023-10-31 NOTE — PROGRESS NOTES
Individual Follow-Up Form    10/31/2023    Quit Date: TBD    Clinical Status of Patient: Outpatient    Length of Service: 60 minutes    Continuing Medication: yes  Chantix     Target Symptoms: Withdrawal and medication side effects. The following were  rated moderate (3) to severe (4) on TCRS:  Moderate (3): None Reported  Severe (4): None Reported    Comments: Spoke with patient at length in clinic regarding tobacco cessation progress. Patient remains on prescribed tobacco cessation medication 1mg Chantix without any negative side effects at this time.  Patient decreased to 2-3 cigarettes daily.  Patient states she doesn't smoke on Sundays.  Patient will cut down to 1 cigarette a day this week.  Reviewed strategies, cues, triggers, high risk situations, lapses, relapses, diet, exercise, stress, relaxation, sleep, habitual behavior, and life style changes.  Patient only smokes outside and states that the resent cold weather will be a deterrent to smoke.  Patient will autumn in her refill.  Patient appeared to be receptive to the information given.  Counselor will continue to motivate patient to be tobacco free.    Diagnosis: F17.200    Next Visit: 1 week

## 2023-11-07 ENCOUNTER — CLINICAL SUPPORT (OUTPATIENT)
Dept: SMOKING CESSATION | Facility: CLINIC | Age: 58
End: 2023-11-07
Payer: COMMERCIAL

## 2023-11-07 DIAGNOSIS — F17.200 NICOTINE DEPENDENCE: Primary | ICD-10-CM

## 2023-11-07 PROCEDURE — 90853 PR GROUP PSYCHOTHERAPY: ICD-10-PCS | Mod: S$GLB,,, | Performed by: GENERAL PRACTICE

## 2023-11-07 PROCEDURE — 90853 GROUP PSYCHOTHERAPY: CPT | Mod: S$GLB,,, | Performed by: GENERAL PRACTICE

## 2023-11-07 NOTE — PROGRESS NOTES
Individual Follow-Up Form    11/7/2023    Quit Date: TBD    Clinical Status of Patient: Outpatient    Length of Service: 60 minutes    Continuing Medication: yes  Chantix     Target Symptoms: Withdrawal and medication side effects. The following were  rated moderate (3) to severe (4) on TCRS:  Moderate (3): None Reported  Severe (4): None Reported    Comments: Spoke with patient at length in clinic regarding tobacco cessation progress. Patient remains on prescribed tobacco cessation medication 1mg Chantix without any negative side effects at this time.  Patient decreased to 2 cigarettes daily.   Patient will cut down to 1 cigarette a day this week.  Reviewed strategies, habitual behavior, stress, and high risk situations. Introduced stress with addition interventions, SOLVE, relaxation with interventions, nutrition, exercise, weight gain, and the importance of rewarding oneself for accomplishments toward becoming tobacco free. Open discussion of all items with interventions.  Counselor discussed the 4ds (delay, drink, distract, deep breathing) to address nicotine cravings.  Counselor recommended that patient stay hydrated and eat fruits and vegetables to help decrease nicotine cravings.   Patient appeared to be receptive to the information given.  Counselor will continue to motivate patient to be tobacco free.     Diagnosis: F17.200    Next Visit: 1 week

## 2023-11-14 ENCOUNTER — TELEPHONE (OUTPATIENT)
Dept: SMOKING CESSATION | Facility: CLINIC | Age: 58
End: 2023-11-14

## 2023-11-14 NOTE — TELEPHONE ENCOUNTER
Attempted to contact patient regarding missed follow up appointment.  Patient did not answer.  Counselor left a voice message with name and contact information.

## 2023-11-27 ENCOUNTER — TELEPHONE (OUTPATIENT)
Dept: SMOKING CESSATION | Facility: CLINIC | Age: 58
End: 2023-11-27
Payer: MEDICARE

## 2023-11-28 ENCOUNTER — CLINICAL SUPPORT (OUTPATIENT)
Dept: SMOKING CESSATION | Facility: CLINIC | Age: 58
End: 2023-11-28
Payer: COMMERCIAL

## 2023-11-28 DIAGNOSIS — F17.200 NICOTINE DEPENDENCE: Primary | ICD-10-CM

## 2023-11-28 PROCEDURE — 99407 PR TOBACCO USE CESSATION INTENSIVE >10 MINUTES: ICD-10-PCS | Mod: S$GLB,,, | Performed by: GENERAL PRACTICE

## 2023-11-28 PROCEDURE — 99407 BEHAV CHNG SMOKING > 10 MIN: CPT | Mod: S$GLB,,, | Performed by: GENERAL PRACTICE

## 2023-11-28 NOTE — TELEPHONE ENCOUNTER
Attempted to contact patient to schedule follow up appointment.  Patient did not answer.  Counselor left a voice message with name and contact information.

## 2023-12-05 ENCOUNTER — TELEPHONE (OUTPATIENT)
Dept: SMOKING CESSATION | Facility: CLINIC | Age: 58
End: 2023-12-05

## 2023-12-05 NOTE — TELEPHONE ENCOUNTER
Spoke with patient regarding scheduled follow up appointment.  Patient requested to reschedule her appointment due to illness.  Appointment rescheduled for 12/12/2023 @ 1pm.

## 2023-12-06 DIAGNOSIS — E11.9 TYPE 2 DIABETES MELLITUS WITHOUT COMPLICATION: ICD-10-CM

## 2023-12-12 ENCOUNTER — CLINICAL SUPPORT (OUTPATIENT)
Dept: SMOKING CESSATION | Facility: CLINIC | Age: 58
End: 2023-12-12
Payer: COMMERCIAL

## 2023-12-12 DIAGNOSIS — F17.200 NICOTINE DEPENDENCE: Primary | ICD-10-CM

## 2023-12-12 PROCEDURE — 90853 PR GROUP PSYCHOTHERAPY: ICD-10-PCS | Mod: S$GLB,,, | Performed by: GENERAL PRACTICE

## 2023-12-12 PROCEDURE — 90853 GROUP PSYCHOTHERAPY: CPT | Mod: S$GLB,,, | Performed by: GENERAL PRACTICE

## 2023-12-12 RX ORDER — IBUPROFEN 200 MG
1 TABLET ORAL DAILY
Qty: 28 PATCH | Refills: 1 | Status: SHIPPED | OUTPATIENT
Start: 2023-12-12 | End: 2024-01-25 | Stop reason: DRUGHIGH

## 2023-12-12 NOTE — PROGRESS NOTES
Individual Follow-Up Form    12/12/2023    Quit Date: TBD    Clinical Status of Patient: Outpatient    Length of Service: 60 minutes    Continuing Medication: yes  Patches     Target Symptoms: Withdrawal and medication side effects. The following were  rated moderate (3) to severe (4) on TCRS:  Moderate (3): None Reported  Severe (4): None Reported    Comments: Spoke with patient at length in clinic regarding tobacco cessation progress. Patient remains on prescribed tobacco cessation medication 14mg nicotine patch without any negative side effects at this time.  Patient remains smoking 5 cigarettes per day.  Patient will cut down to 4 cigarettes a day this week.   Reviewed strategies, habitual behavior, high risks situations, understanding urges and cravings, stress and relaxation with open discussion and additional interventions, Introduced lapses, relapses, understanding them and analyzing the situation of a lapse, conflict issues that may be linked to a lapse. Patient expressed that she is experiencing family stressors that may be creating a trigger for her to smoke.  Counselor reviewed over healthy coping skills for patient to utilize to help decrease stress.  Counselor discussed the 4ds (delay, drink, distract, deep breathing) to address nicotine cravings. Patient submitted refill for 14mg nicotine patches.  Patient appeared to be receptive to the information given.  Counselor will continue to motivate patient to be tobacco free.    Diagnosis: F17.200    Next Visit: 1 week

## 2023-12-15 DIAGNOSIS — E87.6 LOW BLOOD POTASSIUM: ICD-10-CM

## 2023-12-18 RX ORDER — POTASSIUM CHLORIDE 20 MEQ/1
20 TABLET, EXTENDED RELEASE ORAL
Qty: 30 TABLET | Refills: 2 | Status: SHIPPED | OUTPATIENT
Start: 2023-12-18 | End: 2024-01-29 | Stop reason: SDUPTHER

## 2023-12-19 ENCOUNTER — CLINICAL SUPPORT (OUTPATIENT)
Dept: SMOKING CESSATION | Facility: CLINIC | Age: 58
End: 2023-12-19
Payer: COMMERCIAL

## 2023-12-19 DIAGNOSIS — F17.200 NICOTINE DEPENDENCE: Primary | ICD-10-CM

## 2023-12-19 PROCEDURE — 90853 GROUP PSYCHOTHERAPY: CPT | Mod: S$GLB,,, | Performed by: GENERAL PRACTICE

## 2023-12-19 PROCEDURE — 90853 PR GROUP PSYCHOTHERAPY: ICD-10-PCS | Mod: S$GLB,,, | Performed by: GENERAL PRACTICE

## 2023-12-19 NOTE — PROGRESS NOTES
Individual Follow-Up Form    12/19/2023    Quit Date: TBD    Clinical Status of Patient: Outpatient    Length of Service: 60 minutes    Continuing Medication: yes  Patches     Target Symptoms: Withdrawal and medication side effects. The following were  rated moderate (3) to severe (4) on TCRS:  Moderate (3): None Reported  Severe (4): None Reported    Comments: Spoke with patient at length in clinic regarding tobacco cessation progress. Patient remains on prescribed tobacco cessation medication 14mg nicotine patch without any negative side effects at this time.  Patient remains smoking 5-6 cigarettes per day.  Patient will cut down to  4 cigarettes a day this week.  Reviewed strategies, habitual behavior, stress, and high risk situations. Introduced stress with addition interventions, SOLVE, relaxation with interventions, nutrition, exercise, weight gain, and the importance of rewarding oneself for accomplishments toward becoming tobacco free. Open discussion of all items with interventions.  Counselor continues to experience increase levels of stress.  Counselor reviewed effective coping skills.  Counselor discussed the 4ds (delay, drink, distract, deep breathing) to address nicotine cravings.  Patient appeared to be receptive to the information given.  Counselor will continue to motivate patient to be tobacco free.    Diagnosis: F17.200    Next Visit: 1 week

## 2024-01-09 ENCOUNTER — TELEPHONE (OUTPATIENT)
Dept: SMOKING CESSATION | Facility: CLINIC | Age: 59
End: 2024-01-09
Payer: MEDICARE

## 2024-01-09 NOTE — TELEPHONE ENCOUNTER
Spoke with patient regarding missed follow up appointment.  Appointment rescheduled for 1/16/2024 @ 8am via phone.

## 2024-01-16 ENCOUNTER — CLINICAL SUPPORT (OUTPATIENT)
Dept: SMOKING CESSATION | Facility: CLINIC | Age: 59
End: 2024-01-16
Payer: COMMERCIAL

## 2024-01-16 DIAGNOSIS — F17.200 NICOTINE DEPENDENCE: Primary | ICD-10-CM

## 2024-01-16 PROCEDURE — 90853 GROUP PSYCHOTHERAPY: CPT | Mod: S$GLB,,, | Performed by: GENERAL PRACTICE

## 2024-01-16 NOTE — PROGRESS NOTES
Individual Follow-Up Form    1/16/2024    Quit Date: TBD    Clinical Status of Patient: Outpatient    Length of Service: 45 minutes    Continuing Medication: yes  Patches     Target Symptoms: Withdrawal and medication side effects. The following were  rated moderate (3) to severe (4) on TCRS:  Moderate (3): None Reported  Severe (4): None Reported    Comments: Spoke with patient at length via phone regarding tobacco cessation progress. Patient remains on prescribed tobacco cessation medication 14mg nicotine patch without any negative side effects at this time.  Patient is currently smoking a 1/2 pack of cigarettes daily.  Patient will cut down to 10 cigarettes a day this week.  Reviewed strategies, habitual behavior, high risks situations, understanding urges and cravings, stress and relaxation with open discussion and additional interventions, Introduced lapses, relapses, understanding them and analyzing the situation of a lapse, conflict issues that may be linked to a lapse.  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  Counselor discussed healthy coping skills.  Counselor discussed the 4ds (delay, drink, distract, deep breathing) to address nicotine cravings.  Counselor suggested that patient set daily smoking limits by limiting the number of cigarettes she has access to daily.  Counselor also suggested that patient utilize jolly ranchers, dum dum lollipops, and toffee candy to help control nicotine cravings.  Patient appeared to be receptive to the information given.  Counselor will continue to motivate patient to be tobacco free.    Diagnosis: F17.200    Next Visit: 1 week

## 2024-01-22 ENCOUNTER — CLINICAL SUPPORT (OUTPATIENT)
Dept: SMOKING CESSATION | Facility: CLINIC | Age: 59
End: 2024-01-22
Payer: COMMERCIAL

## 2024-01-22 ENCOUNTER — OFFICE VISIT (OUTPATIENT)
Dept: PRIMARY CARE CLINIC | Facility: CLINIC | Age: 59
End: 2024-01-22
Payer: MEDICARE

## 2024-01-22 VITALS
DIASTOLIC BLOOD PRESSURE: 84 MMHG | BODY MASS INDEX: 42.49 KG/M2 | HEART RATE: 93 BPM | SYSTOLIC BLOOD PRESSURE: 130 MMHG | OXYGEN SATURATION: 99 % | HEIGHT: 65 IN | WEIGHT: 255 LBS

## 2024-01-22 DIAGNOSIS — F17.200 NICOTINE DEPENDENCE: Primary | ICD-10-CM

## 2024-01-22 DIAGNOSIS — G47.09 OTHER INSOMNIA: ICD-10-CM

## 2024-01-22 DIAGNOSIS — R05.1 ACUTE COUGH: ICD-10-CM

## 2024-01-22 DIAGNOSIS — E78.2 MIXED HYPERLIPIDEMIA: ICD-10-CM

## 2024-01-22 DIAGNOSIS — M51.36 DEGENERATIVE DISC DISEASE, LUMBAR: ICD-10-CM

## 2024-01-22 DIAGNOSIS — Z11.4 SCREENING FOR HIV (HUMAN IMMUNODEFICIENCY VIRUS): ICD-10-CM

## 2024-01-22 DIAGNOSIS — M50.30 DEGENERATIVE DISC DISEASE, CERVICAL: ICD-10-CM

## 2024-01-22 DIAGNOSIS — K21.00 GASTROESOPHAGEAL REFLUX DISEASE WITH ESOPHAGITIS WITHOUT HEMORRHAGE: ICD-10-CM

## 2024-01-22 DIAGNOSIS — M54.9 OTHER CHRONIC BACK PAIN: ICD-10-CM

## 2024-01-22 DIAGNOSIS — I10 BENIGN ESSENTIAL HTN: ICD-10-CM

## 2024-01-22 DIAGNOSIS — R35.0 URINE FREQUENCY: ICD-10-CM

## 2024-01-22 DIAGNOSIS — G89.29 OTHER CHRONIC BACK PAIN: ICD-10-CM

## 2024-01-22 DIAGNOSIS — J43.8 OTHER EMPHYSEMA: ICD-10-CM

## 2024-01-22 DIAGNOSIS — R18.8 OTHER ASCITES: ICD-10-CM

## 2024-01-22 DIAGNOSIS — Z79.4 TYPE 2 DIABETES MELLITUS WITH DIABETIC POLYNEUROPATHY, WITH LONG-TERM CURRENT USE OF INSULIN: ICD-10-CM

## 2024-01-22 DIAGNOSIS — E11.42 TYPE 2 DIABETES MELLITUS WITH DIABETIC POLYNEUROPATHY, WITH LONG-TERM CURRENT USE OF INSULIN: ICD-10-CM

## 2024-01-22 DIAGNOSIS — Z11.59 NEED FOR HEPATITIS C SCREENING TEST: ICD-10-CM

## 2024-01-22 DIAGNOSIS — E55.9 VITAMIN D DEFICIENCY: ICD-10-CM

## 2024-01-22 DIAGNOSIS — E03.8 OTHER SPECIFIED HYPOTHYROIDISM: ICD-10-CM

## 2024-01-22 DIAGNOSIS — J30.2 SEASONAL ALLERGIES: ICD-10-CM

## 2024-01-22 DIAGNOSIS — Z00.00 ANNUAL PHYSICAL EXAM: Primary | ICD-10-CM

## 2024-01-22 DIAGNOSIS — E66.01 MORBID OBESITY WITH BMI OF 40.0-44.9, ADULT: ICD-10-CM

## 2024-01-22 PROCEDURE — 3075F SYST BP GE 130 - 139MM HG: CPT | Mod: CPTII,S$GLB,, | Performed by: NURSE PRACTITIONER

## 2024-01-22 PROCEDURE — 3044F HG A1C LEVEL LT 7.0%: CPT | Mod: CPTII,S$GLB,, | Performed by: NURSE PRACTITIONER

## 2024-01-22 PROCEDURE — 90853 GROUP PSYCHOTHERAPY: CPT | Mod: S$GLB,,, | Performed by: GENERAL PRACTICE

## 2024-01-22 PROCEDURE — 3079F DIAST BP 80-89 MM HG: CPT | Mod: CPTII,S$GLB,, | Performed by: NURSE PRACTITIONER

## 2024-01-22 PROCEDURE — 3008F BODY MASS INDEX DOCD: CPT | Mod: CPTII,S$GLB,, | Performed by: NURSE PRACTITIONER

## 2024-01-22 PROCEDURE — 99396 PREV VISIT EST AGE 40-64: CPT | Mod: S$GLB,,, | Performed by: NURSE PRACTITIONER

## 2024-01-22 RX ORDER — DULOXETIN HYDROCHLORIDE 30 MG/1
CAPSULE, DELAYED RELEASE ORAL
Qty: 180 CAPSULE | Refills: 1 | Status: SHIPPED | OUTPATIENT
Start: 2024-01-22

## 2024-01-22 RX ORDER — BENZONATATE 100 MG/1
100 CAPSULE ORAL 3 TIMES DAILY PRN
Qty: 30 CAPSULE | Refills: 0 | Status: SHIPPED | OUTPATIENT
Start: 2024-01-22 | End: 2024-02-01

## 2024-01-22 RX ORDER — LEVOTHYROXINE SODIUM 88 UG/1
88 TABLET ORAL
Qty: 90 TABLET | Refills: 1 | Status: SHIPPED | OUTPATIENT
Start: 2024-01-22

## 2024-01-22 RX ORDER — FUROSEMIDE 40 MG/1
40 TABLET ORAL DAILY
Qty: 90 TABLET | Refills: 1 | Status: SHIPPED | OUTPATIENT
Start: 2024-01-22

## 2024-01-22 RX ORDER — AZELASTINE 1 MG/ML
SPRAY, METERED NASAL
Qty: 30 ML | Refills: 2 | Status: SHIPPED | OUTPATIENT
Start: 2024-01-22 | End: 2024-04-24

## 2024-01-22 RX ORDER — AMITRIPTYLINE HYDROCHLORIDE 50 MG/1
TABLET, FILM COATED ORAL
Qty: 90 TABLET | Refills: 1 | Status: SHIPPED | OUTPATIENT
Start: 2024-01-22

## 2024-01-22 NOTE — PATIENT INSTRUCTIONS
RTC in 3 months for F/U or sooner if needed.    Keep appts with specialists as scheduled.    Patient Education       Yearly Physical for Adults   About this topic   Most people do not want to be sick. Having a checkup each year with your doctor is one way to help you stay healthy. You may need to see your doctor more or less often. How often you need to go to the doctor depends on your age. Your family and medical history also play a role in how often you need to go to the doctor. Going to see your doctor on a routine basis can help you find problems early or even before they start. This may make it easier to treat or cure your problem.  General   Your doctor will talk about many things during your checkup. Your doctor may ask about:  Your medical and family history.  All the drugs you are taking. Be sure to include all prescription, over the counter, and herbal supplements. Tell the doctor if you have any drug allergy. Bring a list of drugs you take with you.  How you are feeling and if you are having any problems.  Risky behaviors like smoking, drinking alcohol, using illegal drugs, not wearing seatbelts, having unprotected sex, etc.  Your doctor will do a physical exam and may check your:  Height and weight  Blood pressure  Reflexes  Memory  Vision  Hearing  Your doctor may order:  Lab tests  ECG to check your heart rhythm  X-rays  Tests or treatments based on your exam  What lifestyle changes are needed?   Your doctor may suggest you make changes to your lifestyle at this visit. The doctor may talk with you about being more active or lowering stress levels. Ask your doctor what you need to do.  What drugs may be needed?   Your doctor may order drugs or vaccines to protect you from illnesses.  What changes to diet are needed?   Talk to your doctor to see if any changes are needed to your diet.  When do I need to call the doctor?   Call your doctor if you need to learn about any test results. Together you can make  a plan for more care.  Helpful tips   Make a list of questions for your doctor before you go. This will help you remember to ask about any concerns. Write down any answers from your doctor so you can look over them after your visit.   Tell your doctor about any changes in your body or health since your last visit.  Ask your doctor about any screening tests you need.  Where can I learn more?   [employer]  http://[email or web address]/familydoctor/en/prevention-wellness/staying-healthy/healthy-living/preventive-services-for-healthy-living.printerview.html   Centers for Disease Control  http://www.cdc.gov/family/checkup/   Last Reviewed Date   2019-04-22  Consumer Information Use and Disclaimer   This information is not specific medical advice and does not replace information you receive from your health care provider. This is only a brief summary of general information. It does NOT include all information about conditions, illnesses, injuries, tests, procedures, treatments, therapies, discharge instructions or life-style choices that may apply to you. You must talk with your health care provider for complete information about your health and treatment options. This information should not be used to decide whether or not to accept your health care providers advice, instructions or recommendations. Only your health care provider has the knowledge and training to provide advice that is right for you.  Copyright   Copyright © 2021 [employer] and its affiliates and/or licensors. All rights reserved.    Patient Education       Low Cholesterol, Saturated Fat, and Trans Fat Diet   About this topic   Cholesterol, saturated fat, and trans fat are in many foods. These may raise your blood cholesterol levels. If your cholesterol is too high, this can cause health problems in your heart, liver, kidneys, and even your eyes. The key to lowering your risk of heart problems is to lower your bad fat  "intake.  Saturated fats and trans fats are the bad fats. These fats clog your arteries and raise your bad cholesterol. Saturated fats and trans fats are solid fats at room temperature. Saturated fats are animal fats. Trans fats are manmade fats. They add flavor to a lot of packaged foods. Staying away from saturated and trans fats will help your heart.  When you do eat foods with fat, make sure they have the good fats. Monounsaturated and polyunsaturated fats are good fats. These fats help raise your good cholesterol and protect your heart.  General   How to Lower Fat and Cholesterol in Your Diet   Read the labels of the foods you buy from the market to find out how much fat is present. Under 5% of total fat on a label means it is "low fat". Over 20% of total fat on a label means it is high fat.  Eat high fiber foods, like soluble fiber. This type of fiber helps lower cholesterol in the body. Choose oatmeal, fruits (like apples), beans, and nuts to get the most soluble fiber.  Eat foods high in omega-3 fatty acids like gopal seeds, walnuts, salmon, tuna, trout, herring, flaxseed, and soybeans. These foods help keep the heart healthy.  Limit your bad fat and oil intake.  Stay away from butter, stick margarine, shortening, lard, and palm and coconut oil. Pick plant-based spreads instead.  Limit mayonnaise, salad dressings, gravies, and sauces, unless it is made from low-fat ingredients.  Limit chocolate.  Do not eat high-fat processed foods like hot dogs, feliciano, sausage, ham and other luncheon meats high in fat, and some frozen foods. Pick fish, chicken, turkey, and lean meats instead.  Eat more dried beans, lentils, and tofu to get your protein.  Do not eat organ meats, like liver.  Choose nonfat or low-fat milk, yogurt, and cheese.  Use light or fat-free cream cheese and sour cream.  Eat lots of fruits and vegetables.  Pick whole grain breads, cereals, pastas, and rice.  Do not eat snacks that are high in fats like " granola, cookies, pies, pastries, doughnuts, and croissants.  Stay away from deep fried foods.  Help When Cooking   Remove the fat portion of meats and the skin from poultry before cooking.  Bake, broil, grill, poach, or roast poultry, fish, and lean meats.  Drain and throw away the fat that drains out of meat as you cook it.  Try to add little or no fat to foods.  Use olive or canola oil for cooking or baking.  Steam your vegetables.  Use herbs or no-oil marinades to flavor foods.         Who should use this diet?   This diet is for people who are at high risk of getting health problems like heart disease, high blood pressure, diabetes, and others. This diet is also good for all people to follow to keep your heart healthy.  What foods are good to eat?   Foods with good fats are:  Canola, peanut, and olive oil  Safflower, soybean, and corn oil  Walnuts, almonds, cashews, and peanuts  Pumpkin and sunflower seeds  [place] and tuna  Tofu  Soymilk  Avocado  What foods should be limited or avoided?   Stay away from these types of foods that have saturated fats:  Whole fat dairy products like cheese, ice cream, whole milk, and cream  Palm and coconut oils  High-fat meats like beef, lamb, poultry with the skin, feliciano, and sausage  Butter and lard  Stay away from these types of foods that may have trans fat:  Cookies, cakes, candy, doughnuts, baked goods, muffins, pizza dough, and pie crusts that are packaged  Fried foods  Frozen dinners  Chips and crackers  Microwave popcorn  Stick margarine and vegetable shortenings  Helpful tips   To help stay away from saturated fat:  Pick lean cuts of meat  Take the skin off chicken and turkey or pick skinless  Pick low-fat cheese, milk, and ice cream  Use liquid oils when cooking and baking, such as olive oil and canola oil  To help stay away from trans fat:  Look at your labels. Choose foods with 0% trans fat. Read the ingredient list. Avoid foods with partially hydrogenated oil in  the ingredient list. This means there is trans fat in the product.  Where can I learn more?   American Heart Association   http://www.heart.org/HEARTORG/Conditions/Cholesterol/PreventionTreatmentofHighCholesterol/Know-Your-Fats_UC_305628_Article.jsp   Last Reviewed Date   2021-10-05  Consumer Information Use and Disclaimer   This information is not specific medical advice and does not replace information you receive from your health care provider. This is only a brief summary of general information. It does NOT include all information about conditions, illnesses, injuries, tests, procedures, treatments, therapies, discharge instructions or life-style choices that may apply to you. You must talk with your health care provider for complete information about your health and treatment options. This information should not be used to decide whether or not to accept your health care providers advice, instructions or recommendations. Only your health care provider has the knowledge and training to provide advice that is right for you.   Copyright   Copyright © 2021 [employer] and its affiliates and/or licensors. All rights reserved.

## 2024-01-22 NOTE — PROGRESS NOTES
Individual Follow-Up Form    1/22/2024    Quit Date: TBD    Clinical Status of Patient: Outpatient    Length of Service: 30 minutes    Continuing Medication: yes  Patches     Target Symptoms: Withdrawal and medication side effects. The following were  rated moderate (3) to severe (4) on TCRS:  Moderate (3): None Reported  Severe (4): None Reported    Comments: Spoke with patient at length via phone regarding tobacco cessation progress. Patient remains on prescribed tobacco cessation medication 14mg nicotine patch without any negative side effects at this time.  Patient increased to 10 cigarettes daily.  Patient will cut down to 8 cigarettes a day this week.  Reviewed strategies, habitual behavior, high risks situations, understanding urges and cravings, stress and relaxation with open discussion and additional interventions, Introduced lapses, relapses, understanding them and analyzing the situation of a lapse, conflict issues that may be linked to a lapse.  Patient stated that her nicotine cravings have increased recently due to increase levels of stress.  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  Counselor discussed increasing the nicotine patch dosage.  Counselor submitted a prescription for 21mg nicotine patch.    Patient appeared to be receptive to the information given.  Counselor will continue to motivate patient to be tobacco free.    Diagnosis: F17.200    Next Visit: 2 weeks

## 2024-01-22 NOTE — PROGRESS NOTES
Subjective:       Patient ID: Anca Grace is a 58 y.o. female.    Chief Complaint: Annual Exam    HPI: Anca is a 58 y.o. female who presents for annual wellness visit.    Feels well today but she does still suffer with pain to multiple joints and her back and neck which is chronic. Follows up monthly for muscle relaxer injections. Elavil increased to 50 mg daily, flexeril, cymbalta and still on Lyrica as well. Last seen on 05/24/23 for left shoulder pain and neck pain and she was treated with tylenol #3 and flexeril but they only help some. Also followed by Dr. Gallegos for lower back pain and he does back injections.    Now seeing Dr. Sellers for chronic bilateral knee arthritis which she had a Mri of her left knee so she will have a F/U appt to discuss left knee surgery in the future.    She is still doing wound care with Dr. Monterroso to her right shin due to her skin getting over heated and developing into blisters which bursts with clear fluid twice weekly and it is helping. She currently has ace wraps over both shins.    On Last labs her A1C was 7.4. Thyroid function was normal. Triglycerides were elevated at 273, LDL normal at  26. Annual labs ordered today.    DM II -  Chronic and uncontrolled on meds, b/p averaging < 130/80, denies s/e or a/r. Followed by [facility] for Endocrinology for her DM Type 2 and Hypothyroidism. Still takes Toujeo 120 units daily, Trulicity 4.5 weekly and takes Xigduo daily. Has Neuropathy and she takes Lyrica for it which is prescribed by Orthopedic. [facility] and all was well. She also has diabetic foot exams annually by Dr. Infante in [place].    Hyperlipidemia - controlled with a STATIN. Denies se/ar. Say she has been trying to eat healthier.    Hypothyroidism - takes levothyroxine 75 mcg daily. Thyroid function was normal on last labs. Denies se/ar to medication.    Current smoker, she has cut down to smoking 1-2 cigarettes  daily after smoking 1 ppd and has smoked for 15 years. She is back on smoking cessation which she is using the nicotine patches which are helping.    Mammogram UTD.    Had a Colonoscopy done at [facility] by Dr. Valencia in 2018 with 3 polyps found and removed, told to repeat in 3 years so she had it done in June 2023 with one polyp found but due to her not being fully cleaned out, he will repeat in 1 year.   History of Hysterectomy in 2012.    UTD with COVID and FLU vaccines.          Follow-up  Associated symptoms include arthralgias (left shoulder and neck), coughing, neck pain and a rash (both shins). Pertinent negatives include no abdominal pain, chest pain, chills, congestion, diaphoresis, fatigue, fever, headaches, nausea, numbness, vomiting or weakness.         Past Medical History:   Diagnosis Date    Diabetes mellitus, type 2     GERD (gastroesophageal reflux disease)     Hyperlipidemia     Hypertension        Past Surgical History:   Procedure Laterality Date    CARPAL TUNNEL RELEASE      HYSTERECTOMY         Family History   Problem Relation Age of Onset    Thyroiditis Mother     Heart disease Father     Graves' disease Sister     Heart disease Maternal Grandfather     Cancer Paternal Grandfather        Social History     Tobacco Use    Smoking status: Every Day     Types: Cigarettes    Smokeless tobacco: Never   Substance Use Topics    Alcohol use: Never    Drug use: Never       Patient Active Problem List   Diagnosis    Degenerative disc disease, cervical    Degenerative disc disease, lumbar    Neck pain    Bilateral arm pain    Low back pain radiating to right leg    Morbid obesity with BMI of 40.0-44.9, adult    Hyperlipidemia    Benign essential HTN    Type 2 diabetes mellitus with neurologic complication, with long-term current use of insulin    Gastroesophageal reflux disease with esophagitis without hemorrhage       Immunization History   Administered Date(s) Administered    COVID-19, MRNA, LN-S, PF  "(MODERNA FULL 0.5 ML DOSE) 10/30/2021, 11/27/2021    DTaP 1966, 01/18/1967, 03/08/1967, 04/10/1968, 03/03/1971, 08/01/1977    IPV 1966, 01/18/1967, 03/08/1967, 04/10/1968, 04/07/1978    Influenza - Quadrivalent - PF *Preferred* (6 months and older) 10/14/2021    Measles 11/07/1967    Mumps 11/13/1968    Rubella 10/21/1970    Tdap 04/13/1988, 08/06/2004           Review of Systems   Constitutional:  Negative for activity change, appetite change, chills, diaphoresis, fatigue and fever.   HENT:  Negative for congestion, ear pain, sinus pain, tinnitus and trouble swallowing.    Eyes:  Negative for pain and visual disturbance.   Respiratory:  Positive for cough. Negative for chest tightness, shortness of breath and wheezing.    Cardiovascular:  Negative for chest pain, palpitations and leg swelling.   Gastrointestinal:  Negative for abdominal distention, abdominal pain, blood in stool, constipation, diarrhea, nausea and vomiting.   Endocrine: Negative for cold intolerance, heat intolerance, polydipsia, polyphagia and polyuria.   Genitourinary:  Negative for decreased urine volume, dysuria, frequency, hematuria and urgency.   Musculoskeletal:  Positive for arthralgias (left shoulder and neck), back pain and neck pain. Negative for gait problem.   Skin:  Positive for color change (shins) and rash (both shins).   Neurological:  Negative for dizziness, syncope, weakness, light-headedness, numbness and headaches.   Hematological:  Negative for adenopathy. Does not bruise/bleed easily.   Psychiatric/Behavioral:  Negative for behavioral problems, confusion and dysphoric mood. The patient is not nervous/anxious.      Objective:     Vitals:    01/22/24 0825   BP: 130/84   BP Location: Right arm   Patient Position: Sitting   Pulse: 93   SpO2: 99%   Weight: 115.7 kg (255 lb)   Height: 5' 5" (1.651 m)       Physical Exam  Vitals and nursing note reviewed.   Constitutional:       General: She is not in acute distress.     " Appearance: Normal appearance. She is not diaphoretic.   HENT:      Head: Normocephalic and atraumatic.      Nose: Nose normal.      Mouth/Throat:      Mouth: Mucous membranes are moist.      Pharynx: Oropharynx is clear.   Eyes:      Extraocular Movements: Extraocular movements intact.      Conjunctiva/sclera: Conjunctivae normal.      Pupils: Pupils are equal, round, and reactive to light.   Neck:      Thyroid: No thyromegaly or thyroid tenderness.   Cardiovascular:      Rate and Rhythm: Normal rate and regular rhythm.      Pulses: Normal pulses.      Heart sounds: Normal heart sounds.   Pulmonary:      Effort: Pulmonary effort is normal. No tachypnea or bradypnea.      Breath sounds: Normal breath sounds. No stridor. No wheezing or rales.   Abdominal:      General: Bowel sounds are normal. There is no distension.      Palpations: Abdomen is soft.      Tenderness: There is no abdominal tenderness.   Musculoskeletal:         General: Tenderness (back and knees) present. Normal range of motion.      Cervical back: Normal range of motion and neck supple.      Right lower leg: No edema.      Left lower leg: No edema.   Lymphadenopathy:      Cervical: No cervical adenopathy.   Skin:     General: Skin is warm and dry.      Capillary Refill: Capillary refill takes less than 2 seconds.      Findings: Erythema (shins) and rash (shins) present.   Neurological:      General: No focal deficit present.      Mental Status: She is alert and oriented to person, place, and time.      Cranial Nerves: Cranial nerves 2-12 are intact.      Sensory: Sensation is intact.      Motor: Motor function is intact.      Coordination: Coordination is intact.      Gait: Gait is intact.      Deep Tendon Reflexes: Reflexes are normal and symmetric.   Psychiatric:         Mood and Affect: Mood and affect normal.         Speech: Speech normal.         Behavior: Behavior normal. Behavior is cooperative.         Thought Content: Thought content  normal.         Cognition and Memory: Cognition and memory normal.         Judgment: Judgment normal.         No visits with results within 6 Month(s) from this visit.   Latest known visit with results is:   Patient Outreach on 06/30/2023   Component Date Value Ref Range Status    CRC Recommendation External 06/27/2023 Repeat colonoscopy in 1 year   Final-Edited         Assessment:      1. Annual physical exam    2. Benign essential HTN    3. Type 2 diabetes mellitus with diabetic polyneuropathy, with long-term current use of insulin    4. Morbid obesity with BMI of 40.0-44.9, adult    5. Gastroesophageal reflux disease with esophagitis without hemorrhage    6. Mixed hyperlipidemia    7. Other specified hypothyroidism    8. Other emphysema    9. Degenerative disc disease, cervical    10. Urine frequency    11. Vitamin D deficiency    12. Need for hepatitis C screening test    13. Screening for HIV (human immunodeficiency virus)    14. Other specified hypothyroidism    15. Other ascites    16. Degenerative disc disease, lumbar    17. Other chronic back pain    18. Other insomnia    19. Seasonal allergies    20. Acute cough          Plan:     Annual physical exam  Comments:  Will review labs and determine POC based on results  Orders:  -     CBC Auto Differential; Future; Expected date: 01/22/2024  -     Comprehensive Metabolic Panel; Future; Expected date: 01/22/2024  -     Hemoglobin A1C; Future; Expected date: 01/22/2024  -     Lipid Panel; Future; Expected date: 01/22/2024  -     TSH; Future; Expected date: 01/22/2024    Benign essential HTN  -     CBC Auto Differential; Future; Expected date: 01/22/2024  -     Comprehensive Metabolic Panel; Future; Expected date: 01/22/2024  -     Lipid Panel; Future; Expected date: 01/22/2024  -     TSH; Future; Expected date: 01/22/2024    Type 2 diabetes mellitus with diabetic polyneuropathy, with long-term current use of insulin  -     Comprehensive Metabolic Panel; Future;  Expected date: 01/22/2024  -     Hemoglobin A1C; Future; Expected date: 01/22/2024  -     Microalbumin/Creatinine Ratio, urine; Future; Expected date: 01/22/2024    Morbid obesity with BMI of 40.0-44.9, adult    Gastroesophageal reflux disease with esophagitis without hemorrhage    Mixed hyperlipidemia  -     Lipid Panel; Future; Expected date: 01/22/2024    Other specified hypothyroidism  -     T4, Free; Future; Expected date: 01/22/2024  -     TSH; Future; Expected date: 01/22/2024  -     levothyroxine (SYNTHROID) 88 MCG tablet; Take 1 tablet (88 mcg total) by mouth before breakfast.  Dispense: 90 tablet; Refill: 1    Other emphysema    Degenerative disc disease, cervical  -     DULoxetine (CYMBALTA) 30 MG capsule; TAKE 1 CAPSULE BY MOUTH TWICE DAILY as needed for pain  Dispense: 180 capsule; Refill: 1    Urine frequency  -     Urinalysis, Reflex to Urine Culture Urine, Clean Catch; Future; Expected date: 01/22/2024    Vitamin D deficiency  -     Vitamin D; Future; Expected date: 01/22/2024    Need for hepatitis C screening test  -     Hepatitis C Antibody; Future; Expected date: 01/22/2024    Screening for HIV (human immunodeficiency virus)  -     HIV 1/2 Ag/Ab (4th Gen); Future; Expected date: 01/22/2024    Other specified hypothyroidism  Comments:  Continue levothyroxine daily, controlled  Orders:  -     T4, Free; Future; Expected date: 01/22/2024  -     TSH; Future; Expected date: 01/22/2024  -     levothyroxine (SYNTHROID) 88 MCG tablet; Take 1 tablet (88 mcg total) by mouth before breakfast.  Dispense: 90 tablet; Refill: 1    Other ascites  -     furosemide (LASIX) 40 MG tablet; Take 1 tablet (40 mg total) by mouth once daily.  Dispense: 90 tablet; Refill: 1    Degenerative disc disease, lumbar  -     DULoxetine (CYMBALTA) 30 MG capsule; TAKE 1 CAPSULE BY MOUTH TWICE DAILY as needed for pain  Dispense: 180 capsule; Refill: 1    Other chronic back pain  -     DULoxetine (CYMBALTA) 30 MG capsule; TAKE 1  CAPSULE BY MOUTH TWICE DAILY as needed for pain  Dispense: 180 capsule; Refill: 1    Other insomnia  -     amitriptyline (ELAVIL) 50 MG tablet; TAKE 1 TABLET BY MOUTH EVERY EVENING AS DIRECTED  Dispense: 90 tablet; Refill: 1    Seasonal allergies  -     azelastine (ASTELIN) 137 mcg (0.1 %) nasal spray; SPRAY 1 SPRAY in nose 2 TIMES A DAY thank you**ynes** :-)  Dispense: 30 mL; Refill: 2  -     Discontinue: fluticasone (VERAMYST) 27.5 mcg/actuation nasal spray; 2 sprays by Nasal route Daily.  Dispense: 9.1 mL; Refill: 2    Acute cough  -     benzonatate (TESSALON PERLES) 100 MG capsule; Take 1 capsule (100 mg total) by mouth 3 (three) times daily as needed for Cough.  Dispense: 30 capsule; Refill: 0         Current Outpatient Medications   Medication Sig Dispense Refill    albuterol (PROVENTIL/VENTOLIN HFA) 90 mcg/actuation inhaler INHALE 2 PUFFS BY MOUTH EVERY EVENING AS NEEDED for SHORTNESS OF BREATH or FOR WHEEZING thank you**ynes** :-) 6.7 g 2    ammonium lactate (LAC-HYDRIN) 12 % lotion APPLY TO AFFECTED AREA 2 TIMES A DAY thank youmike -)      atorvastatin (LIPITOR) 80 MG tablet Take 1 tablet (80 mg total) by mouth every evening. 90 tablet 3    cholecalciferol, vitamin D3, 1,250 mcg (50,000 unit) capsule TAKE 1 CAPSULE BY MOUTH WEEKLY AS DIRECTED 12 capsule 2    COMBIVENT RESPIMAT  mcg/actuation inhaler Rescue 4 g 3    cyclobenzaprine (FLEXERIL) 10 MG tablet TAKE 1 TABLET BY MOUTH 3 TIMES A DAY AS NEEDED FOR MUSCLE SPASMS MAY MAKE DROWSY      estradioL (ESTRACE) 1 MG tablet Take 1 tablet (1 mg total) by mouth once daily. 90 tablet 3    folic acid (FOLVITE) 1 MG tablet TAKE 1 TABLET BY MOUTH DAILY. 90 tablet 3    loratadine (CLARITIN) 10 mg tablet Take 10 mg by mouth.      losartan (COZAAR) 100 MG tablet Take 1 tablet (100 mg total) by mouth once daily. 90 tablet 3    meloxicam (MOBIC) 7.5 MG tablet TAKE 1 TABLET BY MOUTH 2 TIMES A DAY FOR 10 TO 14 DAYS WITH FOOD AND REST FOR 2 TO 4 DAYS AND CONTINUE IF  "PAIN REOCCURS      MOUNJARO 15 mg/0.5 mL PnIj Inject 15 mg into the skin once a week. 4 pen 2    nicotine (NICODERM CQ) 14 mg/24 hr Place 1 patch onto the skin once daily. 28 patch 1    potassium chloride SA (K-DUR,KLOR-CON) 20 MEQ tablet TAKE 1 TABLET BY MOUTH DAILY 30 tablet 2    TOUJEO MAX U-300 SOLOSTAR 300 unit/mL (3 mL) insulin pen INJECT 100 UNITS SUBCUTANEOUS once DAILY      TRUEPLUS PEN NEEDLE 32 gauge x 5/32" Ndle USE DAILY OR AS DIRECTED      XIGDUO XR 5-1,000 mg TBph TAKE 2 TABLETS BY MOUTH EACH MORNING      acetaminophen-codeine 300-30mg (TYLENOL #3) 300-30 mg Tab TAKE 1 TABLET BY MOUTH EVERY 6 HOURS AS NEEDED MAY MAKE DROWSY      amitriptyline (ELAVIL) 50 MG tablet TAKE 1 TABLET BY MOUTH EVERY EVENING AS DIRECTED 90 tablet 1    azelastine (ASTELIN) 137 mcg (0.1 %) nasal spray SPRAY 1 SPRAY in nose 2 TIMES A DAY thank you**ynes** :-) 30 mL 2    benzonatate (TESSALON PERLES) 100 MG capsule Take 1 capsule (100 mg total) by mouth 3 (three) times daily as needed for Cough. 30 capsule 0    diclofenac (VOLTAREN) 75 MG EC tablet Take 75 mg by mouth 2 (two) times daily.      DULoxetine (CYMBALTA) 30 MG capsule TAKE 1 CAPSULE BY MOUTH TWICE DAILY as needed for pain 180 capsule 1    fluticasone propionate (FLONASE) 50 mcg/actuation nasal spray 1 spray (50 mcg total) by Each Nostril route once daily. 16 g 2    furosemide (LASIX) 40 MG tablet Take 1 tablet (40 mg total) by mouth once daily. 90 tablet 1    levothyroxine (SYNTHROID) 88 MCG tablet Take 1 tablet (88 mcg total) by mouth before breakfast. 90 tablet 1     No current facility-administered medications for this visit.       Medications Discontinued During This Encounter   Medication Reason    pregabalin (LYRICA) 300 MG Cap Alternate therapy    dulaglutide (TRULICITY) 4.5 mg/0.5 mL pen injector Alternate therapy    omeprazole (PRILOSEC) 20 MG capsule Alternate therapy    varenicline (CHANTIX) 1 mg Tab Alternate therapy    fluticasone (VERAMYST) 27.5 " mcg/actuation nasal spray Reorder    azelastine (ASTELIN) 137 mcg (0.1 %) nasal spray Reorder    DULoxetine (CYMBALTA) 30 MG capsule Reorder    amitriptyline (ELAVIL) 50 MG tablet Reorder    levothyroxine (SYNTHROID) 88 MCG tablet Reorder    furosemide (LASIX) 40 MG tablet Reorder       Health Maintenance   Topic Date Due    Eye Exam  Never done    TETANUS VACCINE  08/06/2014    Shingles Vaccine (1 of 2) Never done    Colorectal Cancer Screening  06/27/2024    Mammogram  07/07/2024    Hemoglobin A1c  07/23/2024    Foot Exam  09/11/2024    Low Dose Statin  01/22/2025    Lipid Panel  01/23/2025    Hepatitis C Screening  Completed       Patient Instructions   RTC in 3 months for F/U or sooner if needed.    Keep appts with specialists as scheduled.    Patient Education       Yearly Physical for Adults   About this topic   Most people do not want to be sick. Having a checkup each year with your doctor is one way to help you stay healthy. You may need to see your doctor more or less often. How often you need to go to the doctor depends on your age. Your family and medical history also play a role in how often you need to go to the doctor. Going to see your doctor on a routine basis can help you find problems early or even before they start. This may make it easier to treat or cure your problem.  General   Your doctor will talk about many things during your checkup. Your doctor may ask about:  Your medical and family history.  All the drugs you are taking. Be sure to include all prescription, over the counter, and herbal supplements. Tell the doctor if you have any drug allergy. Bring a list of drugs you take with you.  How you are feeling and if you are having any problems.  Risky behaviors like smoking, drinking alcohol, using illegal drugs, not wearing seatbelts, having unprotected sex, etc.  Your doctor will do a physical exam and may check your:  Height and weight  Blood  pressure  Reflexes  Memory  Vision  Hearing  Your doctor may order:  Lab tests  ECG to check your heart rhythm  X-rays  Tests or treatments based on your exam  What lifestyle changes are needed?   Your doctor may suggest you make changes to your lifestyle at this visit. The doctor may talk with you about being more active or lowering stress levels. Ask your doctor what you need to do.  What drugs may be needed?   Your doctor may order drugs or vaccines to protect you from illnesses.  What changes to diet are needed?   Talk to your doctor to see if any changes are needed to your diet.  When do I need to call the doctor?   Call your doctor if you need to learn about any test results. Together you can make a plan for more care.  Helpful tips   Make a list of questions for your doctor before you go. This will help you remember to ask about any concerns. Write down any answers from your doctor so you can look over them after your visit.   Tell your doctor about any changes in your body or health since your last visit.  Ask your doctor about any screening tests you need.  Where can I learn more?   American Academy [employer]  http://familydoctor.org/familydoctor/en/prevention-wellness/staying-healthy/healthy-living/preventive-services-for-healthy-living.printerview.html   Centers for Disease Control  http://www.cdc.gov/family/checkup/   Last Reviewed Date   2019-04-22  Consumer Information Use and Disclaimer   This information is not specific medical advice and does not replace information you receive from your health care provider. This is only a brief summary of general information. It does NOT include all information about conditions, illnesses, injuries, tests, procedures, treatments, therapies, discharge instructions or life-style choices that may apply to you. You must talk with your health care provider for complete information about your health and treatment options. This information should not be used  "to decide whether or not to accept your health care providers advice, instructions or recommendations. Only your health care provider has the knowledge and training to provide advice that is right for you.  Copyright   Copyright © 2021 [employer] and its affiliates and/or licensors. All rights reserved.    Patient Education       Low Cholesterol, Saturated Fat, and Trans Fat Diet   About this topic   Cholesterol, saturated fat, and trans fat are in many foods. These may raise your blood cholesterol levels. If your cholesterol is too high, this can cause health problems in your heart, liver, kidneys, and even your eyes. The key to lowering your risk of heart problems is to lower your bad fat intake.  Saturated fats and trans fats are the bad fats. These fats clog your arteries and raise your bad cholesterol. Saturated fats and trans fats are solid fats at room temperature. Saturated fats are animal fats. Trans fats are manmade fats. They add flavor to a lot of packaged foods. Staying away from saturated and trans fats will help your heart.  When you do eat foods with fat, make sure they have the good fats. Monounsaturated and polyunsaturated fats are good fats. These fats help raise your good cholesterol and protect your heart.  General   How to Lower Fat and Cholesterol in Your Diet   Read the labels of the foods you buy from the market to find out how much fat is present. Under 5% of total fat on a label means it is "low fat". Over 20% of total fat on a label means it is high fat.  Eat high fiber foods, like soluble fiber. This type of fiber helps lower cholesterol in the body. Choose oatmeal, fruits (like apples), beans, and nuts to get the most soluble fiber.  Eat foods high in omega-3 fatty acids like gopal seeds, walnuts, salmon, tuna, trout, herring, flaxseed, and soybeans. These foods help keep the heart healthy.  Limit your bad fat and oil intake.  Stay away from butter, stick margarine, shortening, " lard, and palm and coconut oil. Pick plant-based spreads instead.  Limit mayonnaise, salad dressings, gravies, and sauces, unless it is made from low-fat ingredients.  Limit chocolate.  Do not eat high-fat processed foods like hot dogs, feliciano, sausage, ham and other luncheon meats high in fat, and some frozen foods. Pick fish, chicken, turkey, and lean meats instead.  Eat more dried beans, lentils, and tofu to get your protein.  Do not eat organ meats, like liver.  Choose nonfat or low-fat milk, yogurt, and cheese.  Use light or fat-free cream cheese and sour cream.  Eat lots of fruits and vegetables.  Pick whole grain breads, cereals, pastas, and rice.  Do not eat snacks that are high in fats like granola, cookies, pies, pastries, doughnuts, and croissants.  Stay away from deep fried foods.  Help When Cooking   Remove the fat portion of meats and the skin from poultry before cooking.  Bake, broil, grill, poach, or roast poultry, fish, and lean meats.  Drain and throw away the fat that drains out of meat as you cook it.  Try to add little or no fat to foods.  Use olive or canola oil for cooking or baking.  Steam your vegetables.  Use herbs or no-oil marinades to flavor foods.         Who should use this diet?   This diet is for people who are at high risk of getting health problems like heart disease, high blood pressure, diabetes, and others. This diet is also good for all people to follow to keep your heart healthy.  What foods are good to eat?   Foods with good fats are:  Canola, peanut, and olive oil  Safflower, soybean, and corn oil  Walnuts, almonds, cashews, and peanuts  Pumpkin and sunflower seeds  Curtis and tuna  Tofu  Soymilk  Avocado  What foods should be limited or avoided?   Stay away from these types of foods that have saturated fats:  Whole fat dairy products like cheese, ice cream, whole milk, and cream  Palm and coconut oils  High-fat meats like beef, lamb, poultry with the skin, feliciano, and  sausage  Butter and lard  Stay away from these types of foods that may have trans fat:  Cookies, cakes, candy, doughnuts, baked goods, muffins, pizza dough, and pie crusts that are packaged  Fried foods  Frozen dinners  Chips and crackers  Microwave popcorn  Stick margarine and vegetable shortenings  Helpful tips   To help stay away from saturated fat:  Pick lean cuts of meat  Take the skin off chicken and turkey or pick skinless  Pick low-fat cheese, milk, and ice cream  Use liquid oils when cooking and baking, such as olive oil and canola oil  To help stay away from trans fat:  Look at your labels. Choose foods with 0% trans fat. Read the ingredient list. Avoid foods with partially hydrogenated oil in the ingredient list. This means there is trans fat in the product.  Where can I learn more?   American Heart Association   http://www.heart.org/HEARTORG/Conditions/Cholesterol/PreventionTreatmentofHighCholesterol/Know-Your-Fats_[facility]_305628_Article.jsp   Last Reviewed Date   2021-10-05  Consumer Information Use and Disclaimer   This information is not specific medical advice and does not replace information you receive from your health care provider. This is only a brief summary of general information. It does NOT include all information about conditions, illnesses, injuries, tests, procedures, treatments, therapies, discharge instructions or life-style choices that may apply to you. You must talk with your health care provider for complete information about your health and treatment options. This information should not be used to decide whether or not to accept your health care providers advice, instructions or recommendations. Only your health care provider has the knowledge and training to provide advice that is right for you.   Copyright   Copyright © 2021 [employer] and its affiliates and/or licensors. All rights reserved.        Risks, benefits, and alternatives discussed with patient, Patient verbalized  understanding of discussed plan of care. Asked patient if any further questions, answered no.    Future Appointments   Date Time Provider Department Center   1/29/2024 10:00 AM Ellie Braswell, [facility] [facility] [place]   4/25/2024 11:00 AM Yaima Pulliam, NP [facility] PRIC[facility]5 [facility] Pulliam, NP

## 2024-01-23 ENCOUNTER — CLINICAL SUPPORT (OUTPATIENT)
Dept: OBSTETRICS AND GYNECOLOGY | Facility: CLINIC | Age: 59
End: 2024-01-23
Payer: MEDICARE

## 2024-01-23 DIAGNOSIS — J30.2 SEASONAL ALLERGIES: Primary | ICD-10-CM

## 2024-01-23 DIAGNOSIS — Z01.89 ROUTINE LAB DRAW: Primary | ICD-10-CM

## 2024-01-23 LAB
25(OH)D3 SERPL-MCNC: 94 NG/ML
ABS NRBC COUNT: 0 THOU/UL (ref 0–0.01)
ABSOLUTE BASOPHIL: 0.1 10*3/UL (ref 0–0.3)
ABSOLUTE EOSINOPHIL: 0.5 10*3/UL (ref 0–0.6)
ABSOLUTE IMMATURE GRAN: 0.11 THOU/UL (ref 0–0.03)
ABSOLUTE LYMPHOCYTE: 2.4 10*3/UL (ref 1.2–4)
ABSOLUTE MONOCYTE: 1.1 10*3/UL (ref 0.1–0.8)
ALBUMIN SERPL BCP-MCNC: 3.7 G/DL (ref 3.4–5)
ALP SERPL-CCNC: 132 U/L (ref 45–117)
ALT SERPL W P-5'-P-CCNC: 37 U/L (ref 13–56)
ANION GAP SERPL CALC-SCNC: 6 MMOL/L (ref 3–11)
AST SERPL-CCNC: 21 U/L (ref 15–37)
BASOPHILS NFR BLD: 0.8 % (ref 0–3)
BILIRUB SERPL-MCNC: 0.3 MG/DL (ref 0.2–1)
BUN SERPL-MCNC: 25 MG/DL (ref 7–18)
BUN/CREAT SERPL: 21.55 RATIO
CALCIUM SERPL-MCNC: 9.4 MG/DL (ref 8.5–10.1)
CHLORIDE SERPL-SCNC: 99 MMOL/L (ref 98–107)
CHOLEST SERPL-MSCNC: 170 MG/DL
CO2 SERPL-SCNC: 30 MMOL/L (ref 21–32)
CREAT SERPL-MCNC: 1.16 MG/DL (ref 0.55–1.02)
EOSINOPHIL NFR BLD: 4.3 % (ref 0–6)
ERYTHROCYTE [DISTWIDTH] IN BLOOD BY AUTOMATED COUNT: 16.4 % (ref 0–15.5)
ESTIMATED AVG GLUCOSE: 165 MG/DL
GFR ESTIMATION: 48
GLUCOSE SERPL-MCNC: 211 MG/DL (ref 74–106)
HBA1C MFR BLD: 6.8 % (ref 4.2–6.3)
HCT VFR BLD AUTO: 38.8 % (ref 37–47)
HCV AB SERPL QL IA: NONREACTIVE
HDLC SERPL-MCNC: 41 MG/DL
HGB BLD-MCNC: 12.7 G/DL (ref 12–16)
HIV-1 AND HIV-2 ANTIBODIES: NEGATIVE
IMMATURE GRANULOCYTES: 1 % (ref 0–0.43)
LDLC SERPL CALC-MCNC: 54.8 MG/DL
LYMPHOCYTES NFR BLD: 21 % (ref 20–45)
MCH RBC QN AUTO: 28 PG (ref 27–32)
MCHC RBC AUTO-ENTMCNC: 32.7 % (ref 32–36)
MCV RBC AUTO: 85.7 FL (ref 80–99)
MONOCYTES NFR BLD: 9.8 % (ref 2–10)
NEUTROPHILS # BLD AUTO: 7.1 10*3/UL (ref 1.4–7)
NEUTROPHILS NFR BLD: 63.1 % (ref 50–80)
NUCLEATED RED BLOOD CELLS: 0 % (ref 0–0.2)
PLATELETS: 258 10*3/UL (ref 130–400)
PMV BLD AUTO: 12.7 FL (ref 9.2–12.2)
POTASSIUM SERPL-SCNC: 4.6 MMOL/L (ref 3.5–5.1)
PROT SERPL-MCNC: 7.1 G/DL (ref 6.4–8.2)
RBC # BLD AUTO: 4.53 10*6/UL (ref 4.2–5.4)
SODIUM BLD-SCNC: 135 MMOL/L (ref 131–143)
T4 FREE SP9 P CHAL SERPL-SCNC: 1.2 NG/DL (ref 0.76–1.46)
TRIGL SERPL-MCNC: 371 MG/DL (ref 0–149)
TSH SERPL DL<=0.005 MIU/L-ACNC: 1.75 UIU/ML (ref 0.36–3.74)
VLDL CHOLESTEROL: 74 MG/DL
WBC # BLD: 11.2 10*3/UL (ref 4.5–10)

## 2024-01-23 PROCEDURE — 36415 COLL VENOUS BLD VENIPUNCTURE: CPT | Mod: S$GLB,,, | Performed by: NURSE PRACTITIONER

## 2024-01-23 RX ORDER — FLUTICASONE PROPIONATE 50 MCG
1 SPRAY, SUSPENSION (ML) NASAL DAILY
Qty: 16 G | Refills: 2 | Status: SHIPPED | OUTPATIENT
Start: 2024-01-23 | End: 2024-04-24

## 2024-01-25 RX ORDER — IBUPROFEN 200 MG
1 TABLET ORAL DAILY
Qty: 28 PATCH | Refills: 1 | Status: SHIPPED | OUTPATIENT
Start: 2024-01-25 | End: 2024-04-25

## 2024-01-29 ENCOUNTER — CLINICAL SUPPORT (OUTPATIENT)
Dept: SMOKING CESSATION | Facility: CLINIC | Age: 59
End: 2024-01-29
Payer: COMMERCIAL

## 2024-01-29 DIAGNOSIS — E55.9 VITAMIN D DEFICIENCY: ICD-10-CM

## 2024-01-29 DIAGNOSIS — F17.200 NICOTINE DEPENDENCE: Primary | ICD-10-CM

## 2024-01-29 DIAGNOSIS — E87.6 LOW BLOOD POTASSIUM: ICD-10-CM

## 2024-01-29 DIAGNOSIS — E28.39 OTHER PRIMARY OVARIAN FAILURE: ICD-10-CM

## 2024-01-29 DIAGNOSIS — E78.2 MIXED HYPERLIPIDEMIA: ICD-10-CM

## 2024-01-29 DIAGNOSIS — R06.02 SOB (SHORTNESS OF BREATH): ICD-10-CM

## 2024-01-29 DIAGNOSIS — Z79.4 TYPE 2 DIABETES MELLITUS WITH DIABETIC POLYNEUROPATHY, WITH LONG-TERM CURRENT USE OF INSULIN: Primary | ICD-10-CM

## 2024-01-29 DIAGNOSIS — E53.8 FOLATE DEFICIENCY: ICD-10-CM

## 2024-01-29 DIAGNOSIS — I10 ESSENTIAL (PRIMARY) HYPERTENSION: ICD-10-CM

## 2024-01-29 DIAGNOSIS — M50.30 DEGENERATIVE DISC DISEASE, CERVICAL: ICD-10-CM

## 2024-01-29 DIAGNOSIS — J43.8 OTHER EMPHYSEMA: Chronic | ICD-10-CM

## 2024-01-29 DIAGNOSIS — E11.42 TYPE 2 DIABETES MELLITUS WITH DIABETIC POLYNEUROPATHY, WITH LONG-TERM CURRENT USE OF INSULIN: Primary | ICD-10-CM

## 2024-01-29 DIAGNOSIS — R06.2 WHEEZING: ICD-10-CM

## 2024-01-29 DIAGNOSIS — M62.830 MUSCLE SPASM OF BACK: ICD-10-CM

## 2024-01-29 PROCEDURE — 90853 GROUP PSYCHOTHERAPY: CPT | Mod: S$GLB,,, | Performed by: GENERAL PRACTICE

## 2024-01-29 RX ORDER — CYCLOBENZAPRINE HCL 10 MG
TABLET ORAL
Qty: 90 TABLET | Refills: 0 | Status: SHIPPED | OUTPATIENT
Start: 2024-01-29 | End: 2024-02-27

## 2024-01-29 RX ORDER — MELOXICAM 15 MG/1
TABLET ORAL
Refills: 0 | OUTPATIENT
Start: 2024-01-29

## 2024-01-29 RX ORDER — FOLIC ACID 1 MG/1
TABLET ORAL
Refills: 0 | OUTPATIENT
Start: 2024-01-29

## 2024-01-29 RX ORDER — LOSARTAN POTASSIUM 100 MG/1
100 TABLET ORAL DAILY
Qty: 90 TABLET | Refills: 3 | Status: SHIPPED | OUTPATIENT
Start: 2024-01-29

## 2024-01-29 RX ORDER — ALBUTEROL SULFATE 90 UG/1
AEROSOL, METERED RESPIRATORY (INHALATION)
Qty: 18 G | Refills: 2 | Status: SHIPPED | OUTPATIENT
Start: 2024-01-29

## 2024-01-29 RX ORDER — MELOXICAM 7.5 MG/1
TABLET ORAL
Qty: 180 TABLET | Refills: 3 | Status: SHIPPED | OUTPATIENT
Start: 2024-01-29

## 2024-01-29 RX ORDER — ATORVASTATIN CALCIUM 80 MG/1
TABLET, FILM COATED ORAL
Refills: 0 | OUTPATIENT
Start: 2024-01-29

## 2024-01-29 RX ORDER — IPRATROPIUM BROMIDE AND ALBUTEROL 20; 100 UG/1; UG/1
SPRAY, METERED RESPIRATORY (INHALATION)
Refills: 0 | OUTPATIENT
Start: 2024-01-29

## 2024-01-29 RX ORDER — ASPIRIN 325 MG
TABLET, DELAYED RELEASE (ENTERIC COATED) ORAL
Qty: 12 CAPSULE | Refills: 1 | Status: SHIPPED | OUTPATIENT
Start: 2024-01-29

## 2024-01-29 RX ORDER — LOSARTAN POTASSIUM 100 MG/1
TABLET ORAL
Refills: 0 | OUTPATIENT
Start: 2024-01-29

## 2024-01-29 RX ORDER — ERGOCALCIFEROL 1.25 MG/1
CAPSULE ORAL
Refills: 0 | OUTPATIENT
Start: 2024-01-29

## 2024-01-29 RX ORDER — ATORVASTATIN CALCIUM 80 MG/1
80 TABLET, FILM COATED ORAL NIGHTLY
Qty: 90 TABLET | Refills: 3 | Status: SHIPPED | OUTPATIENT
Start: 2024-01-29

## 2024-01-29 RX ORDER — DAPAGLIFLOZIN AND METFORMIN HYDROCHLORIDE 5; 1000 MG/1; MG/1
TABLET, FILM COATED, EXTENDED RELEASE ORAL
Qty: 180 TABLET | Refills: 3 | Status: SHIPPED | OUTPATIENT
Start: 2024-01-29

## 2024-01-29 RX ORDER — IPRATROPIUM BROMIDE AND ALBUTEROL 20; 100 UG/1; UG/1
SPRAY, METERED RESPIRATORY (INHALATION)
Qty: 12 G | Refills: 3 | Status: SHIPPED | OUTPATIENT
Start: 2024-01-29 | End: 2024-02-07 | Stop reason: SDUPTHER

## 2024-01-29 RX ORDER — ESTRADIOL 1 MG/1
TABLET ORAL
Refills: 0 | OUTPATIENT
Start: 2024-01-29

## 2024-01-29 RX ORDER — CYCLOBENZAPRINE HCL 10 MG
TABLET ORAL
Refills: 0 | OUTPATIENT
Start: 2024-01-29

## 2024-01-29 RX ORDER — PREGABALIN 300 MG/1
300 CAPSULE ORAL 2 TIMES DAILY
Qty: 180 CAPSULE | Refills: 3 | Status: SHIPPED | OUTPATIENT
Start: 2024-01-29

## 2024-01-29 RX ORDER — ALBUTEROL SULFATE 90 UG/1
AEROSOL, METERED RESPIRATORY (INHALATION)
Refills: 0 | OUTPATIENT
Start: 2024-01-29

## 2024-01-29 RX ORDER — POTASSIUM CHLORIDE 20 MEQ/1
TABLET, EXTENDED RELEASE ORAL
Refills: 0 | OUTPATIENT
Start: 2024-01-29

## 2024-01-29 RX ORDER — FOLIC ACID 1 MG/1
TABLET ORAL
Qty: 90 TABLET | Refills: 3 | Status: SHIPPED | OUTPATIENT
Start: 2024-01-29

## 2024-01-29 RX ORDER — DAPAGLIFLOZIN AND METFORMIN HYDROCHLORIDE 5; 1000 MG/1; MG/1
TABLET, FILM COATED, EXTENDED RELEASE ORAL
Refills: 0 | OUTPATIENT
Start: 2024-01-29

## 2024-01-29 RX ORDER — ESTRADIOL 1 MG/1
1 TABLET ORAL DAILY
Qty: 90 TABLET | Refills: 3 | Status: SHIPPED | OUTPATIENT
Start: 2024-01-29

## 2024-01-29 RX ORDER — POTASSIUM CHLORIDE 20 MEQ/1
20 TABLET, EXTENDED RELEASE ORAL NIGHTLY
Qty: 90 TABLET | Refills: 1 | Status: SHIPPED | OUTPATIENT
Start: 2024-01-29

## 2024-01-29 NOTE — PROGRESS NOTES
Individual Follow-Up Form    1/29/2024    Quit Date: TBD    Clinical Status of Patient: Outpatient    Length of Service: 30 minutes    Continuing Medication: yes  Patches     Target Symptoms: Withdrawal and medication side effects. The following were  rated moderate (3) to severe (4) on TCRS:  Moderate (3): None Reported  Severe (4): None Reported    Comments: Spoke with patient at length via phone regarding tobacco cessation progress. Patient remains on prescribed tobacco cessation medication 14mg nicotine patch without any negative side effects at this time.  Patient remains smoking 10 cigarettes per day.  Patient will cut down to 8 cigarettes a day this week.  Reviewed strategies, habitual behavior, high risks situations, understanding urges and cravings, stress and relaxation with open discussion and additional interventions, Introduced lapses, relapses, understanding them and analyzing the situation of a lapse, conflict issues that may be linked to a lapse.  Counselor discussed the 4ds (delay, drink, distract, deep breathing) to address nicotine cravings.  Patient is awaiting for her 21mg nicotine patches to arrive from mail out pharmacy.  Counselor also suggested that patient utilize jolly ranchers, dum dum lollipops, and toffee candy to help control nicotine cravings.  Patient appeared to be receptive to the information given.  Counselor will continue to motivate patient to be tobacco free.    Diagnosis: F17.200    Next Visit: 1 week

## 2024-02-05 NOTE — PROGRESS NOTES
Please notify patient her cholesterol is elevated so be sure to take her cholesterol medication every day along with eating a low cholesterol diet and exercise regularly. Also, let her know great job on the blood sugar, her A1C is down to 6.8 now. All of her other labs are within normal range.

## 2024-02-06 ENCOUNTER — TELEPHONE (OUTPATIENT)
Dept: PRIMARY CARE CLINIC | Facility: CLINIC | Age: 59
End: 2024-02-06
Payer: MEDICARE

## 2024-02-06 ENCOUNTER — CLINICAL SUPPORT (OUTPATIENT)
Dept: SMOKING CESSATION | Facility: CLINIC | Age: 59
End: 2024-02-06
Payer: COMMERCIAL

## 2024-02-06 DIAGNOSIS — F17.200 NICOTINE DEPENDENCE: Primary | ICD-10-CM

## 2024-02-06 PROCEDURE — 99404 PREV MED CNSL INDIV APPRX 60: CPT | Mod: S$GLB,,, | Performed by: GENERAL PRACTICE

## 2024-02-06 NOTE — PROGRESS NOTES
Individual Follow-Up Form    2/6/2024    Quit Date: TBD    Clinical Status of Patient: Outpatient    Length of Service: 60 minutes    Continuing Medication: yes  Patches     Target Symptoms: Withdrawal and medication side effects. The following were  rated moderate (3) to severe (4) on TCRS:  Moderate (3): None Reported  Severe (4): None Reported    Comments: Spoke with patient at length in clinic regarding tobacco cessation progress. Patient remains on prescribed tobacco cessation medication 21mg nicotine patch without any negative side effects at this time.  Patient decreased to 8 cigarettes daily.  Patient will cut down to 6-7 cigarettes a day this week.  Reviewed strategies, habitual behavior, stress, and high risk situations. Introduced stress with addition interventions, SOLVE, relaxation with interventions, nutrition, exercise, weight gain, and the importance of rewarding oneself for accomplishments toward becoming tobacco free. Open discussion of all items with interventions.  Counselor discussed the 4ds (delay, drink, distract, deep breathing) to address nicotine cravings.  Counselor encouraged patient to use her nicotine patches more consistently.  Patient states that eating ice to help with her nicotine cravings and help address her hand to mouth habit.  Patient appeared to be receptive to the information given.  Counselor will continue to motivate patient to be tobacco free.    Diagnosis: F17.200    Next Visit: 1 week

## 2024-02-07 ENCOUNTER — TELEPHONE (OUTPATIENT)
Dept: PRIMARY CARE CLINIC | Facility: CLINIC | Age: 59
End: 2024-02-07
Payer: MEDICARE

## 2024-02-07 DIAGNOSIS — J43.8 OTHER EMPHYSEMA: Chronic | ICD-10-CM

## 2024-02-07 DIAGNOSIS — R06.2 WHEEZING: Primary | ICD-10-CM

## 2024-02-07 DIAGNOSIS — R06.02 SOB (SHORTNESS OF BREATH): ICD-10-CM

## 2024-02-07 RX ORDER — IPRATROPIUM BROMIDE AND ALBUTEROL 20; 100 UG/1; UG/1
1 SPRAY, METERED RESPIRATORY (INHALATION) EVERY 6 HOURS PRN
Qty: 12 G | Refills: 3 | Status: SHIPPED | OUTPATIENT
Start: 2024-02-07

## 2024-02-07 NOTE — TELEPHONE ENCOUNTER
----- Message from Waleska Capps MA sent at 2/7/2024  4:22 PM CST -----    ----- Message -----  From: Fay Choudhury  Sent: 2/6/2024   4:04 PM CST  To: [facility]    Type:  Needs Medical Advice    Who Called: Sirisha kulkarni/ Jeremy Carlos   Symptoms (please be specific): -   How long has patient had these symptoms:  -  Pharmacy name and phone #:  -  Would the patient rather a call back or a response via [facility]?    Best Call Back Number: [phone]    Additional Information: needs to speak w/ nurse to clarify directions on medication COMBIVENT RESPIMAT  mcg/actuation inhaler please call

## 2024-02-13 ENCOUNTER — TELEPHONE (OUTPATIENT)
Dept: SMOKING CESSATION | Facility: CLINIC | Age: 59
End: 2024-02-13
Payer: MEDICARE

## 2024-02-13 ENCOUNTER — CLINICAL SUPPORT (OUTPATIENT)
Dept: SMOKING CESSATION | Facility: CLINIC | Age: 59
End: 2024-02-13
Payer: COMMERCIAL

## 2024-02-13 DIAGNOSIS — F17.200 NICOTINE DEPENDENCE: Primary | ICD-10-CM

## 2024-02-13 PROCEDURE — 99402 PREV MED CNSL INDIV APPRX 30: CPT | Mod: S$GLB,,, | Performed by: GENERAL PRACTICE

## 2024-02-13 NOTE — TELEPHONE ENCOUNTER
Attempted to contact patient to conduct scheduled follow up appointment.  Patient did not answer.  Counselor left a voice message with name and contact information.

## 2024-02-13 NOTE — PROGRESS NOTES
Individual Follow-Up Form    2/13/2024    Quit Date: TBD    Clinical Status of Patient: Outpatient    Length of Service: 30 minutes    Continuing Medication: yes  Patches     Target Symptoms: Withdrawal and medication side effects. The following were  rated moderate (3) to severe (4) on TCRS:  Moderate (3): None Reported   Severe (4): None Reported    Comments: Spoke with patient at length via phone regarding tobacco cessation progress. Patient remains on prescribed tobacco cessation medication 21mg nicotine patch without any negative side effects at this time.  Patient decreased to 7-8 cigarettes daily.  Patient will cut down to 6 cigarettes a day this week.  Reviewed strategies, habitual behavior, stress, and high risk situations. Introduced stress with addition interventions, SOLVE, relaxation with interventions, nutrition, exercise, weight gain, and the importance of rewarding oneself for accomplishments toward becoming tobacco free. Open discussion of all items with interventions.  Counselor discussed the 4ds (delay, drink, distract, deep breathing) to address nicotine cravings.  Patient states she is eating ice to address the hand to mouth habit associated with smoking.  Patient also states that she moved in her new home and that has helped decrease some of her stress.  Patient is preparing for upcoming surgery.  Counselor discussed the benefits of being tobacco free during recovery.  Patient expressed that she started using her nicotine patches more consistently and has seen an improvement in her quit.  Patient appeared to be receptive to the information given.  Counselor will continue to motivate patient to be tobacco free.    Diagnosis: F17.200    Next Visit: 1 week

## 2024-02-19 ENCOUNTER — TELEPHONE (OUTPATIENT)
Dept: PRIMARY CARE CLINIC | Facility: CLINIC | Age: 59
End: 2024-02-19
Payer: MEDICARE

## 2024-02-19 NOTE — LETTER
February 19, 2024      [facility]  [address] [place] [address], [address]  [facility] [address]  Phone: [phone]  Fax: [phone]       Patient: Anca Grace   YOB: 1965    The above patient is cleared for surgery to be done by Dr. Sellers. She is a low risk for a moderate risk surgery. All preoperative test results have been reviewed and are within acceptable range. If you have any questions or concerns, or if I can be of further assistance, please do not hesitate to contact me.    Sincerely,      Yaima Pulliam, NP

## 2024-02-19 NOTE — TELEPHONE ENCOUNTER
Please let patient know I received a preoperative clearance for her knee surgery with Dr. Sellers and I have cleared her for surgery. Please fax the preop clearance to Dr. Sellers's office. Thanks a bunch.

## 2024-02-20 ENCOUNTER — CLINICAL SUPPORT (OUTPATIENT)
Dept: SMOKING CESSATION | Facility: CLINIC | Age: 59
End: 2024-02-20
Payer: COMMERCIAL

## 2024-02-20 DIAGNOSIS — F17.200 NICOTINE DEPENDENCE: Primary | ICD-10-CM

## 2024-02-20 PROCEDURE — 99403 PREV MED CNSL INDIV APPRX 45: CPT | Mod: S$GLB,,, | Performed by: GENERAL PRACTICE

## 2024-02-20 NOTE — PROGRESS NOTES
Individual Follow-Up Form    2/20/2024    Quit Date: TBD    Clinical Status of Patient: Outpatient    Length of Service: 45 minutes    Continuing Medication: yes  Patches     Target Symptoms: Withdrawal and medication side effects. The following were  rated moderate (3) to severe (4) on TCRS:  Moderate (3): None Reported  Severe (4): None Reported    Comments: Spoke with patient at length in clinic regarding tobacco cessation progress. Patient remains on prescribed tobacco cessation medication 21mg nicotine patch without any negative side effects at this time.  Patient decreased to 7 cigarettes daily.  Patient states one day last week she smoked 6 cigarettes.    Patient will cut down to 5 cigarettes a day this week.   Reviewed strategies, controlling environment, cues, triggers, new goals set. Introduced high risk situations with preparation interventions, caffeine similarities with withdrawal issues of habit and nicotine, Understanding urges, cravings, stress and relaxation. Open discussion with intervention discussion.  Counselor suggested that patient utilize jolly ranchers, dum dum lollipops, and toffee candy to help control nicotine cravings.  Counselor recommended that patient stay hydrated and eat fruits and vegetables to help decrease nicotine cravings.   Patient appeared to be receptive to the information given.  Counselor will continue to motivate patient to be tobacco free.       Diagnosis: F17.200    Next Visit: 1 week

## 2024-02-27 DIAGNOSIS — M62.830 MUSCLE SPASM OF BACK: ICD-10-CM

## 2024-02-27 RX ORDER — CYCLOBENZAPRINE HCL 10 MG
TABLET ORAL
Qty: 90 TABLET | Refills: 11 | Status: SHIPPED | OUTPATIENT
Start: 2024-02-27

## 2024-04-24 DIAGNOSIS — J30.2 SEASONAL ALLERGIES: ICD-10-CM

## 2024-04-24 RX ORDER — AZELASTINE 1 MG/ML
SPRAY, METERED NASAL
Qty: 60 ML | Refills: 3 | Status: SHIPPED | OUTPATIENT
Start: 2024-04-24

## 2024-04-24 RX ORDER — FLUTICASONE PROPIONATE 50 MCG
1 SPRAY, SUSPENSION (ML) NASAL
Qty: 32 G | Refills: 3 | Status: SHIPPED | OUTPATIENT
Start: 2024-04-24

## 2024-04-25 ENCOUNTER — OFFICE VISIT (OUTPATIENT)
Dept: PRIMARY CARE CLINIC | Facility: CLINIC | Age: 59
End: 2024-04-25
Payer: MEDICARE

## 2024-04-25 VITALS
RESPIRATION RATE: 16 BRPM | HEART RATE: 93 BPM | SYSTOLIC BLOOD PRESSURE: 125 MMHG | WEIGHT: 253 LBS | HEIGHT: 65 IN | BODY MASS INDEX: 42.15 KG/M2 | DIASTOLIC BLOOD PRESSURE: 74 MMHG | OXYGEN SATURATION: 95 %

## 2024-04-25 DIAGNOSIS — Z79.4 TYPE 2 DIABETES MELLITUS WITH DIABETIC POLYNEUROPATHY, WITH LONG-TERM CURRENT USE OF INSULIN: ICD-10-CM

## 2024-04-25 DIAGNOSIS — I10 BENIGN ESSENTIAL HTN: Primary | ICD-10-CM

## 2024-04-25 DIAGNOSIS — J43.8 OTHER EMPHYSEMA: ICD-10-CM

## 2024-04-25 DIAGNOSIS — Z12.31 BREAST CANCER SCREENING BY MAMMOGRAM: ICD-10-CM

## 2024-04-25 DIAGNOSIS — E66.01 MORBID OBESITY WITH BMI OF 40.0-44.9, ADULT: ICD-10-CM

## 2024-04-25 DIAGNOSIS — K21.00 GASTROESOPHAGEAL REFLUX DISEASE WITH ESOPHAGITIS WITHOUT HEMORRHAGE: ICD-10-CM

## 2024-04-25 DIAGNOSIS — E03.8 OTHER SPECIFIED HYPOTHYROIDISM: ICD-10-CM

## 2024-04-25 DIAGNOSIS — E11.42 TYPE 2 DIABETES MELLITUS WITH DIABETIC POLYNEUROPATHY, WITH LONG-TERM CURRENT USE OF INSULIN: ICD-10-CM

## 2024-04-25 DIAGNOSIS — E78.2 MIXED HYPERLIPIDEMIA: ICD-10-CM

## 2024-04-25 PROBLEM — I73.9 PERIPHERAL VASCULAR DISEASE, UNSPECIFIED: Status: ACTIVE | Noted: 2024-04-25

## 2024-04-25 PROBLEM — L97.512 NON-PRESSURE CHRONIC ULCER OF OTHER PART OF RIGHT FOOT WITH FAT LAYER EXPOSED: Status: RESOLVED | Noted: 2024-04-25 | Resolved: 2024-04-25

## 2024-04-25 PROBLEM — L97.512 NON-PRESSURE CHRONIC ULCER OF OTHER PART OF RIGHT FOOT WITH FAT LAYER EXPOSED: Status: ACTIVE | Noted: 2024-04-25

## 2024-04-25 PROCEDURE — 99214 OFFICE O/P EST MOD 30 MIN: CPT | Mod: S$GLB,,, | Performed by: NURSE PRACTITIONER

## 2024-04-25 PROCEDURE — 3074F SYST BP LT 130 MM HG: CPT | Mod: CPTII,S$GLB,, | Performed by: NURSE PRACTITIONER

## 2024-04-25 PROCEDURE — 1160F RVW MEDS BY RX/DR IN RCRD: CPT | Mod: CPTII,S$GLB,, | Performed by: NURSE PRACTITIONER

## 2024-04-25 PROCEDURE — 3078F DIAST BP <80 MM HG: CPT | Mod: CPTII,S$GLB,, | Performed by: NURSE PRACTITIONER

## 2024-04-25 PROCEDURE — 4010F ACE/ARB THERAPY RXD/TAKEN: CPT | Mod: CPTII,S$GLB,, | Performed by: NURSE PRACTITIONER

## 2024-04-25 PROCEDURE — 1159F MED LIST DOCD IN RCRD: CPT | Mod: CPTII,S$GLB,, | Performed by: NURSE PRACTITIONER

## 2024-04-25 PROCEDURE — 3008F BODY MASS INDEX DOCD: CPT | Mod: CPTII,S$GLB,, | Performed by: NURSE PRACTITIONER

## 2024-04-25 PROCEDURE — 3044F HG A1C LEVEL LT 7.0%: CPT | Mod: CPTII,S$GLB,, | Performed by: NURSE PRACTITIONER

## 2024-04-25 NOTE — PROGRESS NOTES
Subjective:       Patient ID: Anca Grace is a 58 y.o. female.    Chief Complaint: Follow-up    HPI: Anca is a 58 y.o. female who presents for F/U.    Ms. Grace is now seeing Dr. Sellers for chronic bilateral knee arthritis and she plans to have knee surgery soon but she has been referred to Dr. Luna for cardiac evaluation prior to her having surgery and to check her cirulation due to her having issues with leg pain and ulcers in the past. She has had several tests done by Dr. Luna and she will be having an arteriogram on 05/02/024 then will meet with him to discuss results on 05/09/24 with Dr. Luna.     On last labs, cholesterol controlled better, LDL 59. Also, her A1C is down to 6.8 now. All of her other labs are within normal range.    DM II -  Chronic and uncontrolled on meds, b/p averaging < 130/80, denies s/e or a/r. Followed by [facility] for Endocrinology for her DM Type 2 and Hypothyroidism. Still takes Toujeo 120 units daily, Mounjaro 15 mg weekly and takes Xigduo daily. Has Neuropathy and she takes Lyrica for it which is prescribed by Orthopedic. [facility] and all was well. She also has diabetic foot exams annually by Dr. Infante in [place].    Hyperlipidemia - controlled with a STATIN. Denies se/ar. Say she has been trying to eat healthier.    Hypothyroidism - takes levothyroxine 75 mcg daily. Thyroid function was normal on last labs. Denies se/ar to medication.    Ms. Grace suffers with pain to multiple joints and her back and neck which is chronic. Follows up monthly for muscle relaxer injections. Elavil increased to 50 mg daily, flexeril, cymbalta and still on Lyrica as well. Last seen on 05/24/23 for left shoulder pain and neck pain and she was treated with tylenol #3 and flexeril but they only help some. Also followed by Dr. Gallegos for lower back pain and he does the back injections.    Now seeing Dr. Sellers for chronic bilateral  knee arthritis which she had a MRI of her left knee so she will have a F/U appt to discuss possible left knee surgery in the future.    DM II -  Chronic and uncontrolled on meds, b/p averaging < 130/80, denies s/e or a/r. Followed by [facility] for Endocrinology for her DM Type 2 and Hypothyroidism. Still takes Toujeo 120 units daily, Mounjaro 15 mg weekly and takes Xigduo daily. Has Neuropathy and she takes Lyrica for it which is prescribed by Orthopedic. Had a [facility] and all was well. She also has diabetic foot exams annually by Dr. Infante in [place].    Hyperlipidemia - controlled with a STATIN. Denies se/ar. Say she has been trying to eat healthier.    Hypothyroidism - takes levothyroxine 75 mcg daily. Thyroid function was normal on last labs. Denies se/ar to medication.    Current smoker, she has cut down to smoking 1-2 cigarettes daily after smoking 1 ppd and has smoked for 15 years. She is still doing smoking cessation which she tried the patches but is now doing it on her own.    Mammogram UTD. Hysterectomy done in 2012. Takes estradiol daily.    Had a Colonoscopy done at [facility] by Dr. Valencia in 2018 with 3 polyps found and removed, told to repeat in 3 years so she had it done in June 2023 with one polyp found but due to her not being fully cleaned out, he will repeat in 1 year.       UTD with COVID and FLU vaccines.                Past Medical History:   Diagnosis Date    Diabetes mellitus, type 2     GERD (gastroesophageal reflux disease)     Hyperlipidemia     Hypertension        Past Surgical History:   Procedure Laterality Date    CARPAL TUNNEL RELEASE      HYSTERECTOMY         Family History   Problem Relation Name Age of Onset    Thyroiditis Mother      Heart disease Father      Graves' disease Sister      Heart disease Maternal Grandfather      Cancer Paternal Grandfather         Social History     Tobacco Use    Smoking status: Every Day     Types: Cigarettes     Smokeless tobacco: Never   Substance Use Topics    Alcohol use: Never    Drug use: Never       Patient Active Problem List   Diagnosis    Degenerative disc disease, cervical    Degenerative disc disease, lumbar    Neck pain    Bilateral arm pain    Low back pain radiating to right leg    Morbid obesity with BMI of 40.0-44.9, adult    Hyperlipidemia    Benign essential HTN    Type 2 diabetes mellitus with neurologic complication, with long-term current use of insulin    Gastroesophageal reflux disease with esophagitis without hemorrhage    Other emphysema       Immunization History   Administered Date(s) Administered    COVID-19, MRNA, LN-S, PF (MODERNA FULL 0.5 ML DOSE) 10/30/2021, 11/27/2021    DTaP 1966, 01/18/1967, 03/08/1967, 04/10/1968, 03/03/1971, 08/01/1977    IPV 1966, 01/18/1967, 03/08/1967, 04/10/1968, 04/07/1978    Influenza - Quadrivalent - PF *Preferred* (6 months and older) 10/14/2021    Measles 11/07/1967    Mumps 11/13/1968    Rubella 10/21/1970    Tdap 04/13/1988, 08/06/2004           Review of Systems   Constitutional:  Negative for activity change, appetite change, chills, diaphoresis, fatigue and fever.   HENT:  Negative for tinnitus and trouble swallowing.    Eyes:  Negative for visual disturbance.   Respiratory:  Negative for chest tightness, shortness of breath and wheezing.    Cardiovascular:  Negative for palpitations and leg swelling.   Gastrointestinal:  Negative for abdominal distention, blood in stool, constipation, diarrhea and vomiting.   Endocrine: Negative for cold intolerance, heat intolerance, polydipsia, polyphagia and polyuria.   Genitourinary:  Negative for decreased urine volume, dysuria, frequency, hematuria and urgency.   Musculoskeletal:  Positive for arthralgias and joint swelling (knees). Negative for back pain and gait problem.   Skin:  Positive for color change (redness to shins). Negative for rash.   Neurological:  Negative for dizziness, syncope and  "light-headedness.   Psychiatric/Behavioral:  Negative for behavioral problems, confusion and dysphoric mood. The patient is not nervous/anxious.      Objective:     Vitals:    04/25/24 1055   BP: 125/74   BP Location: Right arm   Patient Position: Sitting   BP Method: Medium (Automatic)   Pulse: 93   Resp: 16   SpO2: 95%   Weight: 114.8 kg (253 lb)   Height: 5' 5" (1.651 m)       Physical Exam  Vitals and nursing note reviewed.   Constitutional:       General: She is not in acute distress.     Appearance: Normal appearance. She is not diaphoretic.   HENT:      Head: Normocephalic and atraumatic.      Mouth/Throat:      Mouth: Mucous membranes are moist.      Pharynx: Oropharynx is clear.   Eyes:      Extraocular Movements: Extraocular movements intact.      Conjunctiva/sclera: Conjunctivae normal.   Cardiovascular:      Rate and Rhythm: Normal rate and regular rhythm.      Heart sounds: Normal heart sounds.   Pulmonary:      Effort: Pulmonary effort is normal.      Breath sounds: Normal breath sounds. No stridor. No wheezing or rales.   Abdominal:      General: Bowel sounds are normal. There is no distension.      Palpations: Abdomen is soft.      Tenderness: There is no abdominal tenderness.   Musculoskeletal:         General: Tenderness (multiple joints, bilateral knees) present. Normal range of motion.      Cervical back: Normal range of motion.      Right lower leg: No edema.      Left lower leg: No edema.   Lymphadenopathy:      Cervical: No cervical adenopathy.   Skin:     General: Skin is warm and dry.      Capillary Refill: Capillary refill takes 2 to 3 seconds.      Findings: Erythema (shins) present.   Neurological:      General: No focal deficit present.      Mental Status: She is alert and oriented to person, place, and time.   Psychiatric:         Mood and Affect: Mood and affect normal.         Behavior: Behavior normal. Behavior is cooperative.         Orders Only on 01/23/2024   Component Date Value " Ref Range Status    Vit D, 25-Hydroxy 01/23/2024 94  >30 ng/mL Final   Orders Only on 01/23/2024   Component Date Value Ref Range Status    Hepatitis C Ab 01/23/2024 Nonreactive  Nonreactive Final   Orders Only on 01/23/2024   Component Date Value Ref Range Status    Sodium 01/23/2024 135  131 - 143 mmol/L Final    Potassium 01/23/2024 4.6  3.5 - 5.1 mmol/L Final    Chloride 01/23/2024 99  98 - 107 mmol/L Final    CO2 01/23/2024 30  21 - 32 mmol/L Final    Anion Gap 01/23/2024 6.0  3.0 - 11.0 mmol/L Final    BUN 01/23/2024 25.0 (H)  7.0 - 18.0 mg/dL Final    Creatinine 01/23/2024 1.16 (H)  0.55 - 1.02 mg/dL Final    GFR ESTIMATION 01/23/2024 48 (L)  >60 Final    BUN/Creatinine Ratio 01/23/2024 21.55  Ratio Final    Glucose 01/23/2024 211 (H)  74 - 106 mg/dL Final    Calcium 01/23/2024 9.4  8.5 - 10.1 mg/dL Final    Total Bilirubin 01/23/2024 0.3  0.2 - 1.0 mg/dL Final    AST 01/23/2024 21  15 - 37 U/L Final    ALT 01/23/2024 37  13 - 56 U/L Final    Total Protein 01/23/2024 7.1  6.4 - 8.2 g/dL Final    Albumin 01/23/2024 3.7  3.4 - 5.0 g/dL Final    Alkaline Phosphatase 01/23/2024 132 (H)  45 - 117 U/L Final    Cholesterol 01/23/2024 170  <200 mg/dL Final    Triglycerides 01/23/2024 371 (H)  0 - 149 mg/dL Final    HDL 01/23/2024 41  >39 mg/dL Final    LDL Cholesterol 01/23/2024 54.8  mg/dL Final    VLDL 01/23/2024 74  mg/dL Final    Free T4 01/23/2024 1.20  0.76 - 1.46 ng/dL Final    TSH 01/23/2024 1.750  0.358 - 3.74 uIU/mL Final   Orders Only on 01/23/2024   Component Date Value Ref Range Status    Estimated Avg Glucose 01/23/2024 165  mg/dL Final    Hemoglobin A1C 01/23/2024 6.8 (H)  4.2 - 6.3 % Final   Orders Only on 01/23/2024   Component Date Value Ref Range Status    HIV-1/HIV-2 Ab 01/23/2024 Negative  Negative Final   Orders Only on 01/23/2024   Component Date Value Ref Range Status    WBC 01/23/2024 11.2 (H)  4.5 - 10.0 10*3/uL Final    RBC 01/23/2024 4.53  4.20 - 5.40 10*6/uL Final    Hemoglobin 01/23/2024  12.7  12.0 - 16.0 g/dL Final    Hematocrit 01/23/2024 38.8  37.0 - 47.0 % Final    MCV 01/23/2024 85.7  80.0 - 99.0 fL Final    MCH 01/23/2024 28.0  27.0 - 32.0 pg Final    MCHC 01/23/2024 32.7  32.0 - 36.0 % Final    RDW RBC Auto-Rto 01/23/2024 16.4 (H)  0.0 - 15.5 % Final    Platelets 01/23/2024 258  130 - 400 10*3/uL Final    MPV 01/23/2024 12.7 (H)  9.2 - 12.2 fL Final    Neutrophils 01/23/2024 63.1  50 - 80 % Final    Immature Granulocytes 01/23/2024 1.00 (H)  0.0 - 0.43 % Final    Lymphocytes 01/23/2024 21.0  20.0 - 45.0 % Final    Monocytes 01/23/2024 9.8  2 - 10 % Final    Eosinophils 01/23/2024 4.3  0 - 6 % Final    Basophils 01/23/2024 0.8  0 - 3 % Final    nRBC# 01/23/2024 0.0  0 - 0.2 % Final    Neutrophils Absolute 01/23/2024 7.1 (H)  1.4 - 7.0 10*3/uL Final    Immature Granulocytes Absolute 01/23/2024 0.1100 (H)  0.0 - 0.0310 thou/uL Final    Lymphocytes Absolute 01/23/2024 2.4  1.2 - 4.0 10*3/uL Final    Monocytes Absolute 01/23/2024 1.1 (H)  0.1 - 0.8 10*3/uL Final    Eosinophils Absolute 01/23/2024 0.5  0.0 - 0.6 10*3/uL Final    Basophils Absolute 01/23/2024 0.1  0.0 - 0.3 10*3/uL Final    nRBC Count Absolute 01/23/2024 0.000  0 - 0.012 thou/uL Final         Assessment:      1. Benign essential HTN    2. Other emphysema    3. Type 2 diabetes mellitus with diabetic polyneuropathy, with long-term current use of insulin    4. Morbid obesity with BMI of 40.0-44.9, adult    5. Gastroesophageal reflux disease with esophagitis without hemorrhage    6. Mixed hyperlipidemia    7. Other specified hypothyroidism    8. Breast cancer screening by mammogram          Plan:     Benign essential HTN  -     Comprehensive Metabolic Panel; Future; Expected date: 04/25/2024  -     TSH w/reflex to FT4; Future; Expected date: 04/25/2024  -     CBC Auto Differential; Future; Expected date: 04/25/2024  -     Lipid Panel; Future; Expected date: 04/25/2024    Other emphysema    Type 2 diabetes mellitus with diabetic  polyneuropathy, with long-term current use of insulin  -     Hemoglobin A1C; Future; Expected date: 04/25/2024  -     Comprehensive Metabolic Panel; Future; Expected date: 04/25/2024  -     Microalbumin/Creatinine Ratio, Urine; Future; Expected date: 04/25/2024    Morbid obesity with BMI of 40.0-44.9, adult    Gastroesophageal reflux disease with esophagitis without hemorrhage    Mixed hyperlipidemia  -     Lipid Panel; Future; Expected date: 04/25/2024    Other specified hypothyroidism  -     TSH w/reflex to FT4; Future; Expected date: 04/25/2024    Breast cancer screening by mammogram  -     Mammo Digital Screening Bilat; Future; Expected date: 04/25/2024         Current Outpatient Medications   Medication Sig Dispense Refill    albuterol (PROVENTIL/VENTOLIN HFA) 90 mcg/actuation inhaler INHALE 2 PUFFS BY MOUTH EVERY EVENING AS NEEDED for SHORTNESS OF BREATH or FOR WHEEZING thank you**ynes** :-) 18 g 2    amitriptyline (ELAVIL) 50 MG tablet TAKE 1 TABLET BY MOUTH EVERY EVENING AS DIRECTED 90 tablet 1    ammonium lactate (LAC-HYDRIN) 12 % lotion APPLY TO AFFECTED AREA 2 TIMES A DAY thank youmike -)      atorvastatin (LIPITOR) 80 MG tablet Take 1 tablet (80 mg total) by mouth every evening. 90 tablet 3    azelastine (ASTELIN) 137 mcg (0.1 %) nasal spray USE 1 SPRAY NASALLY TWICE DAILY 60 mL 3    cholecalciferol, vitamin D3, 1,250 mcg (50,000 unit) capsule TAKE 1 CAPSULE BY MOUTH WEEKLY AS DIRECTED 12 capsule 1    cyclobenzaprine (FLEXERIL) 10 MG tablet TAKE 1 TABLET BY MOUTH 3 TIMES DAILY AS NEEDED FOR MUSCLE SPASM(S). MAY MAKE DROWSY 90 tablet 11    diclofenac (VOLTAREN) 75 MG EC tablet Take 75 mg by mouth 2 (two) times daily.      DULoxetine (CYMBALTA) 30 MG capsule TAKE 1 CAPSULE BY MOUTH TWICE DAILY as needed for pain 180 capsule 1    estradioL (ESTRACE) 1 MG tablet Take 1 tablet (1 mg total) by mouth once daily. 90 tablet 3    fluticasone propionate (FLONASE) 50 mcg/actuation nasal spray USE 1 SPRAY IN EACH  "NOSTRIL ONE TIME DAILY 32 g 3    folic acid (FOLVITE) 1 MG tablet TAKE 1 TABLET BY MOUTH DAILY. 90 tablet 3    furosemide (LASIX) 40 MG tablet Take 1 tablet (40 mg total) by mouth once daily. 90 tablet 1    ipratropium-albuteroL (COMBIVENT RESPIMAT)  mcg/actuation inhaler Inhale 1 puff into the lungs every 6 (six) hours as needed for Wheezing or Shortness of Breath. Rescue 12 g 3    levothyroxine (SYNTHROID) 88 MCG tablet Take 1 tablet (88 mcg total) by mouth before breakfast. 90 tablet 1    loratadine (CLARITIN) 10 mg tablet Take 10 mg by mouth.      losartan (COZAAR) 100 MG tablet Take 1 tablet (100 mg total) by mouth once daily. 90 tablet 3    meloxicam (MOBIC) 7.5 MG tablet TAKE 1 TABLET BY MOUTH 2 TIMES A DAY FOR 10 TO 14 DAYS WITH FOOD AND REST FOR 2 TO 4 DAYS AND CONTINUE IF PAIN REOCCURS 180 tablet 3    MOUNJARO 15 mg/0.5 mL PnIj Inject 15 mg into the skin once a week. 4 pen 2    potassium chloride SA (K-DUR,KLOR-CON) 20 MEQ tablet Take 1 tablet (20 mEq total) by mouth nightly. 90 tablet 1    pregabalin (LYRICA) 300 MG Cap Take 1 capsule (300 mg total) by mouth 2 (two) times daily. 180 capsule 3    TOUJEO MAX U-300 SOLOSTAR 300 unit/mL (3 mL) insulin pen INJECT 100 UNITS SUBCUTANEOUS once DAILY      TRUEPLUS PEN NEEDLE 32 gauge x 5/32" Ndle USE DAILY OR AS DIRECTED      XIGDUO XR 5-1,000 mg TAKE 2 TABLETS BY MOUTH EACH MORNING 180 tablet 3     No current facility-administered medications for this visit.       Medications Discontinued During This Encounter   Medication Reason    nicotine (NICODERM CQ) 21 mg/24 hr Patient no longer taking    acetaminophen-codeine 300-30mg (TYLENOL #3) 300-30 mg Tab Patient no longer taking       Health Maintenance   Topic Date Due    Eye Exam  Never done    TETANUS VACCINE  08/06/2014    Shingles Vaccine (1 of 2) Never done    Colorectal Cancer Screening  06/27/2024    Mammogram  07/07/2024    Hemoglobin A1c  07/23/2024    Foot Exam  09/11/2024    Lipid Panel  01/23/2025 " "   Low Dose Statin  04/25/2025    Hepatitis C Screening  Completed       Patient Instructions   RTC in 3 months for F/U or sooner if needed.    Keep appts with specialists as scheduled.    Patient Education       Diabetes and Diet   The Basics   Written by the doctors and editors at [facility]   Why is diet important in diabetes? -- Diet is important because it is part of diabetes treatment. Many people need to change what they eat and how much they eat to help treat their diabetes. It is important for people to treat their diabetes so that they:  Keep their blood sugar at or near a normal level  Prevent long-term problems, such as heart or kidney problems, that can happen in people with diabetes  Changing your diet can also help treat obesity, high blood pressure, and high cholesterol. These conditions can affect people with diabetes and can lead to future problems, such as heart attacks or strokes.  Who will work with me to change my diet? -- Your doctor or nurse will work with you to make a food plan to change your diet. They might also recommend that you work with a "dietitian." A dietitian is an expert on food and eating.  Do I need to eat at the same times every day? -- When and how often you should eat depends, in part, on the diabetes medicines you take. For example:  People who take about the same amount of insulin at the same time each day (called a "fixed regimen") should eat meals at the same times. This is also true for people who take pills that increase insulin levels, such as sulfonylureas. Eating meals at the same time every day helps prevent low blood sugar.  People who adjust the dose and timing of their insulin each day (called a "flexible regimen") do not always have to eat meals at the same time. That's because they can time their insulin dose for before they plan to eat, and also adjust the dose for how much they plan to eat.  People who take medicines that don't usually cause low blood sugar, " "such as metformin, don't have to eat meals at the same time every day.  What do I need to think about when planning what to eat? -- Our bodies break down the food we eat into small pieces called carbohydrates, proteins, and fats.  When planning what to eat, people with diabetes need to think about:  Carbohydrates (or "carbs") - Carbohydrates, which are sugars that our bodies use for energy, can raise a person's blood sugar level. Your doctor, nurse, or dietitian will tell you how many carbohydrates you should eat at each meal or snack. Foods that have carbohydrates include:  Bread, pasta, and rice  Vegetables and fruits  Dairy foods  Foods and drinks with added sugar  It is best to get your carbohydrates from fruits, vegetables, whole grains, and low-fat milk. It is best to avoid drinks with added sugar, like soda, juices, and sports drinks.   Protein - Your doctor, nurse, or dietitian will tell you how much protein you should eat each day. It is best to eat lean meats, fish, eggs, beans, peas, soy products, nuts, and seeds.  Fats - The type of fat you eat is more important than the amount of fat. "Saturated" and "trans" fats can increase your risk for heart problems, like a heart attack.  Foods that have saturated fats include meat, butter, cheese, and ice cream.  Foods that have trans fats include processed food with "partially hydrogenated oils" on the ingredient list. This may include fried foods, store bought cookies, muffins, pies, and cakes.  "Monounsaturated" and "polyunsaturated" fats are better for you. Foods with these types of fat include fish, avocado, olive oil, and nuts.  Calories - People need to eat a certain amount of calories each day to keep their weight the same. People who are overweight and want to lose weight need to eat fewer calories each day.  Fiber - Eating foods with a lot of fiber can help control a person's blood sugar level. Foods that have a lot of fiber include apples, green beans, " peas, beans, lentils, nuts, oatmeal, and whole grains.  Salt - People who have high blood pressure should not eat foods that contain a lot of salt (also called sodium). People with high blood pressure should also eat healthy foods, such as fruits, vegetables, and low-fat dairy foods.  Alcohol - Having more than 1 drink (for women) or 2 drinks (for men) a day can raise blood sugar levels. Also, drinks that have fruit juice or soda in them can raise blood sugar levels.  What can I do if I need to lose weight? -- If you need to lose weight, you can:  Exercise - Try to get at least 30 minutes of physical activity a day, most days of the week. Even gentle exercise, like walking, is good for your health. Some people with diabetes need to change their medicine dose before they exercise. They might also need to check their blood sugar levels before and after exercising.  Eat fewer calories - Your doctor, nurse, or dietitian can tell you how many calories you should eat each day in order to lose weight.  If you are worried about your weight, size, or shape, talk with your doctor, nurse, or dietitian. They can help you make changes to improve your health.  Can I eat the same foods as my family? -- Yes. You do not need to eat special foods if you have diabetes. You and your family can eat the same foods. Changing your diet is mostly about eating healthy foods and not eating too much.  What are the other parts of diabetes treatment? -- Besides changing your diet, the other parts of diabetes treatment are:  Exercise  Medicines  Some people with diabetes need to learn how to match their diet and exercise with their medicine dose. For example, people who use insulin might need to choose the dose of insulin they give themselves. To choose their dose, they need to think about:  What they plan to eat at the next meal  How much exercise they plan to do  What their blood sugar level is  If the diet and exercise do not match the  medicine dose, a person's blood sugar level can get too low or too high. Blood sugar levels that are too low or too high can cause problems.  All topics are updated as new evidence becomes available and our peer review process is complete.  This topic retrieved from [employer] on: Sep 21, 2021.  Topic 47511 Version 7.0  Release: 29.4.2 - C29.263  © 2021 UpToDate, Inc. and/or its affiliates. All rights reserved.  Consumer Information Use and Disclaimer   This information is not specific medical advice and does not replace information you receive from your health care provider. This is only a brief summary of general information. It does NOT include all information about conditions, illnesses, injuries, tests, procedures, treatments, therapies, discharge instructions or life-style choices that may apply to you. You must talk with your health care provider for complete information about your health and treatment options. This information should not be used to decide whether or not to accept your health care provider's advice, instructions or recommendations. Only your health care provider has the knowledge and training to provide advice that is right for you. The use of this information is governed by the [employer] End User License Agreement, available at https://www.[employer]/en/solutions/Invup/about/bhargav.The use of [employer] content is governed by the [employer] Terms of Use. ©2021 UpToDate, Inc. All rights reserved.  Copyright   © 2021 UpToDate, Inc. and/or its affiliates. All rights reserved.    Patient Education       Controlling Your Blood Pressure Through Lifestyle   The Basics   Written by the doctors and editors at [employer]   What does my lifestyle have to do with my blood pressure? -- The things you do and the foods you eat have a big effect on your blood pressure and your overall health. Following the right lifestyle can:  Lower your blood pressure or keep you from getting high blood pressure in the  "first place  Reduce your need for blood pressure medicines  Make medicines for high blood pressure work better, if you do take them  Lower the chances that you'll have a heart attack or stroke, or develop kidney disease  Which lifestyle choices will help lower my blood pressure? -- Here's what you can do:  Lose weight (if you are overweight)  Choose a diet rich in fruits, vegetables, and low-fat dairy products, and low in meats, sweets, and refined grains  Eat less salt (sodium)  Do something active for at least 30 minutes a day on most days of the week  Limit the amount of alcohol you drink  If you have high blood pressure, it's also very important to quit smoking (if you smoke). Quitting smoking might not bring your blood pressure down. But it will lower the chances that you'll have a heart attack or stroke, and it will help you feel better and live longer.  Start low and go slow -- The changes listed above might sound like a lot, but don't worry. You don't have to change everything all at once. The key to improving your lifestyle is to "start low and go slow." Choose 1 small, specific thing to change and try doing it for a while. If it works for you, keep doing it until it becomes a habit. If it doesn't, don't give up. Choose something else to change and see how that goes.  Let's say, for example, that you would like to improve your diet. If you're the type of person who eats cheeseburgers and French fries all the time, you can't switch to eating just salads from one day to the next. When people try to make changes like that, they often fail. Then they feel frustrated and tend to give up. So instead of trying to change everything about your diet in 1 day, change 1 or 2 small things about your diet and give yourself time to get used to those changes. For instance, keep the cheeseburger but give up the French fries. Or eat the same things but cut your portions in half.  As you find things that you are able to " "change and stick with, keep adding new changes. In time, you will see that you can actually change a lot. You just have to get used to the changes slowly.  Lose weight -- When people think about losing weight, they sometimes make it more complicated than it really is. To lose weight, you have to either eat less or move more. If you do both of those things, it's even better. But there is no single weight-loss diet or activity that's better than any other. When it comes to weight loss, the most effective plan is the one that you'll stick with.  Improve your diet -- There is no single diet that is right for everyone. But in general, a healthy diet can include:  Lots of fruits, vegetables, and whole grains  Some beans, peas, lentils, chickpeas, and similar foods  Some nuts, such as walnuts, almonds, and peanuts  Fat-free or low-fat milk and milk products  Some fish  To have a healthy diet, it's also important to limit or avoid sugar, sweets, meats, and refined grains. (Refined grains are found in white bread, white rice, most forms of pasta, and most packaged "snack" foods.)  Reduce salt -- Many people think that eating a low-sodium diet means avoiding the salt shaker and not adding salt when cooking. The truth is, not adding salt at the table or when you cook will only help a little. Almost all of the sodium you eat is already in the food you buy at the grocery store or at restaurants (figure 1).  The most important thing you can do to cut down on sodium is to eat less processed food. That means that you should avoid most foods that are sold in cans, boxes, jars, and bags. You should also eat in restaurants less often.  To reduce the amount of sodium you get, buy fresh or fresh-frozen fruits, vegetables, and meats. (Fresh-frozen foods have had nothing added to them before freezing.) Then you can make meals at home, from scratch, with these ingredients.  As with the other changes, don't try to cut out salt all at once. " "Instead, choose 1 or 2 foods that have a lot of sodium and try to replace them with low-sodium choices. When you get used to those low-sodium options, find another food or 2 to change. Then keep going, until all the foods you eat are sodium-free or low in sodium.  Become more active -- If you want to be more active, you don't have to go to the gym or get all sweaty. It is possible to increase your activity level while doing everyday things you enjoy. Walking, gardening, and dancing are just a few of the things that you might try. As with all the other changes, the key is not to do too much too fast. If you don't do any activity now, start by walking for just a few minutes every other day. Do that for a few weeks. If you stick with it, try doing it for longer. But if you find that you don't like walking, try a different activity.  Drink less alcohol -- If you are a woman, do not have more than 1 "standard drink" of alcohol a day. If you are a man, do not have more than 2. A "standard drink" is:  A can or bottle that has 12 ounces of beer  A glass that has 5 ounces of wine  A shot that has 1.5 ounces of whiskey  Where should I start? -- If you want to improve your lifestyle, start by making the changes that you think would be easiest for you. If you used to exercise and just got out of the habit, maybe it would be easy for you to start exercising again. Or if you actually like cooking meals from scratch, maybe the first thing you should focus on is eating home-cooked meals that are low in sodium.  Whatever you tackle first, choose specific, realistic goals, and give yourself a deadline. For example, do not decide that you are going to "exercise more." Instead, decide that you are going to walk for 10 minutes on Monday, Wednesday, and Friday, and that you are going to do this for the next 2 weeks.  When lifestyle changes are too general, people have a hard time following through.  Now go. You can do it!  All topics are " updated as new evidence becomes available and our peer review process is complete.  This topic retrieved from [employer] on: Sep 21, 2021.  Topic 61912 Version 8.0  Release: 29.4.2 - C29.263  © 2021 UpToDate, Inc. and/or its affiliates. All rights reserved.  figure 1: Sources of sodium in your diet     Graphic 51633 Version 2.0    Consumer Information Use and Disclaimer   This information is not specific medical advice and does not replace information you receive from your health care provider. This is only a brief summary of general information. It does NOT include all information about conditions, illnesses, injuries, tests, procedures, treatments, therapies, discharge instructions or life-style choices that may apply to you. You must talk with your health care provider for complete information about your health and treatment options. This information should not be used to decide whether or not to accept your health care provider's advice, instructions or recommendations. Only your health care provider has the knowledge and training to provide advice that is right for you. The use of this information is governed by the [employer] End User License Agreement, available at https://www.TM/en/solutions/[employer]/about/bhargav.The use of [employer] content is governed by the [employer] Terms of Use. ©2021 [employer]Date, Inc. All rights reserved.  Copyright   © 2021 Up[employer]Date, Inc. and/or its affiliates. All rights reserved.    Patient Education       Low Cholesterol, Saturated Fat, and Trans Fat Diet   About this topic   Cholesterol, saturated fat, and trans fat are in many foods. These may raise your blood cholesterol levels. If your cholesterol is too high, this can cause health problems in your heart, liver, kidneys, and even your eyes. The key to lowering your risk of heart problems is to lower your bad fat intake.  Saturated fats and trans fats are the bad fats. These fats clog your arteries and raise your bad  "cholesterol. Saturated fats and trans fats are solid fats at room temperature. Saturated fats are animal fats. Trans fats are manmade fats. They add flavor to a lot of packaged foods. Staying away from saturated and trans fats will help your heart.  When you do eat foods with fat, make sure they have the good fats. Monounsaturated and polyunsaturated fats are good fats. These fats help raise your good cholesterol and protect your heart.  General   How to Lower Fat and Cholesterol in Your Diet   Read the labels of the foods you buy from the market to find out how much fat is present. Under 5% of total fat on a label means it is "low fat". Over 20% of total fat on a label means it is high fat.  Eat high fiber foods, like soluble fiber. This type of fiber helps lower cholesterol in the body. Choose oatmeal, fruits (like apples), beans, and nuts to get the most soluble fiber.  Eat foods high in omega-3 fatty acids like gopal seeds, walnuts, salmon, tuna, trout, herring, flaxseed, and soybeans. These foods help keep the heart healthy.  Limit your bad fat and oil intake.  Stay away from butter, stick margarine, shortening, lard, and palm and coconut oil. Pick plant-based spreads instead.  Limit mayonnaise, salad dressings, gravies, and sauces, unless it is made from low-fat ingredients.  Limit chocolate.  Do not eat high-fat processed foods like hot dogs, feliciano, sausage, ham and other luncheon meats high in fat, and some frozen foods. Pick fish, chicken, turkey, and lean meats instead.  Eat more dried beans, lentils, and tofu to get your protein.  Do not eat organ meats, like liver.  Choose nonfat or low-fat milk, yogurt, and cheese.  Use light or fat-free cream cheese and sour cream.  Eat lots of fruits and vegetables.  Pick whole grain breads, cereals, pastas, and rice.  Do not eat snacks that are high in fats like granola, cookies, pies, pastries, doughnuts, and croissants.  Stay away from deep fried foods.  Help When " Cooking   Remove the fat portion of meats and the skin from poultry before cooking.  Bake, broil, grill, poach, or roast poultry, fish, and lean meats.  Drain and throw away the fat that drains out of meat as you cook it.  Try to add little or no fat to foods.  Use olive or canola oil for cooking or baking.  Steam your vegetables.  Use herbs or no-oil marinades to flavor foods.         Who should use this diet?   This diet is for people who are at high risk of getting health problems like heart disease, high blood pressure, diabetes, and others. This diet is also good for all people to follow to keep your heart healthy.  What foods are good to eat?   Foods with good fats are:  Canola, peanut, and olive oil  Safflower, soybean, and corn oil  Walnuts, almonds, cashews, and peanuts  Pumpkin and sunflower seeds  [place] and tuna  Tofu  Soymilk  Avocado  What foods should be limited or avoided?   Stay away from these types of foods that have saturated fats:  Whole fat dairy products like cheese, ice cream, whole milk, and cream  Palm and coconut oils  High-fat meats like beef, lamb, poultry with the skin, feliciano, and sausage  Butter and lard  Stay away from these types of foods that may have trans fat:  Cookies, cakes, candy, doughnuts, baked goods, muffins, pizza dough, and pie crusts that are packaged  Fried foods  Frozen dinners  Chips and crackers  Microwave popcorn  Stick margarine and vegetable shortenings  Helpful tips   To help stay away from saturated fat:  Pick lean cuts of meat  Take the skin off chicken and turkey or pick skinless  Pick low-fat cheese, milk, and ice cream  Use liquid oils when cooking and baking, such as olive oil and canola oil  To help stay away from trans fat:  Look at your labels. Choose foods with 0% trans fat. Read the ingredient list. Avoid foods with partially hydrogenated oil in the ingredient list. This means there is trans fat in the product.  Where can I learn more?   American Heart  Association   http://www.heart.org/HEARTORG/Conditions/Cholesterol/PreventionTreatmentofHighCholesterol/Know-Your-Fats_[email or web address]_[email or web address]_Article.[email or web address]   Last Reviewed Date   2021-10-05  Consumer Information Use and Disclaimer   This information is not specific medical advice and does not replace information you receive from your health care provider. This is only a brief summary of general information. It does NOT include all information about conditions, illnesses, injuries, tests, procedures, treatments, therapies, discharge instructions or life-style choices that may apply to you. You must talk with your health care provider for complete information about your health and treatment options. This information should not be used to decide whether or not to accept your health care providers advice, instructions or recommendations. Only your health care provider has the knowledge and training to provide advice that is right for you.   Copyright   Copyright © 2021 UpTo[employer], [employer]. and its affiliates and/or licensors. All rights reserved.          Risks, benefits, and alternatives discussed with patient, Patient verbalized understanding of discussed plan of care. Asked patient if any further questions, answered no.    Future Appointments   Date Time Provider Department Center   7/30/2024 11:00 AM Garrett Pulliam NP [facility]              Garrett Pulliam NP

## 2024-04-25 NOTE — PATIENT INSTRUCTIONS
"RTC in 3 months for F/U or sooner if needed.    Keep appts with specialists as scheduled.    Patient Education       Diabetes and Diet   The Basics   Written by the doctors and editors at [facility]   Why is diet important in diabetes? -- Diet is important because it is part of diabetes treatment. Many people need to change what they eat and how much they eat to help treat their diabetes. It is important for people to treat their diabetes so that they:  Keep their blood sugar at or near a normal level  Prevent long-term problems, such as heart or kidney problems, that can happen in people with diabetes  Changing your diet can also help treat obesity, high blood pressure, and high cholesterol. These conditions can affect people with diabetes and can lead to future problems, such as heart attacks or strokes.  Who will work with me to change my diet? -- Your doctor or nurse will work with you to make a food plan to change your diet. They might also recommend that you work with a "dietitian." A dietitian is an expert on food and eating.  Do I need to eat at the same times every day? -- When and how often you should eat depends, in part, on the diabetes medicines you take. For example:  People who take about the same amount of insulin at the same time each day (called a "fixed regimen") should eat meals at the same times. This is also true for people who take pills that increase insulin levels, such as sulfonylureas. Eating meals at the same time every day helps prevent low blood sugar.  People who adjust the dose and timing of their insulin each day (called a "flexible regimen") do not always have to eat meals at the same time. That's because they can time their insulin dose for before they plan to eat, and also adjust the dose for how much they plan to eat.  People who take medicines that don't usually cause low blood sugar, such as metformin, don't have to eat meals at the same time every day.  What do I need to think " "about when planning what to eat? -- Our bodies break down the food we eat into small pieces called carbohydrates, proteins, and fats.  When planning what to eat, people with diabetes need to think about:  Carbohydrates (or "carbs") - Carbohydrates, which are sugars that our bodies use for energy, can raise a person's blood sugar level. Your doctor, nurse, or dietitian will tell you how many carbohydrates you should eat at each meal or snack. Foods that have carbohydrates include:  Bread, pasta, and rice  Vegetables and fruits  Dairy foods  Foods and drinks with added sugar  It is best to get your carbohydrates from fruits, vegetables, whole grains, and low-fat milk. It is best to avoid drinks with added sugar, like soda, juices, and sports drinks.   Protein - Your doctor, nurse, or dietitian will tell you how much protein you should eat each day. It is best to eat lean meats, fish, eggs, beans, peas, soy products, nuts, and seeds.  Fats - The type of fat you eat is more important than the amount of fat. "Saturated" and "trans" fats can increase your risk for heart problems, like a heart attack.  Foods that have saturated fats include meat, butter, cheese, and ice cream.  Foods that have trans fats include processed food with "partially hydrogenated oils" on the ingredient list. This may include fried foods, store bought cookies, muffins, pies, and cakes.  "Monounsaturated" and "polyunsaturated" fats are better for you. Foods with these types of fat include fish, avocado, olive oil, and nuts.  Calories - People need to eat a certain amount of calories each day to keep their weight the same. People who are overweight and want to lose weight need to eat fewer calories each day.  Fiber - Eating foods with a lot of fiber can help control a person's blood sugar level. Foods that have a lot of fiber include apples, green beans, peas, beans, lentils, nuts, oatmeal, and whole grains.  Salt - People who have high blood " pressure should not eat foods that contain a lot of salt (also called sodium). People with high blood pressure should also eat healthy foods, such as fruits, vegetables, and low-fat dairy foods.  Alcohol - Having more than 1 drink (for women) or 2 drinks (for men) a day can raise blood sugar levels. Also, drinks that have fruit juice or soda in them can raise blood sugar levels.  What can I do if I need to lose weight? -- If you need to lose weight, you can:  Exercise - Try to get at least 30 minutes of physical activity a day, most days of the week. Even gentle exercise, like walking, is good for your health. Some people with diabetes need to change their medicine dose before they exercise. They might also need to check their blood sugar levels before and after exercising.  Eat fewer calories - Your doctor, nurse, or dietitian can tell you how many calories you should eat each day in order to lose weight.  If you are worried about your weight, size, or shape, talk with your doctor, nurse, or dietitian. They can help you make changes to improve your health.  Can I eat the same foods as my family? -- Yes. You do not need to eat special foods if you have diabetes. You and your family can eat the same foods. Changing your diet is mostly about eating healthy foods and not eating too much.  What are the other parts of diabetes treatment? -- Besides changing your diet, the other parts of diabetes treatment are:  Exercise  Medicines  Some people with diabetes need to learn how to match their diet and exercise with their medicine dose. For example, people who use insulin might need to choose the dose of insulin they give themselves. To choose their dose, they need to think about:  What they plan to eat at the next meal  How much exercise they plan to do  What their blood sugar level is  If the diet and exercise do not match the medicine dose, a person's blood sugar level can get too low or too high. Blood sugar levels that  are too low or too high can cause problems.  All topics are updated as new evidence becomes available and our peer review process is complete.  This topic retrieved from [employer] on: Sep 21, 2021.  Topic 73596 Version 7.0  Release: 29.4.2 - C29.263  © 2021 UpToDate, Inc. and/or its affiliates. All rights reserved.  Consumer Information Use and Disclaimer   This information is not specific medical advice and does not replace information you receive from your health care provider. This is only a brief summary of general information. It does NOT include all information about conditions, illnesses, injuries, tests, procedures, treatments, therapies, discharge instructions or life-style choices that may apply to you. You must talk with your health care provider for complete information about your health and treatment options. This information should not be used to decide whether or not to accept your health care provider's advice, instructions or recommendations. Only your health care provider has the knowledge and training to provide advice that is right for you. The use of this information is governed by the [employer] End User License Agreement, available at https://www.[employer]/en/solutions/Deezer/about/bhargav.The use of [employer] content is governed by the [employer] Terms of Use. ©2021 UpToDate, Inc. All rights reserved.  Copyright   © 2021 UpToDate, Inc. and/or its affiliates. All rights reserved.    Patient Education       Controlling Your Blood Pressure Through Lifestyle   The Basics   Written by the doctors and editors at [facility] [facility]   What does my lifestyle have to do with my blood pressure? -- The things you do and the foods you eat have a big effect on your blood pressure and your overall health. Following the right lifestyle can:  Lower your blood pressure or keep you from getting high blood pressure in the first place  Reduce your need for blood pressure medicines  Make medicines for high blood pressure  "work better, if you do take them  Lower the chances that you'll have a heart attack or stroke, or develop kidney disease  Which lifestyle choices will help lower my blood pressure? -- Here's what you can do:  Lose weight (if you are overweight)  Choose a diet rich in fruits, vegetables, and low-fat dairy products, and low in meats, sweets, and refined grains  Eat less salt (sodium)  Do something active for at least 30 minutes a day on most days of the week  Limit the amount of alcohol you drink  If you have high blood pressure, it's also very important to quit smoking (if you smoke). Quitting smoking might not bring your blood pressure down. But it will lower the chances that you'll have a heart attack or stroke, and it will help you feel better and live longer.  Start low and go slow -- The changes listed above might sound like a lot, but don't worry. You don't have to change everything all at once. The key to improving your lifestyle is to "start low and go slow." Choose 1 small, specific thing to change and try doing it for a while. If it works for you, keep doing it until it becomes a habit. If it doesn't, don't give up. Choose something else to change and see how that goes.  Let's say, for example, that you would like to improve your diet. If you're the type of person who eats cheeseburgers and French fries all the time, you can't switch to eating just salads from one day to the next. When people try to make changes like that, they often fail. Then they feel frustrated and tend to give up. So instead of trying to change everything about your diet in 1 day, change 1 or 2 small things about your diet and give yourself time to get used to those changes. For instance, keep the cheeseburger but give up the French fries. Or eat the same things but cut your portions in half.  As you find things that you are able to change and stick with, keep adding new changes. In time, you will see that you can actually change a " "lot. You just have to get used to the changes slowly.  Lose weight -- When people think about losing weight, they sometimes make it more complicated than it really is. To lose weight, you have to either eat less or move more. If you do both of those things, it's even better. But there is no single weight-loss diet or activity that's better than any other. When it comes to weight loss, the most effective plan is the one that you'll stick with.  Improve your diet -- There is no single diet that is right for everyone. But in general, a healthy diet can include:  Lots of fruits, vegetables, and whole grains  Some beans, peas, lentils, chickpeas, and similar foods  Some nuts, such as walnuts, almonds, and peanuts  Fat-free or low-fat milk and milk products  Some fish  To have a healthy diet, it's also important to limit or avoid sugar, sweets, meats, and refined grains. (Refined grains are found in white bread, white rice, most forms of pasta, and most packaged "snack" foods.)  Reduce salt -- Many people think that eating a low-sodium diet means avoiding the salt shaker and not adding salt when cooking. The truth is, not adding salt at the table or when you cook will only help a little. Almost all of the sodium you eat is already in the food you buy at the grocery store or at restaurants (figure 1).  The most important thing you can do to cut down on sodium is to eat less processed food. That means that you should avoid most foods that are sold in cans, boxes, jars, and bags. You should also eat in restaurants less often.  To reduce the amount of sodium you get, buy fresh or fresh-frozen fruits, vegetables, and meats. (Fresh-frozen foods have had nothing added to them before freezing.) Then you can make meals at home, from scratch, with these ingredients.  As with the other changes, don't try to cut out salt all at once. Instead, choose 1 or 2 foods that have a lot of sodium and try to replace them with low-sodium " "choices. When you get used to those low-sodium options, find another food or 2 to change. Then keep going, until all the foods you eat are sodium-free or low in sodium.  Become more active -- If you want to be more active, you don't have to go to the gym or get all sweaty. It is possible to increase your activity level while doing everyday things you enjoy. Walking, gardening, and dancing are just a few of the things that you might try. As with all the other changes, the key is not to do too much too fast. If you don't do any activity now, start by walking for just a few minutes every other day. Do that for a few weeks. If you stick with it, try doing it for longer. But if you find that you don't like walking, try a different activity.  Drink less alcohol -- If you are a woman, do not have more than 1 "standard drink" of alcohol a day. If you are a man, do not have more than 2. A "standard drink" is:  A can or bottle that has 12 ounces of beer  A glass that has 5 ounces of wine  A shot that has 1.5 ounces of whiskey  Where should I start? -- If you want to improve your lifestyle, start by making the changes that you think would be easiest for you. If you used to exercise and just got out of the habit, maybe it would be easy for you to start exercising again. Or if you actually like cooking meals from scratch, maybe the first thing you should focus on is eating home-cooked meals that are low in sodium.  Whatever you tackle first, choose specific, realistic goals, and give yourself a deadline. For example, do not decide that you are going to "exercise more." Instead, decide that you are going to walk for 10 minutes on Monday, Wednesday, and Friday, and that you are going to do this for the next 2 weeks.  When lifestyle changes are too general, people have a hard time following through.  Now go. You can do it!  All topics are updated as new evidence becomes available and our peer review process is complete.  This topic " retrieved from [employer] on: Sep 21, 2021.  Topic 37796 Version 8.0  Release: 29.4.2 - C29.263  © 2021 UpToDate, Inc. and/or its affiliates. All rights reserved.  figure 1: Sources of sodium in your diet     Graphic 95643 Version 2.0    Consumer Information Use and Disclaimer   This information is not specific medical advice and does not replace information you receive from your health care provider. This is only a brief summary of general information. It does NOT include all information about conditions, illnesses, injuries, tests, procedures, treatments, therapies, discharge instructions or life-style choices that may apply to you. You must talk with your health care provider for complete information about your health and treatment options. This information should not be used to decide whether or not to accept your health care provider's advice, instructions or recommendations. Only your health care provider has the knowledge and training to provide advice that is right for you. The use of this information is governed by the [employer] End User License Agreement, available at https://www.[employer]/en/solutions/Clearhaus/about/bhargav.The use of [employer] content is governed by the [employer] Terms of Use. ©2021 UpToDate, Inc. All rights reserved.  Copyright   © 2021 UpToDate, Inc. and/or its affiliates. All rights reserved.    Patient Education       Low Cholesterol, Saturated Fat, and Trans Fat Diet   About this topic   Cholesterol, saturated fat, and trans fat are in many foods. These may raise your blood cholesterol levels. If your cholesterol is too high, this can cause health problems in your heart, liver, kidneys, and even your eyes. The key to lowering your risk of heart problems is to lower your bad fat intake.  Saturated fats and trans fats are the bad fats. These fats clog your arteries and raise your bad cholesterol. Saturated fats and trans fats are solid fats at room temperature. Saturated fats are  "animal fats. Trans fats are manmade fats. They add flavor to a lot of packaged foods. Staying away from saturated and trans fats will help your heart.  When you do eat foods with fat, make sure they have the good fats. Monounsaturated and polyunsaturated fats are good fats. These fats help raise your good cholesterol and protect your heart.  General   How to Lower Fat and Cholesterol in Your Diet   Read the labels of the foods you buy from the market to find out how much fat is present. Under 5% of total fat on a label means it is "low fat". Over 20% of total fat on a label means it is high fat.  Eat high fiber foods, like soluble fiber. This type of fiber helps lower cholesterol in the body. Choose oatmeal, fruits (like apples), beans, and nuts to get the most soluble fiber.  Eat foods high in omega-3 fatty acids like gopal seeds, walnuts, salmon, tuna, trout, herring, flaxseed, and soybeans. These foods help keep the heart healthy.  Limit your bad fat and oil intake.  Stay away from butter, stick margarine, shortening, lard, and palm and coconut oil. Pick plant-based spreads instead.  Limit mayonnaise, salad dressings, gravies, and sauces, unless it is made from low-fat ingredients.  Limit chocolate.  Do not eat high-fat processed foods like hot dogs, feliciano, sausage, ham and other luncheon meats high in fat, and some frozen foods. Pick fish, chicken, turkey, and lean meats instead.  Eat more dried beans, lentils, and tofu to get your protein.  Do not eat organ meats, like liver.  Choose nonfat or low-fat milk, yogurt, and cheese.  Use light or fat-free cream cheese and sour cream.  Eat lots of fruits and vegetables.  Pick whole grain breads, cereals, pastas, and rice.  Do not eat snacks that are high in fats like granola, cookies, pies, pastries, doughnuts, and croissants.  Stay away from deep fried foods.  Help When Cooking   Remove the fat portion of meats and the skin from poultry before cooking.  adilson Viera, " grill, poach, or roast poultry, fish, and lean meats.  Drain and throw away the fat that drains out of meat as you cook it.  Try to add little or no fat to foods.  Use olive or canola oil for cooking or baking.  Steam your vegetables.  Use herbs or no-oil marinades to flavor foods.         Who should use this diet?   This diet is for people who are at high risk of getting health problems like heart disease, high blood pressure, diabetes, and others. This diet is also good for all people to follow to keep your heart healthy.  What foods are good to eat?   Foods with good fats are:  Canola, peanut, and olive oil  Safflower, soybean, and corn oil  Walnuts, almonds, cashews, and peanuts  Pumpkin and sunflower seeds  Bracey and tuna  Tofu  Soymilk  Avocado  What foods should be limited or avoided?   Stay away from these types of foods that have saturated fats:  Whole fat dairy products like cheese, ice cream, whole milk, and cream  Palm and coconut oils  High-fat meats like beef, lamb, poultry with the skin, feliciano, and sausage  Butter and lard  Stay away from these types of foods that may have trans fat:  Cookies, cakes, candy, doughnuts, baked goods, muffins, pizza dough, and pie crusts that are packaged  Fried foods  Frozen dinners  Chips and crackers  Microwave popcorn  Stick margarine and vegetable shortenings  Helpful tips   To help stay away from saturated fat:  Pick lean cuts of meat  Take the skin off chicken and turkey or pick skinless  Pick low-fat cheese, milk, and ice cream  Use liquid oils when cooking and baking, such as olive oil and canola oil  To help stay away from trans fat:  Look at your labels. Choose foods with 0% trans fat. Read the ingredient list. Avoid foods with partially hydrogenated oil in the ingredient list. This means there is trans fat in the product.  Where can I learn more?   American Heart Association    [email or web address]   Last Reviewed Date   2021-10-05  Consumer Information Use and Disclaimer   This information is not specific medical advice and does not replace information you receive from your health care provider. This is only a brief summary of general information. It does NOT include all information about conditions, illnesses, injuries, tests, procedures, treatments, therapies, discharge instructions or life-style choices that may apply to you. You must talk with your health care provider for complete information about your health and treatment options. This information should not be used to decide whether or not to accept your health care providers advice, instructions or recommendations. Only your health care provider has the knowledge and training to provide advice that is right for you.   Copyright   Copyright © 2021 UpToDate, Inc. and its affiliates and/or licensors. All rights reserved.

## 2024-05-01 ENCOUNTER — TELEPHONE (OUTPATIENT)
Dept: SMOKING CESSATION | Facility: CLINIC | Age: 59
End: 2024-05-01
Payer: MEDICARE

## 2024-05-01 NOTE — TELEPHONE ENCOUNTER
1st attempt left message regarding smoking cessation quit 3 episode.  Voice message includes name and contact information.

## 2024-07-01 DIAGNOSIS — E87.6 LOW BLOOD POTASSIUM: ICD-10-CM

## 2024-07-01 RX ORDER — POTASSIUM CHLORIDE 20 MEQ/1
20 TABLET, EXTENDED RELEASE ORAL NIGHTLY
Qty: 90 TABLET | Refills: 3 | Status: SHIPPED | OUTPATIENT
Start: 2024-07-01

## 2024-07-05 DIAGNOSIS — G47.09 OTHER INSOMNIA: ICD-10-CM

## 2024-07-05 DIAGNOSIS — M51.36 DEGENERATIVE DISC DISEASE, LUMBAR: ICD-10-CM

## 2024-07-05 DIAGNOSIS — M50.30 DEGENERATIVE DISC DISEASE, CERVICAL: ICD-10-CM

## 2024-07-05 DIAGNOSIS — G89.29 OTHER CHRONIC BACK PAIN: ICD-10-CM

## 2024-07-05 DIAGNOSIS — M54.9 OTHER CHRONIC BACK PAIN: ICD-10-CM

## 2024-07-08 RX ORDER — DULOXETIN HYDROCHLORIDE 30 MG/1
CAPSULE, DELAYED RELEASE ORAL
Qty: 180 CAPSULE | Refills: 3 | Status: SHIPPED | OUTPATIENT
Start: 2024-07-08

## 2024-07-08 RX ORDER — AMITRIPTYLINE HYDROCHLORIDE 50 MG/1
TABLET, FILM COATED ORAL
Qty: 90 TABLET | Refills: 3 | Status: SHIPPED | OUTPATIENT
Start: 2024-07-08

## 2024-07-24 DIAGNOSIS — E55.9 VITAMIN D DEFICIENCY: ICD-10-CM

## 2024-07-25 RX ORDER — ASPIRIN 325 MG
TABLET, DELAYED RELEASE (ENTERIC COATED) ORAL
Qty: 12 CAPSULE | Refills: 3 | Status: SHIPPED | OUTPATIENT
Start: 2024-07-25

## 2024-07-26 ENCOUNTER — CLINICAL SUPPORT (OUTPATIENT)
Dept: OBSTETRICS AND GYNECOLOGY | Facility: CLINIC | Age: 59
End: 2024-07-26
Payer: MEDICARE

## 2024-07-26 DIAGNOSIS — Z01.89 ROUTINE LAB DRAW: Primary | ICD-10-CM

## 2024-07-26 LAB
ABS NRBC COUNT: 0 THOU/UL (ref 0–0.01)
ABSOLUTE BASOPHIL: 0.1 10*3/UL (ref 0–0.3)
ABSOLUTE EOSINOPHIL: 0.8 10*3/UL (ref 0–0.6)
ABSOLUTE IMMATURE GRAN: 0.1 THOU/UL (ref 0–0.03)
ABSOLUTE LYMPHOCYTE: 2.4 10*3/UL (ref 1.2–4)
ABSOLUTE MONOCYTE: 1 10*3/UL (ref 0.1–0.8)
ALBUMIN SERPL BCP-MCNC: 3.6 G/DL (ref 3.4–5)
ALP SERPL-CCNC: 132 U/L (ref 45–117)
ALT SERPL W P-5'-P-CCNC: 28 U/L (ref 13–56)
ANION GAP SERPL CALC-SCNC: 7 MMOL/L (ref 3–11)
AST SERPL-CCNC: 21 U/L (ref 15–37)
BASOPHILS NFR BLD: 1 % (ref 0–3)
BILIRUB SERPL-MCNC: 0.3 MG/DL (ref 0.2–1)
BUN SERPL-MCNC: 18 MG/DL (ref 7–18)
BUN/CREAT SERPL: 13.43 RATIO
CALCIUM SERPL-MCNC: 9.8 MG/DL (ref 8.5–10.1)
CHLORIDE SERPL-SCNC: 99 MMOL/L (ref 98–107)
CHOLEST SERPL-MSCNC: 226 MG/DL
CO2 SERPL-SCNC: 29 MMOL/L (ref 21–32)
CREAT SERPL-MCNC: 1.34 MG/DL (ref 0.55–1.02)
CREATININE, URINE: 6.1 MG/DL (ref 10–175)
EOSINOPHIL NFR BLD: 8 % (ref 0–6)
ERYTHROCYTE [DISTWIDTH] IN BLOOD BY AUTOMATED COUNT: 17.2 % (ref 0–15.5)
ESTIMATED AVG GLUCOSE: 175 MG/DL
GFR ESTIMATION: 41
GLUCOSE SERPL-MCNC: 181 MG/DL (ref 74–106)
HBA1C MFR BLD: 7.1 % (ref 4.2–6.3)
HCT VFR BLD AUTO: 41.1 % (ref 37–47)
HDLC SERPL-MCNC: 53 MG/DL
HGB BLD-MCNC: 12.9 G/DL (ref 12–16)
IMMATURE GRANULOCYTES: 1 % (ref 0–0.43)
LDLC SERPL CALC-MCNC: 118.2 MG/DL
LYMPHOCYTES NFR BLD: 23.4 % (ref 20–45)
MCH RBC QN AUTO: 27.7 PG (ref 27–32)
MCHC RBC AUTO-ENTMCNC: 31.4 % (ref 32–36)
MCV RBC AUTO: 88.2 FL (ref 80–99)
MICROALBUMIN URINE: 3.9 MG/L (ref 0–20)
MICROALBUMIN/CREATININE RATIO: 63 MG/G (ref 0–30)
MONOCYTES NFR BLD: 9.9 % (ref 2–10)
NEUTROPHILS # BLD AUTO: 5.8 10*3/UL (ref 1.4–7)
NEUTROPHILS NFR BLD: 56.7 % (ref 50–80)
NUCLEATED RED BLOOD CELLS: 0 % (ref 0–0.2)
PLATELETS: 229 10*3/UL (ref 130–400)
PMV BLD AUTO: 11.2 FL (ref 9.2–12.2)
POTASSIUM SERPL-SCNC: 4.8 MMOL/L (ref 3.5–5.1)
PROT SERPL-MCNC: 7.5 G/DL (ref 6.4–8.2)
RBC # BLD AUTO: 4.66 10*6/UL (ref 4.2–5.4)
SODIUM BLD-SCNC: 135 MMOL/L (ref 131–143)
T4 FREE SP9 P CHAL SERPL-SCNC: 1.11 NG/DL (ref 0.76–1.46)
TRIGL SERPL-MCNC: 274 MG/DL (ref 0–149)
TSH SERPL DL<=0.005 MIU/L-ACNC: 2.07 UIU/ML (ref 0.36–3.74)
VLDL CHOLESTEROL: 55 MG/DL
WBC # BLD: 10.2 10*3/UL (ref 4.5–10)

## 2024-07-30 ENCOUNTER — OFFICE VISIT (OUTPATIENT)
Dept: PRIMARY CARE CLINIC | Facility: CLINIC | Age: 59
End: 2024-07-30
Payer: MEDICARE

## 2024-07-30 VITALS
HEART RATE: 82 BPM | BODY MASS INDEX: 42.52 KG/M2 | WEIGHT: 255.19 LBS | TEMPERATURE: 98 F | HEIGHT: 65 IN | RESPIRATION RATE: 18 BRPM | DIASTOLIC BLOOD PRESSURE: 76 MMHG | OXYGEN SATURATION: 95 % | SYSTOLIC BLOOD PRESSURE: 117 MMHG

## 2024-07-30 DIAGNOSIS — E11.42 TYPE 2 DIABETES MELLITUS WITH DIABETIC POLYNEUROPATHY, WITH LONG-TERM CURRENT USE OF INSULIN: ICD-10-CM

## 2024-07-30 DIAGNOSIS — I10 BENIGN ESSENTIAL HTN: Primary | ICD-10-CM

## 2024-07-30 DIAGNOSIS — E03.8 OTHER SPECIFIED HYPOTHYROIDISM: ICD-10-CM

## 2024-07-30 DIAGNOSIS — J43.8 OTHER EMPHYSEMA: ICD-10-CM

## 2024-07-30 DIAGNOSIS — R06.2 WHEEZING: ICD-10-CM

## 2024-07-30 DIAGNOSIS — R35.0 URINE FREQUENCY: ICD-10-CM

## 2024-07-30 DIAGNOSIS — R18.8 OTHER ASCITES: ICD-10-CM

## 2024-07-30 DIAGNOSIS — Z79.4 TYPE 2 DIABETES MELLITUS WITH DIABETIC POLYNEUROPATHY, WITH LONG-TERM CURRENT USE OF INSULIN: ICD-10-CM

## 2024-07-30 DIAGNOSIS — K21.00 GASTROESOPHAGEAL REFLUX DISEASE WITH ESOPHAGITIS WITHOUT HEMORRHAGE: ICD-10-CM

## 2024-07-30 DIAGNOSIS — R06.02 SOB (SHORTNESS OF BREATH): ICD-10-CM

## 2024-07-30 DIAGNOSIS — E78.2 MIXED HYPERLIPIDEMIA: ICD-10-CM

## 2024-07-30 DIAGNOSIS — E66.01 MORBID OBESITY WITH BMI OF 40.0-44.9, ADULT: ICD-10-CM

## 2024-07-30 RX ORDER — LEVOTHYROXINE SODIUM 88 UG/1
88 TABLET ORAL
Qty: 90 TABLET | Refills: 1 | Status: SHIPPED | OUTPATIENT
Start: 2024-07-30

## 2024-07-30 RX ORDER — FUROSEMIDE 40 MG/1
40 TABLET ORAL DAILY
Qty: 90 TABLET | Refills: 1 | Status: SHIPPED | OUTPATIENT
Start: 2024-07-30

## 2024-07-30 RX ORDER — ALBUTEROL SULFATE 90 UG/1
INHALANT RESPIRATORY (INHALATION)
Qty: 18 G | Refills: 2 | Status: SHIPPED | OUTPATIENT
Start: 2024-07-30

## 2024-07-30 NOTE — PROGRESS NOTES
Subjective:       Patient ID: Anca Grace is a 58 y.o. female.    Chief Complaint: Follow-up    HPI: Anca is a 58 y.o. female who presents for F/U.    Labs recently drawn, A1C increased to 7.1, kidney function decreased to 47 and her cholesterol is elevated, LDL is 118. Ms. Grace admits she has not been eating healthy and has eaten more carbs and sweets.    Ms. Grace is now seeing Dr. Sellers for chronic bilateral knee arthritis and she plans to have knee surgery soon but she has been referred to Dr. Luna for cardiac evaluation prior to her having surgery and to check her cirulation due to her having issues with leg pain and ulcers in the past. She has had several tests done by Dr. Luna and she will be having an arteriogram on 05/02/024 then will meet with him to discuss results on 05/09/24 with Dr. Luna.     DM II -  Chronic and uncontrolled on meds, b/p averaging < 130/80, denies s/e or a/r. Followed by [facility] for Endocrinology for her DM Type 2 and Hypothyroidism. Still takes Toujeo 120 units daily, Mounjaro 15 mg weekly and takes Xigduo daily. Has Neuropathy and she takes Lyrica for it which is prescribed by Orthopedic. [facility] and all was well. She also has diabetic foot exams annually by Dr. Infante in [place].    Hyperlipidemia - controlled with a STATIN. Denies se/ar. Say she has been trying to eat healthier.    Hypothyroidism - takes levothyroxine 75 mcg daily. Thyroid function was normal on last labs. Denies se/ar to medication.    Ms. Grace suffers with pain to multiple joints and her back and neck which is chronic. Follows up monthly for muscle relaxer injections. Elavil increased to 50 mg daily, flexeril, cymbalta and still on Lyrica as well. Last seen on 05/24/23 for left shoulder pain and neck pain and she was treated with tylenol #3 and flexeril but they only help some. Also followed by Dr. Gallegos for lower back pain and  he does the back injections.    Now seeing Dr. Sellers for chronic bilateral knee arthritis which she had a MRI of her left knee so she will have a F/U appt to discuss possible left knee surgery in the future.    DM II -  Chronic and uncontrolled on meds, b/p averaging < 130/80, denies s/e or a/r. Followed by [facility] for Endocrinology for her DM Type 2 and Hypothyroidism. Still takes Toujeo 120 units daily, Mounjaro 15 mg weekly and takes Xigduo daily. Has Neuropathy and she takes Lyrica for it which is prescribed by Orthopedic. Had a [facility] exam in [facility] and all was well. She also has diabetic foot exams annually by Dr. Infante in [place].    Hyperlipidemia - controlled with a STATIN. Denies se/ar. Say she has been trying to eat healthier.    Hypothyroidism - takes levothyroxine 75 mcg daily. Thyroid function was normal on last labs. Denies se/ar to medication.    Current smoker, she has cut down to smoking 1-2 cigarettes daily after smoking 1 ppd and has smoked for 15 years. She is still doing smoking cessation which she tried the patches but is now doing it on her own.    Mammogram due, previously ordered but she has not had a chance to have it done, number provided to patient today to call and schedule cheli. Hysterectomy done in 2012. Takes estradiol daily.    Had a Colonoscopy done at [facility] by Dr. Valencia in 2018 with 3 polyps found and removed, told to repeat in 3 years so she had it done in June 2023 with one polyp found but due to her not being fully cleaned out, he will repeat in 1 year.       UTD with COVID and FLU vaccines.          Follow-up  Associated symptoms include arthralgias and fatigue. Pertinent negatives include no abdominal pain, chest pain, chills, congestion, coughing, diaphoresis, fever, headaches, nausea, neck pain, numbness, rash, vomiting or weakness.         Past Medical History:   Diagnosis Date    Diabetes mellitus, type 2     GERD (gastroesophageal reflux  disease)     Hyperlipidemia     Hypertension        Past Surgical History:   Procedure Laterality Date    CARPAL TUNNEL RELEASE      HYSTERECTOMY         Family History   Problem Relation Name Age of Onset    Thyroiditis Mother      Heart disease Father      Graves' disease Sister      Heart disease Maternal Grandfather      Cancer Paternal Grandfather Lico Verdin        Social History     Tobacco Use    Smoking status: Every Day     Current packs/day: 0.50     Average packs/day: 0.5 packs/day for 15.0 years (7.5 ttl pk-yrs)     Types: Cigarettes    Smokeless tobacco: Never   Substance Use Topics    Alcohol use: Never    Drug use: Never       Patient Active Problem List   Diagnosis    Degenerative disc disease, cervical    Degenerative disc disease, lumbar    Neck pain    Bilateral arm pain    Low back pain radiating to right leg    Morbid obesity with BMI of 40.0-44.9, adult    Hyperlipidemia    Benign essential HTN    Type 2 diabetes mellitus with neurologic complication, with long-term current use of insulin    Gastroesophageal reflux disease with esophagitis without hemorrhage    Other emphysema       Immunization History   Administered Date(s) Administered    COVID-19, MRNA, LN-S, PF (MODERNA FULL 0.5 ML DOSE) 10/30/2021, 11/27/2021    DTaP 1966, 01/18/1967, 03/08/1967, 04/10/1968, 03/03/1971, 08/01/1977    IPV 1966, 01/18/1967, 03/08/1967, 04/10/1968, 04/07/1978    Influenza - Quadrivalent - PF *Preferred* (6 months and older) 10/14/2021    Measles 11/07/1967    Mumps 11/13/1968    Rubella 10/21/1970    Tdap 04/13/1988, 08/06/2004           Review of Systems   Constitutional:  Positive for fatigue. Negative for activity change, appetite change, chills, diaphoresis and fever.   HENT:  Negative for congestion, ear pain, sinus pain, tinnitus and trouble swallowing.    Eyes:  Negative for visual disturbance.   Respiratory:  Negative for cough, chest tightness, shortness of breath and wheezing.   "  Cardiovascular:  Negative for chest pain, palpitations and leg swelling.   Gastrointestinal:  Negative for abdominal distention, abdominal pain, blood in stool, constipation, diarrhea, nausea and vomiting.   Endocrine: Negative for cold intolerance, heat intolerance, polydipsia, polyphagia and polyuria.   Genitourinary:  Negative for decreased urine volume, dysuria, frequency, hematuria and urgency.   Musculoskeletal:  Positive for arthralgias. Negative for back pain, gait problem and neck pain.   Skin:  Positive for color change (shins). Negative for rash.   Neurological:  Negative for dizziness, syncope, weakness, light-headedness, numbness and headaches.   Psychiatric/Behavioral:  Negative for behavioral problems, confusion and dysphoric mood. The patient is not nervous/anxious.      Objective:     Vitals:    07/30/24 1119   BP: 117/76   BP Location: Left arm   Patient Position: Sitting   BP Method: Large (Automatic)   Pulse: 82   Resp: 18   Temp: 98 °F (36.7 °C)   TempSrc: Oral   SpO2: 95%   Weight: 115.8 kg (255 lb 3.2 oz)   Height: 5' 5" (1.651 m)       Physical Exam  Vitals and nursing note reviewed.   Constitutional:       General: She is not in acute distress.     Appearance: Normal appearance. She is not diaphoretic.   HENT:      Head: Normocephalic and atraumatic.   Eyes:      General:         Right eye: No discharge.         Left eye: No discharge.      Pupils: Pupils are equal, round, and reactive to light.   Cardiovascular:      Rate and Rhythm: Normal rate and regular rhythm.      Heart sounds: Normal heart sounds.   Pulmonary:      Effort: Pulmonary effort is normal.      Breath sounds: Normal breath sounds. No stridor. No wheezing or rales.   Abdominal:      Palpations: Abdomen is soft.      Tenderness: There is no abdominal tenderness. There is no guarding.   Musculoskeletal:         General: Tenderness (knees) present. Normal range of motion.      Cervical back: Normal range of motion.      " Right lower leg: No edema.      Left lower leg: No edema.   Lymphadenopathy:      Cervical: No cervical adenopathy.   Skin:     General: Skin is warm and dry.      Capillary Refill: Capillary refill takes less than 2 seconds.      Findings: No rash.   Neurological:      General: No focal deficit present.      Mental Status: She is alert and oriented to person, place, and time.   Psychiatric:         Mood and Affect: Mood and affect normal.         Behavior: Behavior normal. Behavior is cooperative.         Thought Content: Thought content normal.         Judgment: Judgment normal.         Orders Only on 07/25/2024   Component Date Value Ref Range Status    Microalb, Ur 07/25/2024 3.9  0 - 20 mg/L Final    Creatinine, Urine 07/25/2024 6.1 (L)  10 - 175 mg/dL Final    Microalb/Creat Ratio 07/25/2024 63 (H)  0 - 30 mg/g Final   Orders Only on 07/25/2024   Component Date Value Ref Range Status    Estimated Avg Glucose 07/25/2024 175  mg/dL Final    Hemoglobin A1C 07/25/2024 7.1 (H)  4.2 - 6.3 % Final   Orders Only on 07/25/2024   Component Date Value Ref Range Status    Sodium 07/25/2024 135  131 - 143 mmol/L Final    Potassium 07/25/2024 4.8  3.5 - 5.1 mmol/L Final    Chloride 07/25/2024 99  98 - 107 mmol/L Final    CO2 07/25/2024 29  21 - 32 mmol/L Final    Anion Gap 07/25/2024 7.0  3.0 - 11.0 mmol/L Final    BUN 07/25/2024 18.0  7.0 - 18.0 mg/dL Final    Creatinine 07/25/2024 1.34 (H)  0.55 - 1.02 mg/dL Final    GFR ESTIMATION 07/25/2024 41 (L)  >60 Final    BUN/Creatinine Ratio 07/25/2024 13.43  Ratio Final    Glucose 07/25/2024 181 (H)  74 - 106 mg/dL Final    Calcium 07/25/2024 9.8  8.5 - 10.1 mg/dL Final    Total Bilirubin 07/25/2024 0.3  0.2 - 1.0 mg/dL Final    AST 07/25/2024 21  15 - 37 U/L Final    ALT 07/25/2024 28  13 - 56 U/L Final    Total Protein 07/25/2024 7.5  6.4 - 8.2 g/dL Final    Albumin 07/25/2024 3.6  3.4 - 5.0 g/dL Final    Alkaline Phosphatase 07/25/2024 132 (H)  45 - 117 U/L Final     Cholesterol 07/25/2024 226 (H)  <200 mg/dL Final    Triglycerides 07/25/2024 274 (H)  0 - 149 mg/dL Final    HDL 07/25/2024 53  >39 mg/dL Final    LDL Cholesterol 07/25/2024 118.2  mg/dL Final    VLDL 07/25/2024 55  mg/dL Final    Free T4 07/25/2024 1.11  0.76 - 1.46 ng/dL Final    TSH 07/25/2024 2.070  0.358 - 3.74 uIU/mL Final   Orders Only on 07/25/2024   Component Date Value Ref Range Status    WBC 07/25/2024 10.2 (H)  4.5 - 10.0 10*3/uL Final    RBC 07/25/2024 4.66  4.20 - 5.40 10*6/uL Final    Hemoglobin 07/25/2024 12.9  12.0 - 16.0 g/dL Final    Hematocrit 07/25/2024 41.1  37.0 - 47.0 % Final    MCV 07/25/2024 88.2  80.0 - 99.0 fL Final    MCH 07/25/2024 27.7  27.0 - 32.0 pg Final    MCHC 07/25/2024 31.4 (L)  32.0 - 36.0 % Final    RDW RBC Auto-Rto 07/25/2024 17.2 (H)  0.0 - 15.5 % Final    Platelets 07/25/2024 229  130 - 400 10*3/uL Final    MPV 07/25/2024 11.2  9.2 - 12.2 fL Final    Neutrophils 07/25/2024 56.7  50 - 80 % Final    Immature Granulocytes 07/25/2024 1.00 (H)  0.0 - 0.43 % Final    Lymphocytes 07/25/2024 23.4  20.0 - 45.0 % Final    Monocytes 07/25/2024 9.9  2 - 10 % Final    Eosinophils 07/25/2024 8.0 (H)  0 - 6 % Final    Basophils 07/25/2024 1.0  0 - 3 % Final    nRBC# 07/25/2024 0.0  0 - 0.2 % Final    Neutrophils Absolute 07/25/2024 5.8  1.4 - 7.0 10*3/uL Final    Immature Granulocytes Absolute 07/25/2024 0.1000 (H)  0.0 - 0.0310 thou/uL Final    Lymphocytes Absolute 07/25/2024 2.4  1.2 - 4.0 10*3/uL Final    Monocytes Absolute 07/25/2024 1.0 (H)  0.1 - 0.8 10*3/uL Final    Eosinophils Absolute 07/25/2024 0.8 (H)  0.0 - 0.6 10*3/uL Final    Basophils Absolute 07/25/2024 0.1  0.0 - 0.3 10*3/uL Final    nRBC Count Absolute 07/25/2024 0.000  0 - 0.012 thou/uL Final         Assessment:      1. Benign essential HTN    2. Other emphysema    3. Mixed hyperlipidemia    4. Type 2 diabetes mellitus with diabetic polyneuropathy, with long-term current use of insulin    5. Morbid obesity with BMI of  40.0-44.9, adult    6. Gastroesophageal reflux disease with esophagitis without hemorrhage    7. Other specified hypothyroidism    8. Other ascites    9. SOB (shortness of breath)    10. Wheezing    11. Urine frequency          Plan:     Benign essential HTN  -     Comprehensive Metabolic Panel; Future; Expected date: 08/01/2024  -     TSH w/reflex to FT4; Future; Expected date: 08/01/2024    Other emphysema    Mixed hyperlipidemia    Type 2 diabetes mellitus with diabetic polyneuropathy, with long-term current use of insulin  -     Hemoglobin A1C; Future; Expected date: 08/01/2024  -     Comprehensive Metabolic Panel; Future; Expected date: 08/01/2024    Morbid obesity with BMI of 40.0-44.9, adult    Gastroesophageal reflux disease with esophagitis without hemorrhage    Other specified hypothyroidism  Comments:  Continue levothyroxine daily, controlled  Orders:  -     levothyroxine (SYNTHROID) 88 MCG tablet; Take 1 tablet (88 mcg total) by mouth before breakfast.  Dispense: 90 tablet; Refill: 1  -     TSH w/reflex to FT4; Future; Expected date: 08/01/2024    Other ascites  -     furosemide (LASIX) 40 MG tablet; Take 1 tablet (40 mg total) by mouth once daily.  Dispense: 90 tablet; Refill: 1    SOB (shortness of breath)  -     albuterol (PROVENTIL/VENTOLIN HFA) 90 mcg/actuation inhaler; INHALE 2 PUFFS BY MOUTH EVERY EVENING AS NEEDED for SHORTNESS OF BREATH or FOR WHEEZING thank you**ynes** :-)  Dispense: 18 g; Refill: 2    Wheezing  -     albuterol (PROVENTIL/VENTOLIN HFA) 90 mcg/actuation inhaler; INHALE 2 PUFFS BY MOUTH EVERY EVENING AS NEEDED for SHORTNESS OF BREATH or FOR WHEEZING thank you**ynes** :-)  Dispense: 18 g; Refill: 2    Urine frequency  -     Urinalysis, Reflex to Urine Culture Urine, Clean Catch; Future; Expected date: 08/01/2024         Current Outpatient Medications   Medication Sig Dispense Refill    amitriptyline (ELAVIL) 50 MG tablet TAKE 1 TABLET EVERY EVENING AS DIRECTED 90 tablet 3     "ammonium lactate (LAC-HYDRIN) 12 % lotion APPLY TO AFFECTED AREA 2 TIMES A DAY thank stephenie -)      atorvastatin (LIPITOR) 80 MG tablet Take 1 tablet (80 mg total) by mouth every evening. 90 tablet 3    azelastine (ASTELIN) 137 mcg (0.1 %) nasal spray USE 1 SPRAY NASALLY TWICE DAILY 60 mL 3    cholecalciferol, vitamin D3, 1,250 mcg (50,000 unit) capsule TAKE 1 CAPSULE BY MOUTH WEEKLY AS DIRECTED 12 capsule 3    cyclobenzaprine (FLEXERIL) 10 MG tablet TAKE 1 TABLET BY MOUTH 3 TIMES DAILY AS NEEDED FOR MUSCLE SPASM(S). MAY MAKE DROWSY 90 tablet 11    DULoxetine (CYMBALTA) 30 MG capsule TAKE 1 CAPSULE TWICE DAILY AS NEEDED FOR PAIN 180 capsule 3    estradioL (ESTRACE) 1 MG tablet Take 1 tablet (1 mg total) by mouth once daily. 90 tablet 3    fluticasone propionate (FLONASE) 50 mcg/actuation nasal spray USE 1 SPRAY IN EACH NOSTRIL ONE TIME DAILY 32 g 3    folic acid (FOLVITE) 1 MG tablet TAKE 1 TABLET BY MOUTH DAILY. 90 tablet 3    ipratropium-albuteroL (COMBIVENT RESPIMAT)  mcg/actuation inhaler Inhale 1 puff into the lungs every 6 (six) hours as needed for Wheezing or Shortness of Breath. Rescue 12 g 3    loratadine (CLARITIN) 10 mg tablet Take 10 mg by mouth.      losartan (COZAAR) 100 MG tablet Take 1 tablet (100 mg total) by mouth once daily. 90 tablet 3    meloxicam (MOBIC) 7.5 MG tablet TAKE 1 TABLET BY MOUTH 2 TIMES A DAY FOR 10 TO 14 DAYS WITH FOOD AND REST FOR 2 TO 4 DAYS AND CONTINUE IF PAIN REOCCURS 180 tablet 3    MOUNJARO 15 mg/0.5 mL PnIj Inject 15 mg into the skin once a week. 4 pen 2    potassium chloride SA (K-DUR,KLOR-CON) 20 MEQ tablet TAKE 1 TABLET EVERY NIGHT 90 tablet 3    pregabalin (LYRICA) 300 MG Cap Take 1 capsule (300 mg total) by mouth 2 (two) times daily. 180 capsule 3    TOUJEO MAX U-300 SOLOSTAR 300 unit/mL (3 mL) insulin pen INJECT 100 UNITS SUBCUTANEOUS once DAILY      TRUEPLUS PEN NEEDLE 32 gauge x 5/32" Ndle USE DAILY OR AS DIRECTED      XIGDUO XR 5-1,000 mg TAKE 2 TABLETS BY " MOUTH EACH MORNING 180 tablet 3    albuterol (PROVENTIL/VENTOLIN HFA) 90 mcg/actuation inhaler INHALE 2 PUFFS BY MOUTH EVERY EVENING AS NEEDED for SHORTNESS OF BREATH or FOR WHEEZING thank you**ynes** :-) 18 g 2    furosemide (LASIX) 40 MG tablet Take 1 tablet (40 mg total) by mouth once daily. 90 tablet 1    levothyroxine (SYNTHROID) 88 MCG tablet Take 1 tablet (88 mcg total) by mouth before breakfast. 90 tablet 1    varenicline (CHANTIX) 0.5 MG Tab Take one tablet by mouth daily for 3 days, take one tablet by mouth twice daily for 4 days (take this bottle first) 11 tablet 0    varenicline (CHANTIX) 1 mg Tab Take 1 tablet (1 mg total) by mouth 2 (two) times daily. 56 tablet 0     No current facility-administered medications for this visit.       Medications Discontinued During This Encounter   Medication Reason    diclofenac (VOLTAREN) 75 MG EC tablet Alternate therapy    levothyroxine (SYNTHROID) 88 MCG tablet Reorder    furosemide (LASIX) 40 MG tablet Reorder    albuterol (PROVENTIL/VENTOLIN HFA) 90 mcg/actuation inhaler Reorder       Health Maintenance   Topic Date Due    Eye Exam  Never done    TETANUS VACCINE  08/06/2014    Shingles Vaccine (1 of 2) Never done    Colorectal Cancer Screening  06/27/2024    Mammogram  07/07/2024    Foot Exam  09/11/2024    Hemoglobin A1c  01/25/2025    Lipid Panel  07/25/2025    Low Dose Statin  08/20/2025    Hepatitis C Screening  Completed       Patient Instructions   RTC in 3 months for F/U or sooner if needed.    Keep appts with specialists as scheduled.    Patient Education       Controlling Your Blood Pressure Through Lifestyle   The Basics   Written by the doctors and editors at [facility]   What does my lifestyle have to do with my blood pressure? -- The things you do and the foods you eat have a big effect on your blood pressure and your overall health. Following the right lifestyle can:  Lower your blood pressure or keep you from getting high blood pressure in the first  "place  Reduce your need for blood pressure medicines  Make medicines for high blood pressure work better, if you do take them  Lower the chances that you'll have a heart attack or stroke, or develop kidney disease  Which lifestyle choices will help lower my blood pressure? -- Here's what you can do:  Lose weight (if you are overweight)  Choose a diet rich in fruits, vegetables, and low-fat dairy products, and low in meats, sweets, and refined grains  Eat less salt (sodium)  Do something active for at least 30 minutes a day on most days of the week  Limit the amount of alcohol you drink  If you have high blood pressure, it's also very important to quit smoking (if you smoke). Quitting smoking might not bring your blood pressure down. But it will lower the chances that you'll have a heart attack or stroke, and it will help you feel better and live longer.  Start low and go slow -- The changes listed above might sound like a lot, but don't worry. You don't have to change everything all at once. The key to improving your lifestyle is to "start low and go slow." Choose 1 small, specific thing to change and try doing it for a while. If it works for you, keep doing it until it becomes a habit. If it doesn't, don't give up. Choose something else to change and see how that goes.  Let's say, for example, that you would like to improve your diet. If you're the type of person who eats cheeseburgers and French fries all the time, you can't switch to eating just salads from one day to the next. When people try to make changes like that, they often fail. Then they feel frustrated and tend to give up. So instead of trying to change everything about your diet in 1 day, change 1 or 2 small things about your diet and give yourself time to get used to those changes. For instance, keep the cheeseburger but give up the French fries. Or eat the same things but cut your portions in half.  As you find things that you are able to change and " "stick with, keep adding new changes. In time, you will see that you can actually change a lot. You just have to get used to the changes slowly.  Lose weight -- When people think about losing weight, they sometimes make it more complicated than it really is. To lose weight, you have to either eat less or move more. If you do both of those things, it's even better. But there is no single weight-loss diet or activity that's better than any other. When it comes to weight loss, the most effective plan is the one that you'll stick with.  Improve your diet -- There is no single diet that is right for everyone. But in general, a healthy diet can include:  Lots of fruits, vegetables, and whole grains  Some beans, peas, lentils, chickpeas, and similar foods  Some nuts, such as walnuts, almonds, and peanuts  Fat-free or low-fat milk and milk products  Some fish  To have a healthy diet, it's also important to limit or avoid sugar, sweets, meats, and refined grains. (Refined grains are found in white bread, white rice, most forms of pasta, and most packaged "snack" foods.)  Reduce salt -- Many people think that eating a low-sodium diet means avoiding the salt shaker and not adding salt when cooking. The truth is, not adding salt at the table or when you cook will only help a little. Almost all of the sodium you eat is already in the food you buy at the grocery store or at restaurants (figure 1).  The most important thing you can do to cut down on sodium is to eat less processed food. That means that you should avoid most foods that are sold in cans, boxes, jars, and bags. You should also eat in restaurants less often.  To reduce the amount of sodium you get, buy fresh or fresh-frozen fruits, vegetables, and meats. (Fresh-frozen foods have had nothing added to them before freezing.) Then you can make meals at home, from scratch, with these ingredients.  As with the other changes, don't try to cut out salt all at once. Instead, " "choose 1 or 2 foods that have a lot of sodium and try to replace them with low-sodium choices. When you get used to those low-sodium options, find another food or 2 to change. Then keep going, until all the foods you eat are sodium-free or low in sodium.  Become more active -- If you want to be more active, you don't have to go to the gym or get all sweaty. It is possible to increase your activity level while doing everyday things you enjoy. Walking, gardening, and dancing are just a few of the things that you might try. As with all the other changes, the key is not to do too much too fast. If you don't do any activity now, start by walking for just a few minutes every other day. Do that for a few weeks. If you stick with it, try doing it for longer. But if you find that you don't like walking, try a different activity.  Drink less alcohol -- If you are a woman, do not have more than 1 "standard drink" of alcohol a day. If you are a man, do not have more than 2. A "standard drink" is:  A can or bottle that has 12 ounces of beer  A glass that has 5 ounces of wine  A shot that has 1.5 ounces of whiskey  Where should I start? -- If you want to improve your lifestyle, start by making the changes that you think would be easiest for you. If you used to exercise and just got out of the habit, maybe it would be easy for you to start exercising again. Or if you actually like cooking meals from scratch, maybe the first thing you should focus on is eating home-cooked meals that are low in sodium.  Whatever you tackle first, choose specific, realistic goals, and give yourself a deadline. For example, do not decide that you are going to "exercise more." Instead, decide that you are going to walk for 10 minutes on Monday, Wednesday, and Friday, and that you are going to do this for the next 2 weeks.  When lifestyle changes are too general, people have a hard time following through.  Now go. You can do it!  All topics are updated " as new evidence becomes available and our peer review process is complete.  This topic retrieved from [employer] on: Sep 21, 2021.  Topic 31199 Version 8.0  Release: 29.4.2 - C29.263  © 2021 [employer] and/or its affiliates. All rights reserved.  figure 1: Sources of sodium in your diet     Graphic 23347 Version 2.0    Consumer Information Use and Disclaimer   This information is not specific medical advice and does not replace information you receive from your health care provider. This is only a brief summary of general information. It does NOT include all information about conditions, illnesses, injuries, tests, procedures, treatments, therapies, discharge instructions or life-style choices that may apply to you. You must talk with your health care provider for complete information about your health and treatment options. This information should not be used to decide whether or not to accept your health care provider's advice, instructions or recommendations. Only your health care provider has the knowledge and training to provide advice that is right for you. The use of this information is governed by the [employer] End User License Agreement, available at https://www.[employer]/en/solutions/[employer]/about/bhargav.The use of [employer] content is governed by the [employer] Terms of Use. ©2021 Up[employer]Date, Inc. All rights reserved.  Copyright   © 2021 UpToDate, Inc. and/or its affiliates. All rights reserved.    Patient Education       Diabetes and Diet   The Basics   Written by the doctors and editors at [employer]   Why is diet important in diabetes? -- Diet is important because it is part of diabetes treatment. Many people need to change what they eat and how much they eat to help treat their diabetes. It is important for people to treat their diabetes so that they:  Keep their blood sugar at or near a normal level  Prevent long-term problems, such as heart or kidney problems, that can happen in people with  "diabetes  Changing your diet can also help treat obesity, high blood pressure, and high cholesterol. These conditions can affect people with diabetes and can lead to future problems, such as heart attacks or strokes.  Who will work with me to change my diet? -- Your doctor or nurse will work with you to make a food plan to change your diet. They might also recommend that you work with a "dietitian." A dietitian is an expert on food and eating.  Do I need to eat at the same times every day? -- When and how often you should eat depends, in part, on the diabetes medicines you take. For example:  People who take about the same amount of insulin at the same time each day (called a "fixed regimen") should eat meals at the same times. This is also true for people who take pills that increase insulin levels, such as sulfonylureas. Eating meals at the same time every day helps prevent low blood sugar.  People who adjust the dose and timing of their insulin each day (called a "flexible regimen") do not always have to eat meals at the same time. That's because they can time their insulin dose for before they plan to eat, and also adjust the dose for how much they plan to eat.  People who take medicines that don't usually cause low blood sugar, such as metformin, don't have to eat meals at the same time every day.  What do I need to think about when planning what to eat? -- Our bodies break down the food we eat into small pieces called carbohydrates, proteins, and fats.  When planning what to eat, people with diabetes need to think about:  Carbohydrates (or "carbs") - Carbohydrates, which are sugars that our bodies use for energy, can raise a person's blood sugar level. Your doctor, nurse, or dietitian will tell you how many carbohydrates you should eat at each meal or snack. Foods that have carbohydrates include:  Bread, pasta, and rice  Vegetables and fruits  Dairy foods  Foods and drinks with added sugar  It is best to get " "your carbohydrates from fruits, vegetables, whole grains, and low-fat milk. It is best to avoid drinks with added sugar, like soda, juices, and sports drinks.   Protein - Your doctor, nurse, or dietitian will tell you how much protein you should eat each day. It is best to eat lean meats, fish, eggs, beans, peas, soy products, nuts, and seeds.  Fats - The type of fat you eat is more important than the amount of fat. "Saturated" and "trans" fats can increase your risk for heart problems, like a heart attack.  Foods that have saturated fats include meat, butter, cheese, and ice cream.  Foods that have trans fats include processed food with "partially hydrogenated oils" on the ingredient list. This may include fried foods, store bought cookies, muffins, pies, and cakes.  "Monounsaturated" and "polyunsaturated" fats are better for you. Foods with these types of fat include fish, avocado, olive oil, and nuts.  Calories - People need to eat a certain amount of calories each day to keep their weight the same. People who are overweight and want to lose weight need to eat fewer calories each day.  Fiber - Eating foods with a lot of fiber can help control a person's blood sugar level. Foods that have a lot of fiber include apples, green beans, peas, beans, lentils, nuts, oatmeal, and whole grains.  Salt - People who have high blood pressure should not eat foods that contain a lot of salt (also called sodium). People with high blood pressure should also eat healthy foods, such as fruits, vegetables, and low-fat dairy foods.  Alcohol - Having more than 1 drink (for women) or 2 drinks (for men) a day can raise blood sugar levels. Also, drinks that have fruit juice or soda in them can raise blood sugar levels.  What can I do if I need to lose weight? -- If you need to lose weight, you can:  Exercise - Try to get at least 30 minutes of physical activity a day, most days of the week. Even gentle exercise, like walking, is good for " your health. Some people with diabetes need to change their medicine dose before they exercise. They might also need to check their blood sugar levels before and after exercising.  Eat fewer calories - Your doctor, nurse, or dietitian can tell you how many calories you should eat each day in order to lose weight.  If you are worried about your weight, size, or shape, talk with your doctor, nurse, or dietitian. They can help you make changes to improve your health.  Can I eat the same foods as my family? -- Yes. You do not need to eat special foods if you have diabetes. You and your family can eat the same foods. Changing your diet is mostly about eating healthy foods and not eating too much.  What are the other parts of diabetes treatment? -- Besides changing your diet, the other parts of diabetes treatment are:  Exercise  Medicines  Some people with diabetes need to learn how to match their diet and exercise with their medicine dose. For example, people who use insulin might need to choose the dose of insulin they give themselves. To choose their dose, they need to think about:  What they plan to eat at the next meal  How much exercise they plan to do  What their blood sugar level is  If the diet and exercise do not match the medicine dose, a person's blood sugar level can get too low or too high. Blood sugar levels that are too low or too high can cause problems.  All topics are updated as new evidence becomes available and our peer review process is complete.  This topic retrieved from [employer] on: Sep 21, 2021.  Topic 68684 Version 7.0  Release: 29.4.2 - C29.263  © 2021 UpToDate, Inc. and/or its affiliates. All rights reserved.  Consumer Information Use and Disclaimer   This information is not specific medical advice and does not replace information you receive from your health care provider. This is only a brief summary of general information. It does NOT include all information about conditions, illnesses,  injuries, tests, procedures, treatments, therapies, discharge instructions or life-style choices that may apply to you. You must talk with your health care provider for complete information about your health and treatment options. This information should not be used to decide whether or not to accept your health care provider's advice, instructions or recommendations. Only your health care provider has the knowledge and training to provide advice that is right for you. The use of this information is governed by the [employer] End User License Agreement, available at https://www.[employer]/en/solutions/[employer]/about/bhargav.The use of [employer] content is governed by the [employer] Terms of Use. ©2021 UpToDate, Inc. All rights reserved.  Copyright   © 2021 UpToDate, Inc. and/or its affiliates. All rights reserved.    Patient Education       Low Cholesterol, Saturated Fat, and Trans Fat Diet   About this topic   Cholesterol, saturated fat, and trans fat are in many foods. These may raise your blood cholesterol levels. If your cholesterol is too high, this can cause health problems in your heart, liver, kidneys, and even your eyes. The key to lowering your risk of heart problems is to lower your bad fat intake.  Saturated fats and trans fats are the bad fats. These fats clog your arteries and raise your bad cholesterol. Saturated fats and trans fats are solid fats at room temperature. Saturated fats are animal fats. Trans fats are manmade fats. They add flavor to a lot of packaged foods. Staying away from saturated and trans fats will help your heart.  When you do eat foods with fat, make sure they have the good fats. Monounsaturated and polyunsaturated fats are good fats. These fats help raise your good cholesterol and protect your heart.  General   How to Lower Fat and Cholesterol in Your Diet   Read the labels of the foods you buy from the market to find out how much fat is present. Under 5% of total fat on a label  "means it is "low fat". Over 20% of total fat on a label means it is high fat.  Eat high fiber foods, like soluble fiber. This type of fiber helps lower cholesterol in the body. Choose oatmeal, fruits (like apples), beans, and nuts to get the most soluble fiber.  Eat foods high in omega-3 fatty acids like gopal seeds, walnuts, salmon, tuna, trout, herring, flaxseed, and soybeans. These foods help keep the heart healthy.  Limit your bad fat and oil intake.  Stay away from butter, stick margarine, shortening, lard, and palm and coconut oil. Pick plant-based spreads instead.  Limit mayonnaise, salad dressings, gravies, and sauces, unless it is made from low-fat ingredients.  Limit chocolate.  Do not eat high-fat processed foods like hot dogs, feliciano, sausage, ham and other luncheon meats high in fat, and some frozen foods. Pick fish, chicken, turkey, and lean meats instead.  Eat more dried beans, lentils, and tofu to get your protein.  Do not eat organ meats, like liver.  Choose nonfat or low-fat milk, yogurt, and cheese.  Use light or fat-free cream cheese and sour cream.  Eat lots of fruits and vegetables.  Pick whole grain breads, cereals, pastas, and rice.  Do not eat snacks that are high in fats like granola, cookies, pies, pastries, doughnuts, and croissants.  Stay away from deep fried foods.  Help When Cooking   Remove the fat portion of meats and the skin from poultry before cooking.  Bake, broil, grill, poach, or roast poultry, fish, and lean meats.  Drain and throw away the fat that drains out of meat as you cook it.  Try to add little or no fat to foods.  Use olive or canola oil for cooking or baking.  Steam your vegetables.  Use herbs or no-oil marinades to flavor foods.         Who should use this diet?   This diet is for people who are at high risk of getting health problems like heart disease, high blood pressure, diabetes, and others. This diet is also good for all people to follow to keep your heart " healthy.  What foods are good to eat?   Foods with good fats are:  Canola, peanut, and olive oil  Safflower, soybean, and corn oil  Walnuts, almonds, cashews, and peanuts  Pumpkin and sunflower seeds  [place] and tuna  Tofu  Soymilk  Avocado  What foods should be limited or avoided?   Stay away from these types of foods that have saturated fats:  Whole fat dairy products like cheese, ice cream, whole milk, and cream  Palm and coconut oils  High-fat meats like beef, lamb, poultry with the skin, feliciano, and sausage  Butter and lard  Stay away from these types of foods that may have trans fat:  Cookies, cakes, candy, doughnuts, baked goods, muffins, pizza dough, and pie crusts that are packaged  Fried foods  Frozen dinners  Chips and crackers  Microwave popcorn  Stick margarine and vegetable shortenings  Helpful tips   To help stay away from saturated fat:  Pick lean cuts of meat  Take the skin off chicken and turkey or pick skinless  Pick low-fat cheese, milk, and ice cream  Use liquid oils when cooking and baking, such as olive oil and canola oil  To help stay away from trans fat:  Look at your labels. Choose foods with 0% trans fat. Read the ingredient list. Avoid foods with partially hydrogenated oil in the ingredient list. This means there is trans fat in the product.  Where can I learn more?   American Heart Association   http://www.heart.org/HEARTORG/Conditions/Cholesterol/PreventionTreatmentofHighCholesterol/Know-Your-Fats_[facility]_305628_Article.jsp   Last Reviewed Date   2021-10-05  Consumer Information Use and Disclaimer   This information is not specific medical advice and does not replace information you receive from your health care provider. This is only a brief summary of general information. It does NOT include all information about conditions, illnesses, injuries, tests, procedures, treatments, therapies, discharge instructions or life-style choices that may apply to you. You must talk with your health care  provider for complete information about your health and treatment options. This information should not be used to decide whether or not to accept your health care providers advice, instructions or recommendations. Only your health care provider has the knowledge and training to provide advice that is right for you.   Copyright   Copyright © 2021 UpToDate, Inc. and its affiliates and/or licensors. All rights reserved.          Risks, benefits, and alternatives discussed with patient, Patient verbalized understanding of discussed plan of care. Asked patient if any further questions, answered no.    Future Appointments   Date Time Provider Department Center   8/27/2024  4:00 PM Ellie Braswell RRT [facility]   9/3/2024  2:00 PM Ellie Braswell RRT [facility]   10/30/2024 11:20 AM Yaima Pulliam, NP [facility] Pulliam NP

## 2024-07-30 NOTE — PATIENT INSTRUCTIONS
"RTC in 3 months for F/U or sooner if needed.    Keep appts with specialists as scheduled.    Patient Education       Controlling Your Blood Pressure Through Lifestyle   The Basics   Written by the doctors and editors at [facility]   What does my lifestyle have to do with my blood pressure? -- The things you do and the foods you eat have a big effect on your blood pressure and your overall health. Following the right lifestyle can:  Lower your blood pressure or keep you from getting high blood pressure in the first place  Reduce your need for blood pressure medicines  Make medicines for high blood pressure work better, if you do take them  Lower the chances that you'll have a heart attack or stroke, or develop kidney disease  Which lifestyle choices will help lower my blood pressure? -- Here's what you can do:  Lose weight (if you are overweight)  Choose a diet rich in fruits, vegetables, and low-fat dairy products, and low in meats, sweets, and refined grains  Eat less salt (sodium)  Do something active for at least 30 minutes a day on most days of the week  Limit the amount of alcohol you drink  If you have high blood pressure, it's also very important to quit smoking (if you smoke). Quitting smoking might not bring your blood pressure down. But it will lower the chances that you'll have a heart attack or stroke, and it will help you feel better and live longer.  Start low and go slow -- The changes listed above might sound like a lot, but don't worry. You don't have to change everything all at once. The key to improving your lifestyle is to "start low and go slow." Choose 1 small, specific thing to change and try doing it for a while. If it works for you, keep doing it until it becomes a habit. If it doesn't, don't give up. Choose something else to change and see how that goes.  Let's say, for example, that you would like to improve your diet. If you're the type of person who eats cheeseburgers and French fries all " "the time, you can't switch to eating just salads from one day to the next. When people try to make changes like that, they often fail. Then they feel frustrated and tend to give up. So instead of trying to change everything about your diet in 1 day, change 1 or 2 small things about your diet and give yourself time to get used to those changes. For instance, keep the cheeseburger but give up the French fries. Or eat the same things but cut your portions in half.  As you find things that you are able to change and stick with, keep adding new changes. In time, you will see that you can actually change a lot. You just have to get used to the changes slowly.  Lose weight -- When people think about losing weight, they sometimes make it more complicated than it really is. To lose weight, you have to either eat less or move more. If you do both of those things, it's even better. But there is no single weight-loss diet or activity that's better than any other. When it comes to weight loss, the most effective plan is the one that you'll stick with.  Improve your diet -- There is no single diet that is right for everyone. But in general, a healthy diet can include:  Lots of fruits, vegetables, and whole grains  Some beans, peas, lentils, chickpeas, and similar foods  Some nuts, such as walnuts, almonds, and peanuts  Fat-free or low-fat milk and milk products  Some fish  To have a healthy diet, it's also important to limit or avoid sugar, sweets, meats, and refined grains. (Refined grains are found in white bread, white rice, most forms of pasta, and most packaged "snack" foods.)  Reduce salt -- Many people think that eating a low-sodium diet means avoiding the salt shaker and not adding salt when cooking. The truth is, not adding salt at the table or when you cook will only help a little. Almost all of the sodium you eat is already in the food you buy at the grocery store or at restaurants (figure 1).  The most important thing " "you can do to cut down on sodium is to eat less processed food. That means that you should avoid most foods that are sold in cans, boxes, jars, and bags. You should also eat in restaurants less often.  To reduce the amount of sodium you get, buy fresh or fresh-frozen fruits, vegetables, and meats. (Fresh-frozen foods have had nothing added to them before freezing.) Then you can make meals at home, from scratch, with these ingredients.  As with the other changes, don't try to cut out salt all at once. Instead, choose 1 or 2 foods that have a lot of sodium and try to replace them with low-sodium choices. When you get used to those low-sodium options, find another food or 2 to change. Then keep going, until all the foods you eat are sodium-free or low in sodium.  Become more active -- If you want to be more active, you don't have to go to the gym or get all sweaty. It is possible to increase your activity level while doing everyday things you enjoy. Walking, gardening, and dancing are just a few of the things that you might try. As with all the other changes, the key is not to do too much too fast. If you don't do any activity now, start by walking for just a few minutes every other day. Do that for a few weeks. If you stick with it, try doing it for longer. But if you find that you don't like walking, try a different activity.  Drink less alcohol -- If you are a woman, do not have more than 1 "standard drink" of alcohol a day. If you are a man, do not have more than 2. A "standard drink" is:  A can or bottle that has 12 ounces of beer  A glass that has 5 ounces of wine  A shot that has 1.5 ounces of whiskey  Where should I start? -- If you want to improve your lifestyle, start by making the changes that you think would be easiest for you. If you used to exercise and just got out of the habit, maybe it would be easy for you to start exercising again. Or if you actually like cooking meals from scratch, maybe the first " "thing you should focus on is eating home-cooked meals that are low in sodium.  Whatever you tackle first, choose specific, realistic goals, and give yourself a deadline. For example, do not decide that you are going to "exercise more." Instead, decide that you are going to walk for 10 minutes on Monday, Wednesday, and Friday, and that you are going to do this for the next 2 weeks.  When lifestyle changes are too general, people have a hard time following through.  Now go. You can do it!  All topics are updated as new evidence becomes available and our peer review process is complete.  This topic retrieved from [employer] on: Sep 21, 2021.  Topic 60868 Version 8.0  Release: 29.4.2 - C29.263  © 2021 UpToDate, Inc. and/or its affiliates. All rights reserved.  figure 1: Sources of sodium in your diet     Graphic 49743 Version 2.0    Consumer Information Use and Disclaimer   This information is not specific medical advice and does not replace information you receive from your health care provider. This is only a brief summary of general information. It does NOT include all information about conditions, illnesses, injuries, tests, procedures, treatments, therapies, discharge instructions or life-style choices that may apply to you. You must talk with your health care provider for complete information about your health and treatment options. This information should not be used to decide whether or not to accept your health care provider's advice, instructions or recommendations. Only your health care provider has the knowledge and training to provide advice that is right for you. The use of this information is governed by the [employer] End User License Agreement, available at [email or web address] use of [employer] content is governed by the [employer] Terms of Use. ©2021 UpToDate, Inc. All rights reserved.  Copyright   © 2021 UpToDate, Inc. and/or its affiliates. All rights " "reserved.    Patient Education       Diabetes and Diet   The Basics   Written by the doctors and editors at [facility]   Why is diet important in diabetes? -- Diet is important because it is part of diabetes treatment. Many people need to change what they eat and how much they eat to help treat their diabetes. It is important for people to treat their diabetes so that they:  Keep their blood sugar at or near a normal level  Prevent long-term problems, such as heart or kidney problems, that can happen in people with diabetes  Changing your diet can also help treat obesity, high blood pressure, and high cholesterol. These conditions can affect people with diabetes and can lead to future problems, such as heart attacks or strokes.  Who will work with me to change my diet? -- Your doctor or nurse will work with you to make a food plan to change your diet. They might also recommend that you work with a "dietitian." A dietitian is an expert on food and eating.  Do I need to eat at the same times every day? -- When and how often you should eat depends, in part, on the diabetes medicines you take. For example:  People who take about the same amount of insulin at the same time each day (called a "fixed regimen") should eat meals at the same times. This is also true for people who take pills that increase insulin levels, such as sulfonylureas. Eating meals at the same time every day helps prevent low blood sugar.  People who adjust the dose and timing of their insulin each day (called a "flexible regimen") do not always have to eat meals at the same time. That's because they can time their insulin dose for before they plan to eat, and also adjust the dose for how much they plan to eat.  People who take medicines that don't usually cause low blood sugar, such as metformin, don't have to eat meals at the same time every day.  What do I need to think about when planning what to eat? -- Our bodies break down the food we eat into " "small pieces called carbohydrates, proteins, and fats.  When planning what to eat, people with diabetes need to think about:  Carbohydrates (or "carbs") - Carbohydrates, which are sugars that our bodies use for energy, can raise a person's blood sugar level. Your doctor, nurse, or dietitian will tell you how many carbohydrates you should eat at each meal or snack. Foods that have carbohydrates include:  Bread, pasta, and rice  Vegetables and fruits  Dairy foods  Foods and drinks with added sugar  It is best to get your carbohydrates from fruits, vegetables, whole grains, and low-fat milk. It is best to avoid drinks with added sugar, like soda, juices, and sports drinks.   Protein - Your doctor, nurse, or dietitian will tell you how much protein you should eat each day. It is best to eat lean meats, fish, eggs, beans, peas, soy products, nuts, and seeds.  Fats - The type of fat you eat is more important than the amount of fat. "Saturated" and "trans" fats can increase your risk for heart problems, like a heart attack.  Foods that have saturated fats include meat, butter, cheese, and ice cream.  Foods that have trans fats include processed food with "partially hydrogenated oils" on the ingredient list. This may include fried foods, store bought cookies, muffins, pies, and cakes.  "Monounsaturated" and "polyunsaturated" fats are better for you. Foods with these types of fat include fish, avocado, olive oil, and nuts.  Calories - People need to eat a certain amount of calories each day to keep their weight the same. People who are overweight and want to lose weight need to eat fewer calories each day.  Fiber - Eating foods with a lot of fiber can help control a person's blood sugar level. Foods that have a lot of fiber include apples, green beans, peas, beans, lentils, nuts, oatmeal, and whole grains.  Salt - People who have high blood pressure should not eat foods that contain a lot of salt (also called sodium). People " with high blood pressure should also eat healthy foods, such as fruits, vegetables, and low-fat dairy foods.  Alcohol - Having more than 1 drink (for women) or 2 drinks (for men) a day can raise blood sugar levels. Also, drinks that have fruit juice or soda in them can raise blood sugar levels.  What can I do if I need to lose weight? -- If you need to lose weight, you can:  Exercise - Try to get at least 30 minutes of physical activity a day, most days of the week. Even gentle exercise, like walking, is good for your health. Some people with diabetes need to change their medicine dose before they exercise. They might also need to check their blood sugar levels before and after exercising.  Eat fewer calories - Your doctor, nurse, or dietitian can tell you how many calories you should eat each day in order to lose weight.  If you are worried about your weight, size, or shape, talk with your doctor, nurse, or dietitian. They can help you make changes to improve your health.  Can I eat the same foods as my family? -- Yes. You do not need to eat special foods if you have diabetes. You and your family can eat the same foods. Changing your diet is mostly about eating healthy foods and not eating too much.  What are the other parts of diabetes treatment? -- Besides changing your diet, the other parts of diabetes treatment are:  Exercise  Medicines  Some people with diabetes need to learn how to match their diet and exercise with their medicine dose. For example, people who use insulin might need to choose the dose of insulin they give themselves. To choose their dose, they need to think about:  What they plan to eat at the next meal  How much exercise they plan to do  What their blood sugar level is  If the diet and exercise do not match the medicine dose, a person's blood sugar level can get too low or too high. Blood sugar levels that are too low or too high can cause problems.  All topics are updated as new evidence  becomes available and our peer review process is complete.  This topic retrieved from [employer] on: Sep 21, 2021.  Topic 41803 Version 7.0  Release: 29.4.2 - C29.263  © 2021 UpToDate, Inc. and/or its affiliates. All rights reserved.  Consumer Information Use and Disclaimer   This information is not specific medical advice and does not replace information you receive from your health care provider. This is only a brief summary of general information. It does NOT include all information about conditions, illnesses, injuries, tests, procedures, treatments, therapies, discharge instructions or life-style choices that may apply to you. You must talk with your health care provider for complete information about your health and treatment options. This information should not be used to decide whether or not to accept your health care provider's advice, instructions or recommendations. Only your health care provider has the knowledge and training to provide advice that is right for you. The use of this information is governed by the [employer] End User License Agreement, available at [email or web address] use of [employer] content is governed by the [employer] Terms of Use. ©2021 UpToDate, Inc. All rights reserved.  Copyright   © 2021 UpToDate, Inc. and/or its affiliates. All rights reserved.    Patient Education       Low Cholesterol, Saturated Fat, and Trans Fat Diet   About this topic   Cholesterol, saturated fat, and trans fat are in many foods. These may raise your blood cholesterol levels. If your cholesterol is too high, this can cause health problems in your heart, liver, kidneys, and even your eyes. The key to lowering your risk of heart problems is to lower your bad fat intake.  Saturated fats and trans fats are the bad fats. These fats clog your arteries and raise your bad cholesterol. Saturated fats and trans fats are solid fats at room temperature. Saturated fats are animal  "fats. Trans fats are manmade fats. They add flavor to a lot of packaged foods. Staying away from saturated and trans fats will help your heart.  When you do eat foods with fat, make sure they have the good fats. Monounsaturated and polyunsaturated fats are good fats. These fats help raise your good cholesterol and protect your heart.  General   How to Lower Fat and Cholesterol in Your Diet   Read the labels of the foods you buy from the market to find out how much fat is present. Under 5% of total fat on a label means it is "low fat". Over 20% of total fat on a label means it is high fat.  Eat high fiber foods, like soluble fiber. This type of fiber helps lower cholesterol in the body. Choose oatmeal, fruits (like apples), beans, and nuts to get the most soluble fiber.  Eat foods high in omega-3 fatty acids like gopal seeds, walnuts, salmon, tuna, trout, herring, flaxseed, and soybeans. These foods help keep the heart healthy.  Limit your bad fat and oil intake.  Stay away from butter, stick margarine, shortening, lard, and palm and coconut oil. Pick plant-based spreads instead.  Limit mayonnaise, salad dressings, gravies, and sauces, unless it is made from low-fat ingredients.  Limit chocolate.  Do not eat high-fat processed foods like hot dogs, feliciano, sausage, ham and other luncheon meats high in fat, and some frozen foods. Pick fish, chicken, turkey, and lean meats instead.  Eat more dried beans, lentils, and tofu to get your protein.  Do not eat organ meats, like liver.  Choose nonfat or low-fat milk, yogurt, and cheese.  Use light or fat-free cream cheese and sour cream.  Eat lots of fruits and vegetables.  Pick whole grain breads, cereals, pastas, and rice.  Do not eat snacks that are high in fats like granola, cookies, pies, pastries, doughnuts, and croissants.  Stay away from deep fried foods.  Help When Cooking   Remove the fat portion of meats and the skin from poultry before cooking.  Bake, broil, grill, " poach, or roast poultry, fish, and lean meats.  Drain and throw away the fat that drains out of meat as you cook it.  Try to add little or no fat to foods.  Use olive or canola oil for cooking or baking.  Steam your vegetables.  Use herbs or no-oil marinades to flavor foods.         Who should use this diet?   This diet is for people who are at high risk of getting health problems like heart disease, high blood pressure, diabetes, and others. This diet is also good for all people to follow to keep your heart healthy.  What foods are good to eat?   Foods with good fats are:  Canola, peanut, and olive oil  Safflower, soybean, and corn oil  Walnuts, almonds, cashews, and peanuts  Pumpkin and sunflower seeds  [place] and tuna  Tofu  Soymilk  Avocado  What foods should be limited or avoided?   Stay away from these types of foods that have saturated fats:  Whole fat dairy products like cheese, ice cream, whole milk, and cream  Palm and coconut oils  High-fat meats like beef, lamb, poultry with the skin, feliciano, and sausage  Butter and lard  Stay away from these types of foods that may have trans fat:  Cookies, cakes, candy, doughnuts, baked goods, muffins, pizza dough, and pie crusts that are packaged  Fried foods  Frozen dinners  Chips and crackers  Microwave popcorn  Stick margarine and vegetable shortenings  Helpful tips   To help stay away from saturated fat:  Pick lean cuts of meat  Take the skin off chicken and turkey or pick skinless  Pick low-fat cheese, milk, and ice cream  Use liquid oils when cooking and baking, such as olive oil and canola oil  To help stay away from trans fat:  Look at your labels. Choose foods with 0% trans fat. Read the ingredient list. Avoid foods with partially hydrogenated oil in the ingredient list. This means there is trans fat in the product.  Where can I learn more?   American Heart Association    http://[email or web address]-[email or web address]-Fats_[email or web address]_[email or web address]_[email or web address]   Last Reviewed Date   2021-10-05  Consumer Information Use and Disclaimer   This information is not specific medical advice and does not replace information you receive from your health care provider. This is only a brief summary of general information. It does NOT include all information about conditions, illnesses, injuries, tests, procedures, treatments, therapies, discharge instructions or life-style choices that may apply to you. You must talk with your health care provider for complete information about your health and treatment options. This information should not be used to decide whether or not to accept your health care providers advice, instructions or recommendations. Only your health care provider has the knowledge and training to provide advice that is right for you.   Copyright   Copyright © 2021 UpToDate, Inc. and its affiliates and/or licensors. All rights reserved.

## 2024-08-08 ENCOUNTER — TELEPHONE (OUTPATIENT)
Dept: SMOKING CESSATION | Facility: CLINIC | Age: 59
End: 2024-08-08
Payer: MEDICARE

## 2024-08-13 ENCOUNTER — CLINICAL SUPPORT (OUTPATIENT)
Dept: SMOKING CESSATION | Facility: CLINIC | Age: 59
End: 2024-08-13
Payer: COMMERCIAL

## 2024-08-13 DIAGNOSIS — F17.200 NICOTINE DEPENDENCE: Primary | ICD-10-CM

## 2024-08-13 RX ORDER — VARENICLINE TARTRATE 1 MG/1
1 TABLET, FILM COATED ORAL 2 TIMES DAILY
Qty: 56 TABLET | Refills: 0 | Status: SHIPPED | OUTPATIENT
Start: 2024-08-13

## 2024-08-13 RX ORDER — VARENICLINE TARTRATE 0.5 MG/1
TABLET, FILM COATED ORAL
Qty: 11 TABLET | Refills: 0 | Status: SHIPPED | OUTPATIENT
Start: 2024-08-13

## 2024-08-20 ENCOUNTER — CLINICAL SUPPORT (OUTPATIENT)
Dept: SMOKING CESSATION | Facility: CLINIC | Age: 59
End: 2024-08-20
Payer: COMMERCIAL

## 2024-08-20 DIAGNOSIS — F17.200 NICOTINE DEPENDENCE: Primary | ICD-10-CM

## 2024-08-20 NOTE — PROGRESS NOTES
Individual Follow-Up Form    8/20/2024    Quit Date: TBD    Clinical Status of Patient: Outpatient    Length of Service: 60 minutes    Continuing Medication: yes  Chantix     Target Symptoms: Withdrawal and medication side effects. The following were  rated moderate (3) to severe (4) on TCRS:  Moderate (3): None Reported  Severe (4): None Reported    Comments: Spoke with patient at length in clinic regarding tobacco cessation progress. Patient remains on prescribed tobacco cessation medication .5mg and 1mg Chantix without any negative side effects at this time.  Patient remains smoking 17-20 cigarettes per day.  Patient will cut down to 15 cigarettes a day this week.  Reviewed strategies, cues, and triggers. Introduced the negative impact of tobacco on health, the health advantages of discontinuing the use of tobacco, time line improved health changes after a quit, withdrawal issues to expect from nicotine and habit, and ways to achieve the goal of a quit.  Patient states she started medication today.  Counselor discussed the 4ds (delay, drink, distract, deep breathing) to address nicotine cravings.  Counselor recommended that patient stay hydrated and eat fruits and vegetables to help decrease nicotine cravings.   Patient appeared to be receptive to the information given.  Counselor will continue to motivate patient to be tobacco free.     Diagnosis: F17.200    Next Visit: 1 week

## 2024-08-27 ENCOUNTER — CLINICAL SUPPORT (OUTPATIENT)
Dept: SMOKING CESSATION | Facility: CLINIC | Age: 59
End: 2024-08-27
Payer: COMMERCIAL

## 2024-08-27 DIAGNOSIS — F17.200 NICOTINE DEPENDENCE: Primary | ICD-10-CM

## 2024-08-27 NOTE — PROGRESS NOTES
Individual Follow-Up Form    8/27/2024    Quit Date: TBD    Clinical Status of Patient: Outpatient    Length of Service: 60 minutes    Continuing Medication: yes  Chantix     Target Symptoms: Withdrawal and medication side effects. The following were  rated moderate (3) to severe (4) on TCRS:  Moderate (3): None Reported  Severe (4): None Reported    Comments: Spoke with patient at length in clinic regarding tobacco cessation progress. Patient remains on prescribed tobacco cessation medication 1mg Chantix without any negative side effects at this time.  Patient states she only smoked 10 cigarettes one day but smokes 15-16 cigarettes daily.   Patient decreased to 13-15 cigarettes daily.  Reviewed strategies, cues, and triggers. Introduced the negative impact of tobacco on health, the health advantages of discontinuing the use of tobacco, time line improved health changes after a quit, withdrawal issues to expect from nicotine and habit, and ways to achieve the goal of a quit.  Counselor discussed the 4ds (delay, drink, distract, deep breathing) to address nicotine cravings.  Counselor also suggested that patient utilize jolly ranchers, dum dum lollipops, and toffee candy to help control nicotine cravings.  Patient appeared to be receptive to the information given.  Counselor will continue to motivate patient to be tobacco free.    Diagnosis: F17.200    Next Visit: 1 week

## 2024-09-03 ENCOUNTER — CLINICAL SUPPORT (OUTPATIENT)
Dept: SMOKING CESSATION | Facility: CLINIC | Age: 59
End: 2024-09-03
Payer: COMMERCIAL

## 2024-09-03 DIAGNOSIS — F17.200 NICOTINE DEPENDENCE: Primary | ICD-10-CM

## 2024-09-03 RX ORDER — VARENICLINE TARTRATE 1 MG/1
1 TABLET, FILM COATED ORAL 2 TIMES DAILY
Qty: 56 TABLET | Refills: 0 | Status: SHIPPED | OUTPATIENT
Start: 2024-09-03

## 2024-09-03 NOTE — PROGRESS NOTES
Individual Follow-Up Form    9/3/2024    Quit Date: TBD    Clinical Status of Patient: Outpatient    Length of Service: 60 minutes    Continuing Medication: yes  Chantix     Target Symptoms: Withdrawal and medication side effects. The following were  rated moderate (3) to severe (4) on TCRS:  Moderate (3): None Reported  Severe (4): None Reported    Comments: Spoke with patient at length in clinic regarding tobacco cessation progress. Patient remains on prescribed tobacco cessation medication 1mg Chantix without any negative side effects at this time.  Patient decreased to 15 cigarettes daily.  Patient states she trimmed down to 14 cigarettes one day.   Patient will cut down to 10-15 cigarettes a day this week.  Reviewed strategies, habitual behavior, high risks situations, understanding urges and cravings, stress and relaxation with open discussion and additional interventions, Introduced lapses, relapses, understanding them and analyzing the situation of a lapse, conflict issues that may be linked to a lapse.  Counselor suggested that patient set daily smoking limits by limiting the number of cigarettes she has access to daily.  Counselor recommended that patient stay hydrated and eat fruits and vegetables to help decrease nicotine cravings.   Counselor encouraged walking and other healthy activities to help deter her from smoking and allow more time to delay craving.  Counselor submitted refill for 1mg nicotine cravings.  Patient appeared to be receptive to the information given.  Counselor will continue to motivate patient to be tobacco free.    Diagnosis: F17.200    Next Visit: 1 week

## 2024-09-04 ENCOUNTER — TELEPHONE (OUTPATIENT)
Dept: PRIMARY CARE CLINIC | Facility: CLINIC | Age: 59
End: 2024-09-04
Payer: MEDICARE

## 2024-09-04 NOTE — TELEPHONE ENCOUNTER
----- Message from Yaima Pulliam NP sent at 9/3/2024  5:22 PM CDT -----  Please notify patient her Mammogram shows no abnormal masses, will repeat in 1 year for routine breast cancer screening.

## 2024-09-17 ENCOUNTER — CLINICAL SUPPORT (OUTPATIENT)
Dept: SMOKING CESSATION | Facility: CLINIC | Age: 59
End: 2024-09-17
Payer: COMMERCIAL

## 2024-09-17 DIAGNOSIS — F17.200 NICOTINE DEPENDENCE: Primary | ICD-10-CM

## 2024-09-17 NOTE — PROGRESS NOTES
Individual Follow-Up Form    9/17/2024    Quit Date: TBD    Clinical Status of Patient: Outpatient    Length of Service: 60 minutes    Continuing Medication: yes  Chantix     Target Symptoms: Withdrawal and medication side effects. The following were  rated moderate (3) to severe (4) on TCRS:  Moderate (3): None Reported  Severe (4): None Reported    Comments: Spoke with patient at length in clinic regarding tobacco cessation progress. Patient remains on prescribed tobacco cessation medication 1mg Chantix without any negative side effects at this time.  Patient decreased to no more than 2 cigarettes daily.  Patient states she did not smoke cigarettes Sunday.  Patient will make home and vehicle tobacco free.  Reviewed strategies, habitual behavior, stress, and high risk situations. Introduced stress with addition interventions, SOLVE, relaxation with interventions, nutrition, exercise, weight gain, and the importance of rewarding oneself for accomplishments toward becoming tobacco free. Open discussion of all items with interventions.  Counselor discussed the 4ds (delay, drink, distract, deep breathing) to address nicotine cravings.  Counselor also suggested that patient utilize jolly ranchers, dum dum lollipops, and toffee candy to help control nicotine cravings.  Patient appeared to be receptive to the information given.  Counselor will continue to motivate patient to be tobacco free.    Diagnosis: F17.200    Next Visit: 1 week

## 2024-09-24 ENCOUNTER — TELEPHONE (OUTPATIENT)
Dept: SMOKING CESSATION | Facility: CLINIC | Age: 59
End: 2024-09-24
Payer: MEDICARE

## 2024-09-24 NOTE — TELEPHONE ENCOUNTER
Spoke with patient regarding scheduled follow up appointment.  Patient requested to reschedule appointment.  Appointment rescheduled for 10/1/2024 @ 11AM.

## 2024-10-01 ENCOUNTER — CLINICAL SUPPORT (OUTPATIENT)
Dept: SMOKING CESSATION | Facility: CLINIC | Age: 59
End: 2024-10-01
Payer: COMMERCIAL

## 2024-10-01 DIAGNOSIS — F17.200 NICOTINE DEPENDENCE: Primary | ICD-10-CM

## 2024-10-01 PROCEDURE — 99404 PREV MED CNSL INDIV APPRX 60: CPT | Mod: S$PBB,,, | Performed by: GENERAL PRACTICE

## 2024-10-01 NOTE — PROGRESS NOTES
Individual Follow-Up Form    10/1/2024    Quit Date: TBD    Clinical Status of Patient: Outpatient    Length of Service: 60 minutes    Continuing Medication: yes  Chantix     Target Symptoms: Withdrawal and medication side effects. The following were  rated moderate (3) to severe (4) on TCRS:  Moderate (3): None Reported  Severe (4): None Reported    Comments: Spoke with patient at length in clinic regarding tobacco cessation progress. Patient remains on prescribed tobacco cessation medication 1mg Chantix without any negative side effects at this time.  Patient states she smoked 3 cigarettes a few days but 2 most days.  Patient will cut down to 1-2 cigarettes a day this week.  Reviewed strategies, habitual behavior, stress, and high risk situations. Introduced stress with addition interventions, SOLVE, relaxation with interventions, nutrition, exercise, weight gain, and the importance of rewarding oneself for accomplishments toward becoming tobacco free. Open discussion of all items with interventions.  Counselor discussed the 4ds (delay, drink, distract, deep breathing) to address nicotine cravings.  Counselor also suggested that patient utilize jolly ranchers, dum dum lollipops, and toffee candy to help control nicotine cravings.  Patient appeared to be receptive to the information given.  Counselor will continue to motivate patient to be tobacco free.    Diagnosis: F17.200    Next Visit: 1 week

## 2024-10-08 ENCOUNTER — CLINICAL SUPPORT (OUTPATIENT)
Dept: SMOKING CESSATION | Facility: CLINIC | Age: 59
End: 2024-10-08
Payer: COMMERCIAL

## 2024-10-08 DIAGNOSIS — F17.200 NICOTINE DEPENDENCE: Primary | ICD-10-CM

## 2024-10-08 PROCEDURE — 99404 PREV MED CNSL INDIV APPRX 60: CPT | Mod: S$PBB,,, | Performed by: GENERAL PRACTICE

## 2024-10-08 NOTE — PROGRESS NOTES
Individual Follow-Up Form    10/8/2024    Quit Date: TBD    Clinical Status of Patient: Outpatient    Length of Service: 60 minutes    Continuing Medication: yes  Chantix     Target Symptoms: Withdrawal and medication side effects. The following were  rated moderate (3) to severe (4) on TCRS:  Moderate (3): None Reported  Severe (4): None Reported    Comments: Spoke with patient at length in clinic regarding tobacco cessation progress. Patient remains on prescribed tobacco cessation medication 1mg Chantix without any negative side effects at this time.  Patient remains smoking 2 cigarettes per day.  Patient will cut down to 1 cigarette a day this week.  Reviewed strategies, habitual behavior, stress, and high risk situations. Introduced stress with addition interventions, SOLVE, relaxation with interventions, nutrition, exercise, weight gain, and the importance of rewarding oneself for accomplishments toward becoming tobacco free. Open discussion of all items with interventions.   Patient appeared to be receptive to the information given.  Counselor will continue to motivate patient to be tobacco free.    Diagnosis: F17.200    Next Visit: 1 week

## 2024-10-15 ENCOUNTER — CLINICAL SUPPORT (OUTPATIENT)
Dept: SMOKING CESSATION | Facility: CLINIC | Age: 59
End: 2024-10-15
Payer: COMMERCIAL

## 2024-10-15 DIAGNOSIS — F17.200 NICOTINE DEPENDENCE: ICD-10-CM

## 2024-10-15 PROCEDURE — 99404 PREV MED CNSL INDIV APPRX 60: CPT | Mod: S$PBB,,, | Performed by: GENERAL PRACTICE

## 2024-10-15 RX ORDER — VARENICLINE TARTRATE 1 MG/1
1 TABLET, FILM COATED ORAL 2 TIMES DAILY
Qty: 56 TABLET | Refills: 0 | Status: SHIPPED | OUTPATIENT
Start: 2024-10-15

## 2024-10-15 NOTE — PROGRESS NOTES
Individual Follow-Up Form    10/15/2024    Quit Date: TBD    Clinical Status of Patient: Outpatient    Length of Service: 60 minutes    Continuing Medication: yes  Chantix     Target Symptoms: Withdrawal and medication side effects. The following were  rated moderate (3) to severe (4) on TCRS:  Moderate (3): None Reported  Severe (4): None Reported    Comments: Spoke with patient at length in clinic regarding tobacco cessation progress. Patient remains on prescribed tobacco cessation medication 21mg nicotine patch without any negative side effects at this time.  Patient decreased from 10 to 8 cigarettes daily.  Patient will cut down to 2 cigarettes a day this week.  Patient also reports smoking 1 cigarette daily.  Patient states she had one cigarette in her car today.  Patient will cut down to 1 cigarette a day this week.  Reviewed strategies, habitual behavior, stress, and high risk situations. Introduced stress with addition interventions, SOLVE, relaxation with interventions, nutrition, exercise, weight gain, and the importance of rewarding oneself for accomplishments toward becoming tobacco free. Open discussion of all items with interventions.  Counselor submitted refill for Chantix prescription.  Counselor discussed the 4ds (delay, drink, distract, deep breathing) to address nicotine cravings.  Patient appeared to be receptive to the information given.  Counselor will continue to motivate patient to be tobacco free.    Diagnosis: F17.200    Next Visit: 1 week

## 2024-10-22 ENCOUNTER — CLINICAL SUPPORT (OUTPATIENT)
Dept: SMOKING CESSATION | Facility: CLINIC | Age: 59
End: 2024-10-22
Payer: COMMERCIAL

## 2024-10-22 DIAGNOSIS — F17.200 NICOTINE DEPENDENCE: Primary | ICD-10-CM

## 2024-10-22 PROCEDURE — 90853 GROUP PSYCHOTHERAPY: CPT | Mod: ,,, | Performed by: GENERAL PRACTICE

## 2024-10-22 NOTE — PROGRESS NOTES
Individual Follow-Up Form    10/22/2024    Quit Date: TBD    Clinical Status of Patient: Outpatient    Length of Service: 60 minutes    Continuing Medication: yes  Chantix     Target Symptoms: Withdrawal and medication side effects. The following were  rated moderate (3) to severe (4) on TCRS:  Moderate (3): None Reported  Severe (4): None Reported    Comments: Spoke with patient at length in clinic regarding tobacco cessation progress. Patient remains on prescribed tobacco cessation medication 1mg Chantix without any negative side effects at this time.  Patient decreased to 1-2 cigarettes daily.  Patient states she didn't smoke Sunday.   Patient will cut down to 1 cigarette a day this week.  Patient plans to use her daily cigarette during phone calls while talking with her friend.  Reviewed strategies, habitual behavior, high risks situations, understanding urges and cravings, stress and relaxation with open discussion and additional interventions, Introduced lapses, relapses, understanding them and analyzing the situation of a lapse, conflict issues that may be linked to a lapse.  Counselor discussed the 4ds (delay, drink, distract, deep breathing) to address nicotine cravings.  Patient appeared to be receptive to the information given.  Counselor will continue to motivate patient to be tobacco free.    Diagnosis: F17.200    Next Visit: 1 week

## 2024-10-29 ENCOUNTER — TELEPHONE (OUTPATIENT)
Dept: SMOKING CESSATION | Facility: CLINIC | Age: 59
End: 2024-10-29
Payer: MEDICARE

## 2024-10-31 ENCOUNTER — CLINICAL SUPPORT (OUTPATIENT)
Dept: OBSTETRICS AND GYNECOLOGY | Facility: CLINIC | Age: 59
End: 2024-10-31
Payer: MEDICARE

## 2024-10-31 DIAGNOSIS — Z01.89 ROUTINE LAB DRAW: Primary | ICD-10-CM

## 2024-10-31 LAB
ALBUMIN SERPL BCP-MCNC: 3.6 G/DL (ref 3.4–5)
ALBUMIN/GLOBULIN RATIO: 1.1 RATIO (ref 1.1–1.8)
ALP SERPL-CCNC: 114 U/L (ref 45–117)
ALT SERPL W P-5'-P-CCNC: 25 U/L (ref 13–56)
ANION GAP SERPL CALC-SCNC: 8 MMOL/L (ref 3–11)
APPEARANCE, UA: CLEAR
AST SERPL-CCNC: 15 U/L (ref 15–37)
BILIRUB SERPL-MCNC: 0.4 MG/DL (ref 0.2–1)
BILIRUB UR QL STRIP: NEGATIVE MG/DL
BUN SERPL-MCNC: 25 MG/DL (ref 7–18)
BUN/CREAT SERPL: 18.51 RATIO
CALCIUM SERPL-MCNC: 9.4 MG/DL (ref 8.5–10.1)
CHLORIDE SERPL-SCNC: 101 MMOL/L (ref 98–107)
CO2 SERPL-SCNC: 29 MMOL/L (ref 21–32)
COLOR UR: COLORLESS
CREAT SERPL-MCNC: 1.35 MG/DL (ref 0.55–1.02)
GFR ESTIMATION: 46
GLOBULIN: 3.3 G/DL (ref 2.3–3.5)
GLUCOSE (UA): >1000 MG/DL
GLUCOSE SERPL-MCNC: 197 MG/DL (ref 74–106)
HBA1C MFR BLD: 6.5 % (ref 4.2–6.3)
HGB UR QL STRIP: NEGATIVE /UL
KETONES UR QL STRIP: NEGATIVE MG/DL
LEUKOCYTE ESTERASE UR QL STRIP: 25 /UL
NITRITE UR QL STRIP: NEGATIVE
PH UR STRIP: 6 PH (ref 5–8)
POTASSIUM SERPL-SCNC: 4.6 MMOL/L (ref 3.5–5.1)
PROT SERPL-MCNC: 6.9 G/DL (ref 6.4–8.2)
PROT UR QL STRIP: NEGATIVE MG/DL
SODIUM BLD-SCNC: 138 MMOL/L (ref 131–143)
SP GR UR STRIP: 1.01 (ref 1–1.03)
T4 FREE SP9 P CHAL SERPL-SCNC: 1.33 NG/DL (ref 0.76–1.46)
TSH SERPL DL<=0.005 MIU/L-ACNC: 2.13 UIU/ML (ref 0.36–3.74)
UROBILINOGEN UR STRIP-ACNC: NORMAL MG/DL

## 2024-11-04 ENCOUNTER — CLINICAL SUPPORT (OUTPATIENT)
Dept: SMOKING CESSATION | Facility: CLINIC | Age: 59
End: 2024-11-04
Payer: COMMERCIAL

## 2024-11-04 DIAGNOSIS — F17.200 NICOTINE DEPENDENCE: ICD-10-CM

## 2024-11-04 PROCEDURE — 90853 GROUP PSYCHOTHERAPY: CPT | Mod: ,,, | Performed by: GENERAL PRACTICE

## 2024-11-04 RX ORDER — VARENICLINE TARTRATE 1 MG/1
1 TABLET, FILM COATED ORAL 2 TIMES DAILY
Qty: 56 TABLET | Refills: 0 | Status: SHIPPED | OUTPATIENT
Start: 2024-11-04

## 2024-11-04 NOTE — PROGRESS NOTES
Individual Follow-Up Form    11/4/2024    Quit Date: TBD    Clinical Status of Patient: Outpatient    Length of Service: 60 minutes    Continuing Medication: yes  Chantix     Target Symptoms: Withdrawal and medication side effects. The following were  rated moderate (3) to severe (4) on TCRS:  Moderate (3): None Reported  Severe (4): None Reported    Comments: Spoke with patient at length in clinic regarding tobacco cessation progress. Patient remains on prescribed tobacco cessation medication 1mg Chantix without any negative side effects at this time.  Patient states she did not smoke Saturday and Sunday.  Patient smoked one cigarette today.  Patient will make home and vehicle tobacco free.  Reviewed strategies, habitual behavior, high risks situations, understanding urges and cravings, stress and relaxation with open discussion and additional interventions, Introduced lapses, relapses, understanding them and analyzing the situation of a lapse, conflict issues that may be linked to a lapse.  Counselor discussed the 4ds (delay, drink, distract, deep breathing) to address nicotine cravings.  Patient submitted refill for 1mg Chantix.  Patient appeared to be receptive to the information given.  Counselor will continue to motivate patient to be tobacco free.    Diagnosis: F17.200    Next Visit: 1 week

## 2024-11-11 ENCOUNTER — TELEPHONE (OUTPATIENT)
Dept: SMOKING CESSATION | Facility: CLINIC | Age: 59
End: 2024-11-11
Payer: MEDICARE

## 2024-11-11 NOTE — TELEPHONE ENCOUNTER
Spoke with patient regarding scheduled follow up appointment.  Patient states she is ill and requested to reschedule her appointment.  Appointment rescheduled for 11/18/2024 @ 12pm.

## 2024-11-18 ENCOUNTER — OFFICE VISIT (OUTPATIENT)
Dept: PRIMARY CARE CLINIC | Facility: CLINIC | Age: 59
End: 2024-11-18
Payer: MEDICARE

## 2024-11-18 ENCOUNTER — CLINICAL SUPPORT (OUTPATIENT)
Dept: SMOKING CESSATION | Facility: CLINIC | Age: 59
End: 2024-11-18
Payer: COMMERCIAL

## 2024-11-18 VITALS
RESPIRATION RATE: 18 BRPM | BODY MASS INDEX: 43.35 KG/M2 | HEIGHT: 65 IN | HEART RATE: 92 BPM | DIASTOLIC BLOOD PRESSURE: 82 MMHG | OXYGEN SATURATION: 92 % | SYSTOLIC BLOOD PRESSURE: 137 MMHG | TEMPERATURE: 98 F | WEIGHT: 260.19 LBS

## 2024-11-18 DIAGNOSIS — E66.01 MORBID OBESITY WITH BMI OF 40.0-44.9, ADULT: ICD-10-CM

## 2024-11-18 DIAGNOSIS — R06.02 SOB (SHORTNESS OF BREATH): ICD-10-CM

## 2024-11-18 DIAGNOSIS — N18.31 CHRONIC KIDNEY DISEASE, STAGE 3A: ICD-10-CM

## 2024-11-18 DIAGNOSIS — J43.8 OTHER EMPHYSEMA: ICD-10-CM

## 2024-11-18 DIAGNOSIS — M54.89 OTHER CHRONIC BACK PAIN: ICD-10-CM

## 2024-11-18 DIAGNOSIS — E11.49 TYPE 2 DIABETES MELLITUS WITH OTHER NEUROLOGIC COMPLICATION, WITH LONG-TERM CURRENT USE OF INSULIN: ICD-10-CM

## 2024-11-18 DIAGNOSIS — G89.29 OTHER CHRONIC BACK PAIN: ICD-10-CM

## 2024-11-18 DIAGNOSIS — F41.9 ANXIETY AND DEPRESSION: ICD-10-CM

## 2024-11-18 DIAGNOSIS — Z79.4 TYPE 2 DIABETES MELLITUS WITH OTHER NEUROLOGIC COMPLICATION, WITH LONG-TERM CURRENT USE OF INSULIN: ICD-10-CM

## 2024-11-18 DIAGNOSIS — E78.2 MIXED HYPERLIPIDEMIA: ICD-10-CM

## 2024-11-18 DIAGNOSIS — R05.1 ACUTE COUGH: ICD-10-CM

## 2024-11-18 DIAGNOSIS — F17.200 NICOTINE DEPENDENCE: Primary | ICD-10-CM

## 2024-11-18 DIAGNOSIS — Z79.4 TYPE 2 DIABETES MELLITUS WITH DIABETIC POLYNEUROPATHY, WITH LONG-TERM CURRENT USE OF INSULIN: ICD-10-CM

## 2024-11-18 DIAGNOSIS — R68.89 FLU-LIKE SYMPTOMS: ICD-10-CM

## 2024-11-18 DIAGNOSIS — R06.2 WHEEZING: ICD-10-CM

## 2024-11-18 DIAGNOSIS — F32.A ANXIETY AND DEPRESSION: ICD-10-CM

## 2024-11-18 DIAGNOSIS — E11.42 TYPE 2 DIABETES MELLITUS WITH DIABETIC POLYNEUROPATHY, WITH LONG-TERM CURRENT USE OF INSULIN: ICD-10-CM

## 2024-11-18 DIAGNOSIS — I10 BENIGN ESSENTIAL HTN: Primary | ICD-10-CM

## 2024-11-18 DIAGNOSIS — K21.00 GASTROESOPHAGEAL REFLUX DISEASE WITH ESOPHAGITIS WITHOUT HEMORRHAGE: ICD-10-CM

## 2024-11-18 DIAGNOSIS — J06.9 ACUTE UPPER RESPIRATORY INFECTION: ICD-10-CM

## 2024-11-18 LAB
CTP QC/QA: YES
CTP QC/QA: YES
S PYO RRNA THROAT QL PROBE: NEGATIVE
SARS-COV-2 AG RESP QL IA.RAPID: NEGATIVE

## 2024-11-18 PROCEDURE — 3079F DIAST BP 80-89 MM HG: CPT | Mod: CPTII,,, | Performed by: NURSE PRACTITIONER

## 2024-11-18 PROCEDURE — 3008F BODY MASS INDEX DOCD: CPT | Mod: CPTII,,, | Performed by: NURSE PRACTITIONER

## 2024-11-18 PROCEDURE — 99214 OFFICE O/P EST MOD 30 MIN: CPT | Mod: S$PBB,,, | Performed by: NURSE PRACTITIONER

## 2024-11-18 PROCEDURE — 3075F SYST BP GE 130 - 139MM HG: CPT | Mod: CPTII,,, | Performed by: NURSE PRACTITIONER

## 2024-11-18 PROCEDURE — 3060F POS MICROALBUMINURIA REV: CPT | Mod: CPTII,,, | Performed by: NURSE PRACTITIONER

## 2024-11-18 PROCEDURE — 4010F ACE/ARB THERAPY RXD/TAKEN: CPT | Mod: CPTII,,, | Performed by: NURSE PRACTITIONER

## 2024-11-18 PROCEDURE — 3066F NEPHROPATHY DOC TX: CPT | Mod: CPTII,,, | Performed by: NURSE PRACTITIONER

## 2024-11-18 PROCEDURE — 3044F HG A1C LEVEL LT 7.0%: CPT | Mod: CPTII,,, | Performed by: NURSE PRACTITIONER

## 2024-11-18 PROCEDURE — 1160F RVW MEDS BY RX/DR IN RCRD: CPT | Mod: CPTII,,, | Performed by: NURSE PRACTITIONER

## 2024-11-18 PROCEDURE — 1159F MED LIST DOCD IN RCRD: CPT | Mod: CPTII,,, | Performed by: NURSE PRACTITIONER

## 2024-11-18 RX ORDER — ALBUTEROL SULFATE 90 UG/1
INHALANT RESPIRATORY (INHALATION)
Qty: 18 G | Refills: 2 | Status: SHIPPED | OUTPATIENT
Start: 2024-11-18

## 2024-11-18 RX ORDER — PHENTERMINE HYDROCHLORIDE 8 MG/1
1 TABLET ORAL
COMMUNITY
Start: 2024-11-01

## 2024-11-18 RX ORDER — BENZONATATE 100 MG/1
100 CAPSULE ORAL 3 TIMES DAILY PRN
Qty: 30 CAPSULE | Refills: 0 | Status: SHIPPED | OUTPATIENT
Start: 2024-11-18

## 2024-11-18 RX ORDER — AMOXICILLIN 500 MG/1
500 TABLET, FILM COATED ORAL EVERY 12 HOURS
Qty: 14 TABLET | Refills: 0 | Status: SHIPPED | OUTPATIENT
Start: 2024-11-18 | End: 2024-11-25

## 2024-11-18 RX ORDER — LEVOTHYROXINE SODIUM 125 UG/1
TABLET ORAL
COMMUNITY
Start: 2024-11-04

## 2024-11-18 NOTE — PATIENT INSTRUCTIONS
RTC in 3 months for F/U or sooner if needed.    Patient Education       Bacterial Upper Respiratory Infection, Adult   About this topic   Germs cause this health problem. You have signs in your nose, windpipe, voice box or larynx, throat, ears, or lungs that last for days or weeks. It often spreads from a person who is sick to some other person from close contact. This health problem may include:  Sore throat. This is pharyngitis.  Swelling of the lining of the nose. This is rhinitis.  Swelling of the sinuses with pain in the face, forehead, or upper teeth. This is sinusitis.  Swelling of the nose and throat. This is nasopharyngitis.  Swelling of the upper part of the voice box. This is epiglottitis.  Swelling of the voice box. This is laryngitis.  Cough  What are the causes?   The main cause is an infection by certain germs.  What can make this more likely to happen?   Cold or winter season  Low immune system  Close contact with someone who has an upper respiratory infection  Allergies or asthma  Working in a school or day care center  What are the main signs?   Cough or sneezing  Sore throat  Runny or stuffy nose  Ear pain  Fever  Headache  Muscle pain  Tired  Weakness  How does the doctor diagnose this health problem?   Your doctor will do an exam and ask about your history. Your doctor will check your nose, throat, and lungs. The doctor may order:  Lab tests  Throat swab  Chest x-ray  CT or MRI scan  How does the doctor treat this health problem?   Your doctor may give you drugs to treat or prevent infection. You may also be given other drugs based on your condition. Talk to your doctor about what drugs you need to take.  What lifestyle changes are needed?   You need to rest while you are getting better. If your throat is sore, don't talk too much. This will rest your voice and throat. Drink plenty of fluids to stay hydrated.  What drugs may be needed?   The doctor may order drugs to:  Help with pain and  swelling  Fight an infection  Dry up a stuffy nose  Stop wheezing  Control coughing  What can be done to prevent this health problem?   Wash your hands often with soap and water for at least 20 seconds, especially after coughing or sneezing. Alcohol-based hand sanitizers also work to kill the virus.  If you are sick, cover your mouth and nose with tissue when you cough or sneeze. You can also cough into your elbow. Throw away tissues in the trash and wash your hands after touching used tissues.  Clean commonly handled things like door handles, remotes, toys, and phones. Wipe them with a disinfectant.  Do not get too close (kissing, hugging) to people who are sick.  Do not share food, drinks, towels, or hankies with anyone who is sick.  Stay away from crowded places.  Where can I learn more?   American Academy of Family Physicians  https://familydoctor.org/antibiotic-resistance/   American Academy of Family Physicians  https://familydoctor.org/condition/colds-and-the-flu/   Centers for Disease Control and Prevention  http://www.cdc.gov/getsmart/antibiotic-use/URI/index.html   NHS Choices  http://www.nhs.uk/conditions/Respiratory-tract-infection/Pages/Introduction.aspx   Last Reviewed Date   2020-01-24  Consumer Information Use and Disclaimer   This information is not specific medical advice and does not replace information you receive from your health care provider. This is only a brief summary of general information. It does NOT include all information about conditions, illnesses, injuries, tests, procedures, treatments, therapies, discharge instructions or life-style choices that may apply to you. You must talk with your health care provider for complete information about your health and treatment options. This information should not be used to decide whether or not to accept your health care providers advice, instructions or recommendations. Only your health care provider has the knowledge and training to provide  "advice that is right for you.  Copyright   Copyright © 2021 UpToDate, Inc. and its affiliates and/or licensors. All rights reserved.    Patient Education       Controlling Your Blood Pressure Through Lifestyle   The Basics   Written by the doctors and editors at [employer]   What does my lifestyle have to do with my blood pressure? -- The things you do and the foods you eat have a big effect on your blood pressure and your overall health. Following the right lifestyle can:  Lower your blood pressure or keep you from getting high blood pressure in the first place  Reduce your need for blood pressure medicines  Make medicines for high blood pressure work better, if you do take them  Lower the chances that you'll have a heart attack or stroke, or develop kidney disease  Which lifestyle choices will help lower my blood pressure? -- Here's what you can do:  Lose weight (if you are overweight)  Choose a diet rich in fruits, vegetables, and low-fat dairy products, and low in meats, sweets, and refined grains  Eat less salt (sodium)  Do something active for at least 30 minutes a day on most days of the week  Limit the amount of alcohol you drink  If you have high blood pressure, it's also very important to quit smoking (if you smoke). Quitting smoking might not bring your blood pressure down. But it will lower the chances that you'll have a heart attack or stroke, and it will help you feel better and live longer.  Start low and go slow -- The changes listed above might sound like a lot, but don't worry. You don't have to change everything all at once. The key to improving your lifestyle is to "start low and go slow." Choose 1 small, specific thing to change and try doing it for a while. If it works for you, keep doing it until it becomes a habit. If it doesn't, don't give up. Choose something else to change and see how that goes.  Let's say, for example, that you would like to improve your diet. If you're the type of person " "who eats cheeseburgers and French fries all the time, you can't switch to eating just salads from one day to the next. When people try to make changes like that, they often fail. Then they feel frustrated and tend to give up. So instead of trying to change everything about your diet in 1 day, change 1 or 2 small things about your diet and give yourself time to get used to those changes. For instance, keep the cheeseburger but give up the French fries. Or eat the same things but cut your portions in half.  As you find things that you are able to change and stick with, keep adding new changes. In time, you will see that you can actually change a lot. You just have to get used to the changes slowly.  Lose weight -- When people think about losing weight, they sometimes make it more complicated than it really is. To lose weight, you have to either eat less or move more. If you do both of those things, it's even better. But there is no single weight-loss diet or activity that's better than any other. When it comes to weight loss, the most effective plan is the one that you'll stick with.  Improve your diet -- There is no single diet that is right for everyone. But in general, a healthy diet can include:  Lots of fruits, vegetables, and whole grains  Some beans, peas, lentils, chickpeas, and similar foods  Some nuts, such as walnuts, almonds, and peanuts  Fat-free or low-fat milk and milk products  Some fish  To have a healthy diet, it's also important to limit or avoid sugar, sweets, meats, and refined grains. (Refined grains are found in white bread, white rice, most forms of pasta, and most packaged "snack" foods.)  Reduce salt -- Many people think that eating a low-sodium diet means avoiding the salt shaker and not adding salt when cooking. The truth is, not adding salt at the table or when you cook will only help a little. Almost all of the sodium you eat is already in the food you buy at the grocery store or at " "restaurants (figure 1).  The most important thing you can do to cut down on sodium is to eat less processed food. That means that you should avoid most foods that are sold in cans, boxes, jars, and bags. You should also eat in restaurants less often.  To reduce the amount of sodium you get, buy fresh or fresh-frozen fruits, vegetables, and meats. (Fresh-frozen foods have had nothing added to them before freezing.) Then you can make meals at home, from scratch, with these ingredients.  As with the other changes, don't try to cut out salt all at once. Instead, choose 1 or 2 foods that have a lot of sodium and try to replace them with low-sodium choices. When you get used to those low-sodium options, find another food or 2 to change. Then keep going, until all the foods you eat are sodium-free or low in sodium.  Become more active -- If you want to be more active, you don't have to go to the gym or get all sweaty. It is possible to increase your activity level while doing everyday things you enjoy. Walking, gardening, and dancing are just a few of the things that you might try. As with all the other changes, the key is not to do too much too fast. If you don't do any activity now, start by walking for just a few minutes every other day. Do that for a few weeks. If you stick with it, try doing it for longer. But if you find that you don't like walking, try a different activity.  Drink less alcohol -- If you are a woman, do not have more than 1 "standard drink" of alcohol a day. If you are a man, do not have more than 2. A "standard drink" is:  A can or bottle that has 12 ounces of beer  A glass that has 5 ounces of wine  A shot that has 1.5 ounces of whiskey  Where should I start? -- If you want to improve your lifestyle, start by making the changes that you think would be easiest for you. If you used to exercise and just got out of the habit, maybe it would be easy for you to start exercising again. Or if you actually " "like cooking meals from scratch, maybe the first thing you should focus on is eating home-cooked meals that are low in sodium.  Whatever you tackle first, choose specific, realistic goals, and give yourself a deadline. For example, do not decide that you are going to "exercise more." Instead, decide that you are going to walk for 10 minutes on Monday, Wednesday, and Friday, and that you are going to do this for the next 2 weeks.  When lifestyle changes are too general, people have a hard time following through.  Now go. You can do it!  All topics are updated as new evidence becomes available and our peer review process is complete.  This topic retrieved from [employer] on: Sep 21, 2021.  Topic 88381 Version 8.0  Release: 29.4.2 - C29.263  © 2021 UpToDate, Inc. and/or its affiliates. All rights reserved.  figure 1: Sources of sodium in your diet     Graphic 23616 Version 2.0    Consumer Information Use and Disclaimer   This information is not specific medical advice and does not replace information you receive from your health care provider. This is only a brief summary of general information. It does NOT include all information about conditions, illnesses, injuries, tests, procedures, treatments, therapies, discharge instructions or life-style choices that may apply to you. You must talk with your health care provider for complete information about your health and treatment options. This information should not be used to decide whether or not to accept your health care provider's advice, instructions or recommendations. Only your health care provider has the knowledge and training to provide advice that is right for you. The use of this information is governed by the [employer] End User License Agreement, available at https://www.[employer].Eu[employer]ing/en/solutions/[employer]/about/bhargav.The use of [employer] content is governed by the [employer] Terms of Use. ©2021 UpToDate, Inc. All rights reserved.  Copyright   © 2021 [employer], " "Inc. and/or its affiliates. All rights reserved.    Patient Education       Diabetes and Diet   The Basics   Written by the doctors and editors at [facility]   Why is diet important in diabetes? -- Diet is important because it is part of diabetes treatment. Many people need to change what they eat and how much they eat to help treat their diabetes. It is important for people to treat their diabetes so that they:  Keep their blood sugar at or near a normal level  Prevent long-term problems, such as heart or kidney problems, that can happen in people with diabetes  Changing your diet can also help treat obesity, high blood pressure, and high cholesterol. These conditions can affect people with diabetes and can lead to future problems, such as heart attacks or strokes.  Who will work with me to change my diet? -- Your doctor or nurse will work with you to make a food plan to change your diet. They might also recommend that you work with a "dietitian." A dietitian is an expert on food and eating.  Do I need to eat at the same times every day? -- When and how often you should eat depends, in part, on the diabetes medicines you take. For example:  People who take about the same amount of insulin at the same time each day (called a "fixed regimen") should eat meals at the same times. This is also true for people who take pills that increase insulin levels, such as sulfonylureas. Eating meals at the same time every day helps prevent low blood sugar.  People who adjust the dose and timing of their insulin each day (called a "flexible regimen") do not always have to eat meals at the same time. That's because they can time their insulin dose for before they plan to eat, and also adjust the dose for how much they plan to eat.  People who take medicines that don't usually cause low blood sugar, such as metformin, don't have to eat meals at the same time every day.  What do I need to think about when planning what to eat? -- Our " "bodies break down the food we eat into small pieces called carbohydrates, proteins, and fats.  When planning what to eat, people with diabetes need to think about:  Carbohydrates (or "carbs") - Carbohydrates, which are sugars that our bodies use for energy, can raise a person's blood sugar level. Your doctor, nurse, or dietitian will tell you how many carbohydrates you should eat at each meal or snack. Foods that have carbohydrates include:  Bread, pasta, and rice  Vegetables and fruits  Dairy foods  Foods and drinks with added sugar  It is best to get your carbohydrates from fruits, vegetables, whole grains, and low-fat milk. It is best to avoid drinks with added sugar, like soda, juices, and sports drinks.   Protein - Your doctor, nurse, or dietitian will tell you how much protein you should eat each day. It is best to eat lean meats, fish, eggs, beans, peas, soy products, nuts, and seeds.  Fats - The type of fat you eat is more important than the amount of fat. "Saturated" and "trans" fats can increase your risk for heart problems, like a heart attack.  Foods that have saturated fats include meat, butter, cheese, and ice cream.  Foods that have trans fats include processed food with "partially hydrogenated oils" on the ingredient list. This may include fried foods, store bought cookies, muffins, pies, and cakes.  "Monounsaturated" and "polyunsaturated" fats are better for you. Foods with these types of fat include fish, avocado, olive oil, and nuts.  Calories - People need to eat a certain amount of calories each day to keep their weight the same. People who are overweight and want to lose weight need to eat fewer calories each day.  Fiber - Eating foods with a lot of fiber can help control a person's blood sugar level. Foods that have a lot of fiber include apples, green beans, peas, beans, lentils, nuts, oatmeal, and whole grains.  Salt - People who have high blood pressure should not eat foods that contain a " lot of salt (also called sodium). People with high blood pressure should also eat healthy foods, such as fruits, vegetables, and low-fat dairy foods.  Alcohol - Having more than 1 drink (for women) or 2 drinks (for men) a day can raise blood sugar levels. Also, drinks that have fruit juice or soda in them can raise blood sugar levels.  What can I do if I need to lose weight? -- If you need to lose weight, you can:  Exercise - Try to get at least 30 minutes of physical activity a day, most days of the week. Even gentle exercise, like walking, is good for your health. Some people with diabetes need to change their medicine dose before they exercise. They might also need to check their blood sugar levels before and after exercising.  Eat fewer calories - Your doctor, nurse, or dietitian can tell you how many calories you should eat each day in order to lose weight.  If you are worried about your weight, size, or shape, talk with your doctor, nurse, or dietitian. They can help you make changes to improve your health.  Can I eat the same foods as my family? -- Yes. You do not need to eat special foods if you have diabetes. You and your family can eat the same foods. Changing your diet is mostly about eating healthy foods and not eating too much.  What are the other parts of diabetes treatment? -- Besides changing your diet, the other parts of diabetes treatment are:  Exercise  Medicines  Some people with diabetes need to learn how to match their diet and exercise with their medicine dose. For example, people who use insulin might need to choose the dose of insulin they give themselves. To choose their dose, they need to think about:  What they plan to eat at the next meal  How much exercise they plan to do  What their blood sugar level is  If the diet and exercise do not match the medicine dose, a person's blood sugar level can get too low or too high. Blood sugar levels that are too low or too high can cause  problems.  All topics are updated as new evidence becomes available and our peer review process is complete.  This topic retrieved from [employer] on: Sep 21, 2021.  Topic 98656 Version 7.0  Release: 29.4.2 - C29.263  © 2021 UpToDate, Inc. and/or its affiliates. All rights reserved.  Consumer Information Use and Disclaimer   This information is not specific medical advice and does not replace information you receive from your health care provider. This is only a brief summary of general information. It does NOT include all information about conditions, illnesses, injuries, tests, procedures, treatments, therapies, discharge instructions or life-style choices that may apply to you. You must talk with your health care provider for complete information about your health and treatment options. This information should not be used to decide whether or not to accept your health care provider's advice, instructions or recommendations. Only your health care provider has the knowledge and training to provide advice that is right for you. The use of this information is governed by the "[employer] End User License Agreement, available at https://www.[employer]/en/solutions/[employer]/about/bhargav.The use of [employer] content is governed by the [employer] Terms of Use. ©2021 UpToDate, Inc. All rights reserved.  Copyright   © 2021 UpToDate, Inc. and/or its affiliates. All rights reserved.    Patient Education       Low Cholesterol, Saturated Fat, and Trans Fat Diet   About this topic   Cholesterol, saturated fat, and trans fat are in many foods. These may raise your blood cholesterol levels. If your cholesterol is too high, this can cause health problems in your heart, liver, kidneys, and even your eyes. The key to lowering your risk of heart problems is to lower your bad fat intake.  Saturated fats and trans fats are the bad fats. These fats clog your arteries and raise your bad cholesterol. Saturated fats and trans fats are solid  "fats at room temperature. Saturated fats are animal fats. Trans fats are manmade fats. They add flavor to a lot of packaged foods. Staying away from saturated and trans fats will help your heart.  When you do eat foods with fat, make sure they have the good fats. Monounsaturated and polyunsaturated fats are good fats. These fats help raise your good cholesterol and protect your heart.  General   How to Lower Fat and Cholesterol in Your Diet   Read the labels of the foods you buy from the market to find out how much fat is present. Under 5% of total fat on a label means it is "low fat". Over 20% of total fat on a label means it is high fat.  Eat high fiber foods, like soluble fiber. This type of fiber helps lower cholesterol in the body. Choose oatmeal, fruits (like apples), beans, and nuts to get the most soluble fiber.  Eat foods high in omega-3 fatty acids like gopal seeds, walnuts, salmon, tuna, trout, herring, flaxseed, and soybeans. These foods help keep the heart healthy.  Limit your bad fat and oil intake.  Stay away from butter, stick margarine, shortening, lard, and palm and coconut oil. Pick plant-based spreads instead.  Limit mayonnaise, salad dressings, gravies, and sauces, unless it is made from low-fat ingredients.  Limit chocolate.  Do not eat high-fat processed foods like hot dogs, feliciano, sausage, ham and other luncheon meats high in fat, and some frozen foods. Pick fish, chicken, turkey, and lean meats instead.  Eat more dried beans, lentils, and tofu to get your protein.  Do not eat organ meats, like liver.  Choose nonfat or low-fat milk, yogurt, and cheese.  Use light or fat-free cream cheese and sour cream.  Eat lots of fruits and vegetables.  Pick whole grain breads, cereals, pastas, and rice.  Do not eat snacks that are high in fats like granola, cookies, pies, pastries, doughnuts, and croissants.  Stay away from deep fried foods.  Help When Cooking   Remove the fat portion of meats and the " skin from poultry before cooking.  Bake, broil, grill, poach, or roast poultry, fish, and lean meats.  Drain and throw away the fat that drains out of meat as you cook it.  Try to add little or no fat to foods.  Use olive or canola oil for cooking or baking.  Steam your vegetables.  Use herbs or no-oil marinades to flavor foods.         Who should use this diet?   This diet is for people who are at high risk of getting health problems like heart disease, high blood pressure, diabetes, and others. This diet is also good for all people to follow to keep your heart healthy.  What foods are good to eat?   Foods with good fats are:  Canola, peanut, and olive oil  Safflower, soybean, and corn oil  Walnuts, almonds, cashews, and peanuts  Pumpkin and sunflower seeds  [place] and tuna  Tofu  Soymilk  Avocado  What foods should be limited or avoided?   Stay away from these types of foods that have saturated fats:  Whole fat dairy products like cheese, ice cream, whole milk, and cream  Palm and coconut oils  High-fat meats like beef, lamb, poultry with the skin, feliciano, and sausage  Butter and lard  Stay away from these types of foods that may have trans fat:  Cookies, cakes, candy, doughnuts, baked goods, muffins, pizza dough, and pie crusts that are packaged  Fried foods  Frozen dinners  Chips and crackers  Microwave popcorn  Stick margarine and vegetable shortenings  Helpful tips   To help stay away from saturated fat:  Pick lean cuts of meat  Take the skin off chicken and turkey or pick skinless  Pick low-fat cheese, milk, and ice cream  Use liquid oils when cooking and baking, such as olive oil and canola oil  To help stay away from trans fat:  Look at your labels. Choose foods with 0% trans fat. Read the ingredient list. Avoid foods with partially hydrogenated oil in the ingredient list. This means there is trans fat in the product.  Where can I learn more?   American Heart Association    http://www.heart.org/HEARTORG/Conditions/Cholesterol/PreventionTreatmentofHighCholesterol/Know-Your-Fats_[facility]_305628_Article.jsp   Last Reviewed Date   2021-10-05  Consumer Information Use and Disclaimer   This information is not specific medical advice and does not replace information you receive from your health care provider. This is only a brief summary of general information. It does NOT include all information about conditions, illnesses, injuries, tests, procedures, treatments, therapies, discharge instructions or life-style choices that may apply to you. You must talk with your health care provider for complete information about your health and treatment options. This information should not be used to decide whether or not to accept your health care providers advice, instructions or recommendations. Only your health care provider has the knowledge and training to provide advice that is right for you.   Copyright   Copyright © 2021 [employer] and its affiliates and/or licensors. All rights reserved.

## 2024-11-18 NOTE — PROGRESS NOTES
Subjective:       Patient ID: Anca Grace is a 59 y.o. female.    Chief Complaint: Follow-up (Pt c/o cough, congestion, body aches SOB x 7 days. )    HPI: Anca is a 59 y.o. female who presents for F/U.    Patient c/o cough with greenish sputum, sore throat, nasal and chest congestion, body aches and fatige for 1 week with no relief from OTC cold medication. COVID and STREP negative today when checked during visit.    Labs recently drawn, A1C decreased to 6.5, kidney function decreased to 48. All other lab results within normal range.    She was seen by Walt David NP with Endocrinology on 11/01/2024 and he increased the levothyroxine to 125 mcg daily and he also started her on Lomaira 8 mg TID with meals to help with weight loss. She is also followed by Endocrinology for her Diabetes.    Ms. Grace is now seeing Dr. Sellers for chronic bilateral knee arthritis and she plans to have knee surgery soon but she has been referred to Dr. Luna for cardiac evaluation prior to her having surgery and to check her cirulation due to her having issues with leg pain and ulcers in the past. She has had several tests done by Dr. Luna and she will be having an arteriogram on 05/02/024 then will meet with him to discuss results on 05/09/24 with Dr. Luna.     DM II -  Chronic and uncontrolled on meds. Followed by Walt David for Endocrinology for her DM Type 2 and Hypothyroidism. She still takes Toujeo 120 units daily, Mounjaro 15 mg weekly and takes Xigduo daily. She has Neuropathy which she takes Lyrica for it and it helps. [facility] and [facility] was well. She also has diabetic foot exams annually by Dr. Infante in [place].    Hyperlipidemia - controlled with a STATIN. Denies se/ar. Say she has been trying to eat healthier.    Hypothyroidism - levothyroxine increased to 125 mcg daily by Endocrinology at her last visit. Denies se/ar to medication.    Ms. Grace suffers  with pain to multiple joints and her back and neck which is chronic. Follows up monthly for muscle relaxer injections. Elavil increased to 50 mg daily, flexeril, cymbalta and still on Lyrica as well. Also followed by Dr. Gallegos for lower back pain and he does the back injections.    Now seeing Dr. Sellers for chronic bilateral knee arthritis which she had a MRI of her left knee so she will have a F/U appt to discuss possible left knee surgery in the future.    Current smoker, she has cut down to smoking 1-2 cigarettes daily after smoking 1 ppd and has smoked for 15 years. She is still doing smoking cessation program which she is using Chantix and it is helping.    Mammogram UTD. Hysterectomy done in 2012. Takes estradiol daily.    Had a Colonoscopy done at [facility] by Dr. Valencia in 2018 with 3 polyps found and removed, told to repeat in 3 years so she had it done in June 2023 with one polyp found but due to her not being fully cleaned out, he will repeat in 1 year, she will call and schedule an appt.      UTD with COVID and FLU vaccines.                Past Medical History:   Diagnosis Date    Diabetes mellitus, type 2     GERD (gastroesophageal reflux disease)     Hyperlipidemia     Hypertension        Past Surgical History:   Procedure Laterality Date    CARPAL TUNNEL RELEASE      HYSTERECTOMY         Family History   Problem Relation Name Age of Onset    Thyroiditis Mother      Heart disease Father      Graves' disease Sister      Heart disease Maternal Grandfather      Cancer Paternal Grandfather Lico Verdin        Social History     Tobacco Use    Smoking status: Every Day     Current packs/day: 0.50     Average packs/day: 0.5 packs/day for 15.0 years (7.5 ttl pk-yrs)     Types: Cigarettes    Smokeless tobacco: Never   Substance Use Topics    Alcohol use: Never    Drug use: Never       Patient Active Problem List   Diagnosis    Degenerative disc disease, cervical    Degenerative disc disease, lumbar    Neck  pain    Bilateral arm pain    Low back pain radiating to right leg    Morbid obesity with BMI of 40.0-44.9, adult    Hyperlipidemia    Benign essential HTN    Type 2 diabetes mellitus with neurologic complication, with long-term current use of insulin    Gastroesophageal reflux disease with esophagitis without hemorrhage    Other emphysema    Chronic kidney disease, stage 3a       Immunization History   Administered Date(s) Administered    COVID-19, MRNA, LN-S, PF (MODERNA FULL 0.5 ML DOSE) 10/30/2021, 11/27/2021    DTaP 1966, 01/18/1967, 03/08/1967, 04/10/1968, 03/03/1971, 08/01/1977    IPV 1966, 01/18/1967, 03/08/1967, 04/10/1968, 04/07/1978    Influenza - Quadrivalent - PF *Preferred* (6 months and older) 10/14/2021    Measles 11/07/1967    Mumps 11/13/1968    Rubella 10/21/1970    Tdap 04/13/1988, 08/06/2004           Review of Systems   Constitutional:  Negative for activity change, appetite change, chills, diaphoresis, fatigue and fever.   HENT:  Positive for congestion, postnasal drip, rhinorrhea, sinus pressure, sinus pain and sore throat. Negative for ear pain, tinnitus and trouble swallowing.    Eyes:  Negative for pain, discharge, redness, itching and visual disturbance.   Respiratory:  Positive for cough. Negative for chest tightness, shortness of breath and wheezing.    Cardiovascular:  Negative for chest pain, palpitations and leg swelling.   Gastrointestinal:  Negative for abdominal distention, abdominal pain, blood in stool, constipation, diarrhea, nausea and vomiting.   Endocrine: Negative for cold intolerance, heat intolerance, polydipsia, polyphagia and polyuria.   Genitourinary:  Negative for decreased urine volume, dysuria, frequency, hematuria and urgency.   Musculoskeletal:  Positive for arthralgias (multiple joints), back pain and myalgias. Negative for gait problem and neck pain.   Skin:  Negative for color change and rash.   Allergic/Immunologic: Positive for environmental  "allergies.   Neurological:  Negative for dizziness, syncope, weakness, light-headedness, numbness and headaches.   Psychiatric/Behavioral:  Negative for behavioral problems, confusion and dysphoric mood. The patient is not nervous/anxious.      Objective:     Vitals:    11/18/24 1027   BP: 137/82   BP Location: Right arm   Patient Position: Sitting   Pulse: 92   Resp: 18   Temp: 98.1 °F (36.7 °C)   TempSrc: Oral   SpO2: (!) 92%   Weight: 118 kg (260 lb 3.2 oz)   Height: 5' 5" (1.651 m)       Physical Exam  Vitals and nursing note reviewed.   Constitutional:       General: She is not in acute distress.     Appearance: Normal appearance. She is not diaphoretic.   HENT:      Head: Normocephalic and atraumatic.      Nose: Congestion and rhinorrhea present.      Right Sinus: Frontal sinus tenderness present. No maxillary sinus tenderness.      Left Sinus: Frontal sinus tenderness present. No maxillary sinus tenderness.      Mouth/Throat:      Mouth: Mucous membranes are moist.      Pharynx: Oropharynx is clear. Posterior oropharyngeal erythema present. No oropharyngeal exudate.   Eyes:      General:         Right eye: No discharge.         Left eye: No discharge.      Extraocular Movements: Extraocular movements intact.      Conjunctiva/sclera: Conjunctivae normal.      Pupils: Pupils are equal, round, and reactive to light.   Cardiovascular:      Rate and Rhythm: Normal rate and regular rhythm.      Pulses: Normal pulses.      Heart sounds: Normal heart sounds.   Pulmonary:      Effort: Pulmonary effort is normal. No respiratory distress.      Breath sounds: Normal breath sounds. No stridor. No wheezing, rhonchi or rales.   Chest:      Chest wall: No tenderness.   Abdominal:      General: Bowel sounds are normal. There is no distension.      Palpations: Abdomen is soft.      Tenderness: There is no abdominal tenderness. There is no guarding.   Musculoskeletal:         General: Tenderness (multiple joints) present. " Normal range of motion.      Cervical back: Normal range of motion.      Right lower leg: No edema.      Left lower leg: No edema.   Lymphadenopathy:      Cervical: No cervical adenopathy.   Skin:     General: Skin is warm and dry.      Capillary Refill: Capillary refill takes less than 2 seconds.      Findings: No rash.   Neurological:      Mental Status: She is alert and oriented to person, place, and time.   Psychiatric:         Mood and Affect: Mood and affect normal.         Behavior: Behavior normal. Behavior is cooperative.         Office Visit on 11/18/2024   Component Date Value Ref Range Status    SARS Coronavirus 2 Antigen 11/18/2024 Negative  Negative Final     Acceptable 11/18/2024 Yes   Final    Rapid Strep A Screen 11/18/2024 Negative  Negative Final     Acceptable 11/18/2024 Yes   Final   Orders Only on 10/31/2024   Component Date Value Ref Range Status    Hemoglobin A1C 10/31/2024 6.5 (H)  4.2 - 6.3 % Final   Orders Only on 10/31/2024   Component Date Value Ref Range Status    Color, UA 10/31/2024 Colorless  Yellow Final    Appearance, UA 10/31/2024 Clear  Clear Final    pH, UA 10/31/2024 6.0  5.0 - 8.0 pH Final    Specific Gravity, UA 10/31/2024 1.007  1.005 - 1.030 Final    Protein, UA 10/31/2024 Negative  Negative mg/dL Final    Glucose, UA 10/31/2024 >1000  Normal mg/dL Final    Ketones, UA 10/31/2024 Negative  Negative mg/dL Final    Occult Blood UA 10/31/2024 Negative  Negative /uL Final    Nitrite, UA 10/31/2024 Negative  Negative Final    Bilirubin (UA) 10/31/2024 Negative  Negative mg/dL Final    Urobilinogen, UA 10/31/2024 Normal  Normal mg/dL Final    Leukocytes, UA 10/31/2024 25 (A)  Negative /uL Final   Orders Only on 10/31/2024   Component Date Value Ref Range Status    Sodium 10/31/2024 138  131 - 143 mmol/L Final    Potassium 10/31/2024 4.6  3.5 - 5.1 mmol/L Final    Chloride 10/31/2024 101  98 - 107 mmol/L Final    CO2 10/31/2024 29  21 - 32 mmol/L  Final    Anion Gap 10/31/2024 8.0  3.0 - 11.0 mmol/L Final    BUN 10/31/2024 25.0 (H)  7.0 - 18.0 mg/dL Final    Creatinine 10/31/2024 1.35 (H)  0.55 - 1.02 mg/dL Final    GFR ESTIMATION 10/31/2024 46 (L)  >60 Final    BUN/Creatinine Ratio 10/31/2024 18.51  Ratio Final    Glucose 10/31/2024 197 (H)  74 - 106 mg/dL Final    Calcium 10/31/2024 9.4  8.5 - 10.1 mg/dL Final    Total Bilirubin 10/31/2024 0.4  0.2 - 1.0 mg/dL Final    AST 10/31/2024 15  15 - 37 U/L Final    ALT 10/31/2024 25  13 - 56 U/L Final    Total Protein 10/31/2024 6.9  6.4 - 8.2 g/dL Final    Albumin 10/31/2024 3.6  3.4 - 5.0 g/dL Final    Globulin 10/31/2024 3.3  2.3 - 3.5 g/dL Final    Albumin/Globulin Ratio 10/31/2024 1.1  1.1 - 1.8 Ratio Final    Alkaline Phosphatase 10/31/2024 114  45 - 117 U/L Final    Free T4 10/31/2024 1.33  0.76 - 1.46 ng/dL Final    TSH 10/31/2024 2.130  0.358 - 3.74 uIU/mL Final   Orders Only on 07/25/2024   Component Date Value Ref Range Status    Microalb, Ur 07/25/2024 3.9  0 - 20 mg/L Final    Creatinine, Urine 07/25/2024 6.1 (L)  10 - 175 mg/dL Final    Microalb/Creat Ratio 07/25/2024 63 (H)  0 - 30 mg/g Final   Orders Only on 07/25/2024   Component Date Value Ref Range Status    Estimated Avg Glucose 07/25/2024 175  mg/dL Final    Hemoglobin A1C 07/25/2024 7.1 (H)  4.2 - 6.3 % Final   Orders Only on 07/25/2024   Component Date Value Ref Range Status    Sodium 07/25/2024 135  131 - 143 mmol/L Final    Potassium 07/25/2024 4.8  3.5 - 5.1 mmol/L Final    Chloride 07/25/2024 99  98 - 107 mmol/L Final    CO2 07/25/2024 29  21 - 32 mmol/L Final    Anion Gap 07/25/2024 7.0  3.0 - 11.0 mmol/L Final    BUN 07/25/2024 18.0  7.0 - 18.0 mg/dL Final    Creatinine 07/25/2024 1.34 (H)  0.55 - 1.02 mg/dL Final    GFR ESTIMATION 07/25/2024 41 (L)  >60 Final    BUN/Creatinine Ratio 07/25/2024 13.43  Ratio Final    Glucose 07/25/2024 181 (H)  74 - 106 mg/dL Final    Calcium 07/25/2024 9.8  8.5 - 10.1 mg/dL Final    Total Bilirubin  07/25/2024 0.3  0.2 - 1.0 mg/dL Final    AST 07/25/2024 21  15 - 37 U/L Final    ALT 07/25/2024 28  13 - 56 U/L Final    Total Protein 07/25/2024 7.5  6.4 - 8.2 g/dL Final    Albumin 07/25/2024 3.6  3.4 - 5.0 g/dL Final    Alkaline Phosphatase 07/25/2024 132 (H)  45 - 117 U/L Final    Cholesterol 07/25/2024 226 (H)  <200 mg/dL Final    Triglycerides 07/25/2024 274 (H)  0 - 149 mg/dL Final    HDL 07/25/2024 53  >39 mg/dL Final    LDL Cholesterol 07/25/2024 118.2  mg/dL Final    VLDL 07/25/2024 55  mg/dL Final    Free T4 07/25/2024 1.11  0.76 - 1.46 ng/dL Final    TSH 07/25/2024 2.070  0.358 - 3.74 uIU/mL Final   Orders Only on 07/25/2024   Component Date Value Ref Range Status    WBC 07/25/2024 10.2 (H)  4.5 - 10.0 10*3/uL Final    RBC 07/25/2024 4.66  4.20 - 5.40 10*6/uL Final    Hemoglobin 07/25/2024 12.9  12.0 - 16.0 g/dL Final    Hematocrit 07/25/2024 41.1  37.0 - 47.0 % Final    MCV 07/25/2024 88.2  80.0 - 99.0 fL Final    MCH 07/25/2024 27.7  27.0 - 32.0 pg Final    MCHC 07/25/2024 31.4 (L)  32.0 - 36.0 % Final    RDW RBC Auto-Rto 07/25/2024 17.2 (H)  0.0 - 15.5 % Final    Platelets 07/25/2024 229  130 - 400 10*3/uL Final    MPV 07/25/2024 11.2  9.2 - 12.2 fL Final    Neutrophils 07/25/2024 56.7  50 - 80 % Final    Immature Granulocytes 07/25/2024 1.00 (H)  0.0 - 0.43 % Final    Lymphocytes 07/25/2024 23.4  20.0 - 45.0 % Final    Monocytes 07/25/2024 9.9  2 - 10 % Final    Eosinophils 07/25/2024 8.0 (H)  0 - 6 % Final    Basophils 07/25/2024 1.0  0 - 3 % Final    nRBC# 07/25/2024 0.0  0 - 0.2 % Final    Neutrophils Absolute 07/25/2024 5.8  1.4 - 7.0 10*3/uL Final    Immature Granulocytes Absolute 07/25/2024 0.1000 (H)  0.0 - 0.0310 thou/uL Final    Lymphocytes Absolute 07/25/2024 2.4  1.2 - 4.0 10*3/uL Final    Monocytes Absolute 07/25/2024 1.0 (H)  0.1 - 0.8 10*3/uL Final    Eosinophils Absolute 07/25/2024 0.8 (H)  0.0 - 0.6 10*3/uL Final    Basophils Absolute 07/25/2024 0.1  0.0 - 0.3 10*3/uL Final    nRBC  Count Absolute 07/25/2024 0.000  0 - 0.012 thou/uL Final         Assessment:      1. Benign essential HTN    2. Flu-like symptoms    3. Chronic kidney disease, stage 3a    4. Other emphysema    5. Mixed hyperlipidemia    6. Type 2 diabetes mellitus with diabetic polyneuropathy, with long-term current use of insulin    7. Morbid obesity with BMI of 40.0-44.9, adult    8. Gastroesophageal reflux disease with esophagitis without hemorrhage    9. Other chronic back pain    10. Anxiety and depression    11. Type 2 diabetes mellitus with other neurologic complication, with long-term current use of insulin    12. Acute upper respiratory infection    13. Acute cough    14. SOB (shortness of breath)    15. Wheezing          Plan:     Benign essential HTN    Flu-like symptoms  -     SARS Coronavirus 2 Antigen, POCT Manual Read  -     POCT rapid strep A    Chronic kidney disease, stage 3a    Other emphysema    Mixed hyperlipidemia    Type 2 diabetes mellitus with diabetic polyneuropathy, with long-term current use of insulin    Morbid obesity with BMI of 40.0-44.9, adult    Gastroesophageal reflux disease with esophagitis without hemorrhage    Other chronic back pain    Anxiety and depression    Type 2 diabetes mellitus with other neurologic complication, with long-term current use of insulin    Acute upper respiratory infection  -     amoxicillin (AMOXIL) 500 MG Tab; Take 1 tablet (500 mg total) by mouth every 12 (twelve) hours. for 7 days  Dispense: 14 tablet; Refill: 0    Acute cough  -     benzonatate (TESSALON) 100 MG capsule; Take 1 capsule (100 mg total) by mouth 3 (three) times daily as needed for Cough.  Dispense: 30 capsule; Refill: 0    SOB (shortness of breath)  -     albuterol (PROVENTIL/VENTOLIN HFA) 90 mcg/actuation inhaler; INHALE 2 PUFFS BY MOUTH EVERY EVENING AS NEEDED for SHORTNESS OF BREATH or FOR WHEEZING thank you**ynes** :-)  Dispense: 18 g; Refill: 2    Wheezing  -     albuterol (PROVENTIL/VENTOLIN HFA)  90 mcg/actuation inhaler; INHALE 2 PUFFS BY MOUTH EVERY EVENING AS NEEDED for SHORTNESS OF BREATH or FOR WHEEZING thank you**ynes** :-)  Dispense: 18 g; Refill: 2         Current Outpatient Medications   Medication Sig Dispense Refill    amitriptyline (ELAVIL) 50 MG tablet TAKE 1 TABLET EVERY EVENING AS DIRECTED 90 tablet 3    ammonium lactate (LAC-HYDRIN) 12 % lotion APPLY TO AFFECTED AREA 2 TIMES A DAY thank youmike -)      atorvastatin (LIPITOR) 80 MG tablet Take 1 tablet (80 mg total) by mouth every evening. 90 tablet 3    azelastine (ASTELIN) 137 mcg (0.1 %) nasal spray USE 1 SPRAY NASALLY TWICE DAILY 60 mL 3    cholecalciferol, vitamin D3, 1,250 mcg (50,000 unit) capsule TAKE 1 CAPSULE BY MOUTH WEEKLY AS DIRECTED 12 capsule 3    cyclobenzaprine (FLEXERIL) 10 MG tablet TAKE 1 TABLET BY MOUTH 3 TIMES DAILY AS NEEDED FOR MUSCLE SPASM(S). MAY MAKE DROWSY 90 tablet 11    DULoxetine (CYMBALTA) 30 MG capsule TAKE 1 CAPSULE TWICE DAILY AS NEEDED FOR PAIN 180 capsule 3    fluticasone propionate (FLONASE) 50 mcg/actuation nasal spray USE 1 SPRAY IN EACH NOSTRIL ONE TIME DAILY 32 g 3    folic acid (FOLVITE) 1 MG tablet TAKE 1 TABLET BY MOUTH DAILY. 90 tablet 3    furosemide (LASIX) 40 MG tablet Take 1 tablet (40 mg total) by mouth once daily. 90 tablet 1    levothyroxine (SYNTHROID) 125 MCG tablet       LOMAIRA 8 mg Tab Take 1 tablet by mouth 3 (three) times daily with meals.      loratadine (CLARITIN) 10 mg tablet Take 10 mg by mouth.      meloxicam (MOBIC) 7.5 MG tablet TAKE 1 TABLET BY MOUTH 2 TIMES A DAY FOR 10 TO 14 DAYS WITH FOOD AND REST FOR 2 TO 4 DAYS AND CONTINUE IF PAIN REOCCURS 180 tablet 3    MOUNJARO 15 mg/0.5 mL PnIj Inject 15 mg into the skin once a week. 4 pen 2    potassium chloride SA (K-DUR,KLOR-CON) 20 MEQ tablet TAKE 1 TABLET EVERY NIGHT 90 tablet 3    pregabalin (LYRICA) 300 MG Cap Take 1 capsule (300 mg total) by mouth 2 (two) times daily. 180 capsule 3    TOUJEO MAX U-300 SOLOSTAR 300 unit/mL (3  "mL) insulin pen INJECT 100 UNITS SUBCUTANEOUS once DAILY      TRUEPLUS PEN NEEDLE 32 gauge x 5/32" Ndle USE DAILY OR AS DIRECTED      varenicline (CHANTIX) 1 mg Tab Take 1 tablet (1 mg total) by mouth 2 (two) times daily. 56 tablet 0    albuterol (PROVENTIL/VENTOLIN HFA) 90 mcg/actuation inhaler INHALE 2 PUFFS BY MOUTH EVERY EVENING AS NEEDED for SHORTNESS OF BREATH or FOR WHEEZING thank you**ynes** :-) 18 g 2    benzonatate (TESSALON) 100 MG capsule Take 1 capsule (100 mg total) by mouth 3 (three) times daily as needed for Cough. 30 capsule 0    COMBIVENT RESPIMAT  mcg/actuation inhaler INHALE 1 PUFF INTO THE LUNGS EVERY 6 (SIX) HOURS AS NEEDED FOR WHEEZING OR SHORTNESS OF BREATH (RESCUE) 12 g 3    estradioL (ESTRACE) 1 MG tablet TAKE 1 TABLET ONE TIME DAILY 90 tablet 3    losartan (COZAAR) 100 MG tablet TAKE 1 TABLET ONE TIME DAILY 90 tablet 3    XIGDUO XR 5-1,000 mg TAKE 2 TABLETS EVERY MORNING 180 tablet 3     No current facility-administered medications for this visit.       Medications Discontinued During This Encounter   Medication Reason    levothyroxine (SYNTHROID) 88 MCG tablet     albuterol (PROVENTIL/VENTOLIN HFA) 90 mcg/actuation inhaler Reorder       Health Maintenance   Topic Date Due    Pneumococcal Vaccines (Age 0-64) (1 of 2 - PCV) Never done    Eye Exam  Never done    TETANUS VACCINE  08/06/2014    Shingles Vaccine (1 of 2) Never done    Colorectal Cancer Screening  06/27/2024    Influenza Vaccine (1) 09/01/2024    COVID-19 Vaccine (3 - 2024-25 season) 09/01/2024    Foot Exam  09/11/2024    Hemoglobin A1c  04/30/2025    Diabetes Urine Screening  07/25/2025    Lipid Panel  07/25/2025    Mammogram  09/03/2025    Low Dose Statin  11/18/2025    RSV Vaccine (Age 60+ and Pregnant patients) (1 - 1-dose 75+ series) 10/14/2041    Hepatitis C Screening  Completed    HIV Screening  Completed       Patient Instructions   RTC in 3 months for F/U or sooner if needed.    Patient Education       Bacterial " Upper Respiratory Infection, Adult   About this topic   Germs cause this health problem. You have signs in your nose, windpipe, voice box or larynx, throat, ears, or lungs that last for days or weeks. It often spreads from a person who is sick to some other person from close contact. This health problem may include:  Sore throat. This is pharyngitis.  Swelling of the lining of the nose. This is rhinitis.  Swelling of the sinuses with pain in the face, forehead, or upper teeth. This is sinusitis.  Swelling of the nose and throat. This is nasopharyngitis.  Swelling of the upper part of the voice box. This is epiglottitis.  Swelling of the voice box. This is laryngitis.  Cough  What are the causes?   The main cause is an infection by certain germs.  What can make this more likely to happen?   Cold or winter season  Low immune system  Close contact with someone who has an upper respiratory infection  Allergies or asthma  Working in a school or day care center  What are the main signs?   Cough or sneezing  Sore throat  Runny or stuffy nose  Ear pain  Fever  Headache  Muscle pain  Tired  Weakness  How does the doctor diagnose this health problem?   Your doctor will do an exam and ask about your history. Your doctor will check your nose, throat, and lungs. The doctor may order:  Lab tests  Throat swab  Chest x-ray  CT or MRI scan  How does the doctor treat this health problem?   Your doctor may give you drugs to treat or prevent infection. You may also be given other drugs based on your condition. Talk to your doctor about what drugs you need to take.  What lifestyle changes are needed?   You need to rest while you are getting better. If your throat is sore, don't talk too much. This will rest your voice and throat. Drink plenty of fluids to stay hydrated.  What drugs may be needed?   The doctor may order drugs to:  Help with pain and swelling  Fight an infection  Dry up a stuffy nose  Stop wheezing  Control coughing  What  can be done to prevent this health problem?   Wash your hands often with soap and water for at least 20 seconds, especially after coughing or sneezing. Alcohol-based hand sanitizers also work to kill the virus.  If you are sick, cover your mouth and nose with tissue when you cough or sneeze. You can also cough into your elbow. Throw away tissues in the trash and wash your hands after touching used tissues.  Clean commonly handled things like door handles, remotes, toys, and phones. Wipe them with a disinfectant.  Do not get too close (kissing, hugging) to people who are sick.  Do not share food, drinks, towels, or hankies with anyone who is sick.  Stay away from crowded places.  Where can I learn more?   American Academy of Family Physicians  https://familydoctor.org/antibiotic-resistance/   American Academy of Family Physicians  https://familydoctor.org/condition/colds-and-the-flu/   Centers for Disease Control and Prevention  http://www.cdc.gov/getsmart/antibiotic-use/URI/index.html   NHS Choices  http://www.nhs.uk/conditions/Respiratory-tract-infection/Pages/Introduction.aspx   Last Reviewed Date   2020-01-24  Consumer Information Use and Disclaimer   This information is not specific medical advice and does not replace information you receive from your health care provider. This is only a brief summary of general information. It does NOT include all information about conditions, illnesses, injuries, tests, procedures, treatments, therapies, discharge instructions or life-style choices that may apply to you. You must talk with your health care provider for complete information about your health and treatment options. This information should not be used to decide whether or not to accept your health care providers advice, instructions or recommendations. Only your health care provider has the knowledge and training to provide advice that is right for you.  Copyright   Copyright © 2021 [employer] and its  "affiliates and/or licensors. All rights reserved.    Patient Education       Controlling Your Blood Pressure Through Lifestyle   The Basics   Written by the doctors and editors at [facility]   What does my lifestyle have to do with my blood pressure? -- The things you do and the foods you eat have a big effect on your blood pressure and your overall health. Following the right lifestyle can:  Lower your blood pressure or keep you from getting high blood pressure in the first place  Reduce your need for blood pressure medicines  Make medicines for high blood pressure work better, if you do take them  Lower the chances that you'll have a heart attack or stroke, or develop kidney disease  Which lifestyle choices will help lower my blood pressure? -- Here's what you can do:  Lose weight (if you are overweight)  Choose a diet rich in fruits, vegetables, and low-fat dairy products, and low in meats, sweets, and refined grains  Eat less salt (sodium)  Do something active for at least 30 minutes a day on most days of the week  Limit the amount of alcohol you drink  If you have high blood pressure, it's also very important to quit smoking (if you smoke). Quitting smoking might not bring your blood pressure down. But it will lower the chances that you'll have a heart attack or stroke, and it will help you feel better and live longer.  Start low and go slow -- The changes listed above might sound like a lot, but don't worry. You don't have to change everything all at once. The key to improving your lifestyle is to "start low and go slow." Choose 1 small, specific thing to change and try doing it for a while. If it works for you, keep doing it until it becomes a habit. If it doesn't, don't give up. Choose something else to change and see how that goes.  Let's say, for example, that you would like to improve your diet. If you're the type of person who eats cheeseburgers and French fries all the time, you can't switch to eating " "just salads from one day to the next. When people try to make changes like that, they often fail. Then they feel frustrated and tend to give up. So instead of trying to change everything about your diet in 1 day, change 1 or 2 small things about your diet and give yourself time to get used to those changes. For instance, keep the cheeseburger but give up the French fries. Or eat the same things but cut your portions in half.  As you find things that you are able to change and stick with, keep adding new changes. In time, you will see that you can actually change a lot. You just have to get used to the changes slowly.  Lose weight -- When people think about losing weight, they sometimes make it more complicated than it really is. To lose weight, you have to either eat less or move more. If you do both of those things, it's even better. But there is no single weight-loss diet or activity that's better than any other. When it comes to weight loss, the most effective plan is the one that you'll stick with.  Improve your diet -- There is no single diet that is right for everyone. But in general, a healthy diet can include:  Lots of fruits, vegetables, and whole grains  Some beans, peas, lentils, chickpeas, and similar foods  Some nuts, such as walnuts, almonds, and peanuts  Fat-free or low-fat milk and milk products  Some fish  To have a healthy diet, it's also important to limit or avoid sugar, sweets, meats, and refined grains. (Refined grains are found in white bread, white rice, most forms of pasta, and most packaged "snack" foods.)  Reduce salt -- Many people think that eating a low-sodium diet means avoiding the salt shaker and not adding salt when cooking. The truth is, not adding salt at the table or when you cook will only help a little. Almost all of the sodium you eat is already in the food you buy at the grocery store or at restaurants (figure 1).  The most important thing you can do to cut down on sodium is " "to eat less processed food. That means that you should avoid most foods that are sold in cans, boxes, jars, and bags. You should also eat in restaurants less often.  To reduce the amount of sodium you get, buy fresh or fresh-frozen fruits, vegetables, and meats. (Fresh-frozen foods have had nothing added to them before freezing.) Then you can make meals at home, from scratch, with these ingredients.  As with the other changes, don't try to cut out salt all at once. Instead, choose 1 or 2 foods that have a lot of sodium and try to replace them with low-sodium choices. When you get used to those low-sodium options, find another food or 2 to change. Then keep going, until all the foods you eat are sodium-free or low in sodium.  Become more active -- If you want to be more active, you don't have to go to the gym or get all sweaty. It is possible to increase your activity level while doing everyday things you enjoy. Walking, gardening, and dancing are just a few of the things that you might try. As with all the other changes, the key is not to do too much too fast. If you don't do any activity now, start by walking for just a few minutes every other day. Do that for a few weeks. If you stick with it, try doing it for longer. But if you find that you don't like walking, try a different activity.  Drink less alcohol -- If you are a woman, do not have more than 1 "standard drink" of alcohol a day. If you are a man, do not have more than 2. A "standard drink" is:  A can or bottle that has 12 ounces of beer  A glass that has 5 ounces of wine  A shot that has 1.5 ounces of whiskey  Where should I start? -- If you want to improve your lifestyle, start by making the changes that you think would be easiest for you. If you used to exercise and just got out of the habit, maybe it would be easy for you to start exercising again. Or if you actually like cooking meals from scratch, maybe the first thing you should focus on is eating " "home-cooked meals that are low in sodium.  Whatever you tackle first, choose specific, realistic goals, and give yourself a deadline. For example, do not decide that you are going to "exercise more." Instead, decide that you are going to walk for 10 minutes on Monday, Wednesday, and Friday, and that you are going to do this for the next 2 weeks.  When lifestyle changes are too general, people have a hard time following through.  Now go. You can do it!  All topics are updated as new evidence becomes available and our peer review process is complete.  This topic retrieved from [employer] on: Sep 21, 2021.  Topic 04304 Version 8.0  Release: 29.4.2 - C29.263  © 2021 UpToDate, Inc. and/or its affiliates. All rights reserved.  figure 1: Sources of sodium in your diet     Graphic 86893 Version 2.0    Consumer Information Use and Disclaimer   This information is not specific medical advice and does not replace information you receive from your health care provider. This is only a brief summary of general information. It does NOT include all information about conditions, illnesses, injuries, tests, procedures, treatments, therapies, discharge instructions or life-style choices that may apply to you. You must talk with your health care provider for complete information about your health and treatment options. This information should not be used to decide whether or not to accept your health care provider's advice, instructions or recommendations. Only your health care provider has the knowledge and training to provide advice that is right for you. The use of this information is governed by the [employer] End User License Agreement, available at https://www.[employer]uu.[employer]Serv/en/solutions/[employer]/about/bhargav.The use of [employer] content is governed by the [employer] Terms of Use. ©2021 UpToDate, Inc. All rights reserved.  Copyright   © 2021 UpToDate, Inc. and/or its affiliates. All rights reserved.    Patient Education       Diabetes " "and Diet   The Basics   Written by the doctors and editors at [facility]   Why is diet important in diabetes? -- Diet is important because it is part of diabetes treatment. Many people need to change what they eat and how much they eat to help treat their diabetes. It is important for people to treat their diabetes so that they:  Keep their blood sugar at or near a normal level  Prevent long-term problems, such as heart or kidney problems, that can happen in people with diabetes  Changing your diet can also help treat obesity, high blood pressure, and high cholesterol. These conditions can affect people with diabetes and can lead to future problems, such as heart attacks or strokes.  Who will work with me to change my diet? -- Your doctor or nurse will work with you to make a food plan to change your diet. They might also recommend that you work with a "dietitian." A dietitian is an expert on food and eating.  Do I need to eat at the same times every day? -- When and how often you should eat depends, in part, on the diabetes medicines you take. For example:  People who take about the same amount of insulin at the same time each day (called a "fixed regimen") should eat meals at the same times. This is also true for people who take pills that increase insulin levels, such as sulfonylureas. Eating meals at the same time every day helps prevent low blood sugar.  People who adjust the dose and timing of their insulin each day (called a "flexible regimen") do not always have to eat meals at the same time. That's because they can time their insulin dose for before they plan to eat, and also adjust the dose for how much they plan to eat.  People who take medicines that don't usually cause low blood sugar, such as metformin, don't have to eat meals at the same time every day.  What do I need to think about when planning what to eat? -- Our bodies break down the food we eat into small pieces called carbohydrates, proteins, and " "fats.  When planning what to eat, people with diabetes need to think about:  Carbohydrates (or "carbs") - Carbohydrates, which are sugars that our bodies use for energy, can raise a person's blood sugar level. Your doctor, nurse, or dietitian will tell you how many carbohydrates you should eat at each meal or snack. Foods that have carbohydrates include:  Bread, pasta, and rice  Vegetables and fruits  Dairy foods  Foods and drinks with added sugar  It is best to get your carbohydrates from fruits, vegetables, whole grains, and low-fat milk. It is best to avoid drinks with added sugar, like soda, juices, and sports drinks.   Protein - Your doctor, nurse, or dietitian will tell you how much protein you should eat each day. It is best to eat lean meats, fish, eggs, beans, peas, soy products, nuts, and seeds.  Fats - The type of fat you eat is more important than the amount of fat. "Saturated" and "trans" fats can increase your risk for heart problems, like a heart attack.  Foods that have saturated fats include meat, butter, cheese, and ice cream.  Foods that have trans fats include processed food with "partially hydrogenated oils" on the ingredient list. This may include fried foods, store bought cookies, muffins, pies, and cakes.  "Monounsaturated" and "polyunsaturated" fats are better for you. Foods with these types of fat include fish, avocado, olive oil, and nuts.  Calories - People need to eat a certain amount of calories each day to keep their weight the same. People who are overweight and want to lose weight need to eat fewer calories each day.  Fiber - Eating foods with a lot of fiber can help control a person's blood sugar level. Foods that have a lot of fiber include apples, green beans, peas, beans, lentils, nuts, oatmeal, and whole grains.  Salt - People who have high blood pressure should not eat foods that contain a lot of salt (also called sodium). People with high blood pressure should also eat healthy " foods, such as fruits, vegetables, and low-fat dairy foods.  Alcohol - Having more than 1 drink (for women) or 2 drinks (for men) a day can raise blood sugar levels. Also, drinks that have fruit juice or soda in them can raise blood sugar levels.  What can I do if I need to lose weight? -- If you need to lose weight, you can:  Exercise - Try to get at least 30 minutes of physical activity a day, most days of the week. Even gentle exercise, like walking, is good for your health. Some people with diabetes need to change their medicine dose before they exercise. They might also need to check their blood sugar levels before and after exercising.  Eat fewer calories - Your doctor, nurse, or dietitian can tell you how many calories you should eat each day in order to lose weight.  If you are worried about your weight, size, or shape, talk with your doctor, nurse, or dietitian. They can help you make changes to improve your health.  Can I eat the same foods as my family? -- Yes. You do not need to eat special foods if you have diabetes. You and your family can eat the same foods. Changing your diet is mostly about eating healthy foods and not eating too much.  What are the other parts of diabetes treatment? -- Besides changing your diet, the other parts of diabetes treatment are:  Exercise  Medicines  Some people with diabetes need to learn how to match their diet and exercise with their medicine dose. For example, people who use insulin might need to choose the dose of insulin they give themselves. To choose their dose, they need to think about:  What they plan to eat at the next meal  How much exercise they plan to do  What their blood sugar level is  If the diet and exercise do not match the medicine dose, a person's blood sugar level can get too low or too high. Blood sugar levels that are too low or too high can cause problems.  All topics are updated as new evidence becomes available and our peer review process is  complete.  This topic retrieved from [employer] on: Sep 21, 2021.  Topic 87804 Version 7.0  Release: 29.4.2 - C29.263  © 2021 UpToDate, Inc. and/or its affiliates. All rights reserved.  Consumer Information Use and Disclaimer   This information is not specific medical advice and does not replace information you receive from your health care provider. This is only a brief summary of general information. It does NOT include all information about conditions, illnesses, injuries, tests, procedures, treatments, therapies, discharge instructions or life-style choices that may apply to you. You must talk with your health care provider for complete information about your health and treatment options. This information should not be used to decide whether or not to accept your health care provider's advice, instructions or recommendations. Only your health care provider has the knowledge and training to provide advice that is right for you. The use of this information is governed by the [employer] End User License Agreement, available at https://www.[employer]en/solutions/[employer]/about/bhargav.The use of [employer] content is governed by the [employer] Terms of Use. ©2021 UpToDate, Inc. All rights reserved.  Copyright   © 2021 UpToDate, Inc. and/or its affiliates. All rights reserved.    Patient Education       Low Cholesterol, Saturated Fat, and Trans Fat Diet   About this topic   Cholesterol, saturated fat, and trans fat are in many foods. These may raise your blood cholesterol levels. If your cholesterol is too high, this can cause health problems in your heart, liver, kidneys, and even your eyes. The key to lowering your risk of heart problems is to lower your bad fat intake.  Saturated fats and trans fats are the bad fats. These fats clog your arteries and raise your bad cholesterol. Saturated fats and trans fats are solid fats at room temperature. Saturated fats are animal fats. Trans fats are manmade fats. They add  "flavor to a lot of packaged foods. Staying away from saturated and trans fats will help your heart.  When you do eat foods with fat, make sure they have the good fats. Monounsaturated and polyunsaturated fats are good fats. These fats help raise your good cholesterol and protect your heart.  General   How to Lower Fat and Cholesterol in Your Diet   Read the labels of the foods you buy from the market to find out how much fat is present. Under 5% of total fat on a label means it is "low fat". Over 20% of total fat on a label means it is high fat.  Eat high fiber foods, like soluble fiber. This type of fiber helps lower cholesterol in the body. Choose oatmeal, fruits (like apples), beans, and nuts to get the most soluble fiber.  Eat foods high in omega-3 fatty acids like gopal seeds, walnuts, salmon, tuna, trout, herring, flaxseed, and soybeans. These foods help keep the heart healthy.  Limit your bad fat and oil intake.  Stay away from butter, stick margarine, shortening, lard, and palm and coconut oil. Pick plant-based spreads instead.  Limit mayonnaise, salad dressings, gravies, and sauces, unless it is made from low-fat ingredients.  Limit chocolate.  Do not eat high-fat processed foods like hot dogs, feliciano, sausage, ham and other luncheon meats high in fat, and some frozen foods. Pick fish, chicken, turkey, and lean meats instead.  Eat more dried beans, lentils, and tofu to get your protein.  Do not eat organ meats, like liver.  Choose nonfat or low-fat milk, yogurt, and cheese.  Use light or fat-free cream cheese and sour cream.  Eat lots of fruits and vegetables.  Pick whole grain breads, cereals, pastas, and rice.  Do not eat snacks that are high in fats like granola, cookies, pies, pastries, doughnuts, and croissants.  Stay away from deep fried foods.  Help When Cooking   Remove the fat portion of meats and the skin from poultry before cooking.  Bake, broil, grill, poach, or roast poultry, fish, and lean " meats.  Drain and throw away the fat that drains out of meat as you cook it.  Try to add little or no fat to foods.  Use olive or canola oil for cooking or baking.  Steam your vegetables.  Use herbs or no-oil marinades to flavor foods.         Who should use this diet?   This diet is for people who are at high risk of getting health problems like heart disease, high blood pressure, diabetes, and others. This diet is also good for all people to follow to keep your heart healthy.  What foods are good to eat?   Foods with good fats are:  Canola, peanut, and olive oil  Safflower, soybean, and corn oil  Walnuts, almonds, cashews, and peanuts  Pumpkin and sunflower seeds  [place] and tuna  Tofu  Soymilk  Avocado  What foods should be limited or avoided?   Stay away from these types of foods that have saturated fats:  Whole fat dairy products like cheese, ice cream, whole milk, and cream  Palm and coconut oils  High-fat meats like beef, lamb, poultry with the skin, feliciano, and sausage  Butter and lard  Stay away from these types of foods that may have trans fat:  Cookies, cakes, candy, doughnuts, baked goods, muffins, pizza dough, and pie crusts that are packaged  Fried foods  Frozen dinners  Chips and crackers  Microwave popcorn  Stick margarine and vegetable shortenings  Helpful tips   To help stay away from saturated fat:  Pick lean cuts of meat  Take the skin off chicken and turkey or pick skinless  Pick low-fat cheese, milk, and ice cream  Use liquid oils when cooking and baking, such as olive oil and canola oil  To help stay away from trans fat:  Look at your labels. Choose foods with 0% trans fat. Read the ingredient list. Avoid foods with partially hydrogenated oil in the ingredient list. This means there is trans fat in the product.  Where can I learn more?   American Heart Association   http://www.heart.org/HEARTORG/Conditions/Cholesterol/PreventionTreatmentofHighCholesterol/Know-Your-Fats_[facility]_305628_Article.jsp    Last Reviewed Date   2021-10-05  Consumer Information Use and Disclaimer   This information is not specific medical advice and does not replace information you receive from your health care provider. This is only a brief summary of general information. It does NOT include all information about conditions, illnesses, injuries, tests, procedures, treatments, therapies, discharge instructions or life-style choices that may apply to you. You must talk with your health care provider for complete information about your health and treatment options. This information should not be used to decide whether or not to accept your health care providers advice, instructions or recommendations. Only your health care provider has the knowledge and training to provide advice that is right for you.   Copyright   Copyright © 2021 UpToDate, Inc. and its affiliates and/or licensors. All rights reserved.            Risks, benefits, and alternatives discussed with patient, Patient verbalized understanding of discussed plan of care. Asked patient if any further questions, answered no.    Future Appointments   Date Time Provider Department Center   12/2/2024 10:00 AM Ellie Braswell, [place] [facility] [place]   3/5/2025  2:20 PM Yaima Pulliam, NP [id number] Pulliam NP

## 2024-11-18 NOTE — PROGRESS NOTES
Individual Follow-Up Form    11/18/2024    Quit Date: TBD    Clinical Status of Patient: Outpatient    Length of Service: 60 minutes    Continuing Medication: yes  Chantix     Target Symptoms: Withdrawal and medication side effects. The following were  rated moderate (3) to severe (4) on TCRS:  Moderate (3): None Reported  Severe (4): None Reported    Comments: Spoke with patient at length in clinic regarding tobacco cessation progress. Patient remains on prescribed tobacco cessation medication 1mg Chantix without any negative side effects at this time.  Patient states she didn't smoke 3 days last week and smoked 2 cigarettes daily for 4 days.   Patient will make home and vehicle tobacco free.   Reviewed strategies, habitual behavior, stress, and high risk situations. Introduced stress with addition interventions, SOLVE, relaxation with interventions, nutrition, exercise, weight gain, and the importance of rewarding oneself for accomplishments toward becoming tobacco free. Open discussion of all items with interventions.  Counselor discussed the 4ds (delay, drink, distract, deep breathing) to address nicotine cravings.  Patient appeared to be receptive to the information given.  Counselor will continue to motivate patient to be tobacco free.      Diagnosis: F17.200    Next Visit: 1 week

## 2024-11-21 ENCOUNTER — TELEPHONE (OUTPATIENT)
Dept: SMOKING CESSATION | Facility: CLINIC | Age: 59
End: 2024-11-21
Payer: MEDICARE

## 2024-11-24 DIAGNOSIS — E28.39 OTHER PRIMARY OVARIAN FAILURE: ICD-10-CM

## 2024-11-24 DIAGNOSIS — R06.02 SOB (SHORTNESS OF BREATH): ICD-10-CM

## 2024-11-24 DIAGNOSIS — Z79.4 TYPE 2 DIABETES MELLITUS WITH DIABETIC POLYNEUROPATHY, WITH LONG-TERM CURRENT USE OF INSULIN: ICD-10-CM

## 2024-11-24 DIAGNOSIS — I10 ESSENTIAL (PRIMARY) HYPERTENSION: ICD-10-CM

## 2024-11-24 DIAGNOSIS — J43.8 OTHER EMPHYSEMA: Chronic | ICD-10-CM

## 2024-11-24 DIAGNOSIS — R06.2 WHEEZING: ICD-10-CM

## 2024-11-24 DIAGNOSIS — E11.42 TYPE 2 DIABETES MELLITUS WITH DIABETIC POLYNEUROPATHY, WITH LONG-TERM CURRENT USE OF INSULIN: ICD-10-CM

## 2024-11-25 RX ORDER — DAPAGLIFLOZIN AND METFORMIN HYDROCHLORIDE 5; 1000 MG/1; MG/1
TABLET, FILM COATED, EXTENDED RELEASE ORAL
Qty: 180 TABLET | Refills: 3 | Status: SHIPPED | OUTPATIENT
Start: 2024-11-25

## 2024-11-25 RX ORDER — LOSARTAN POTASSIUM 100 MG/1
100 TABLET ORAL
Qty: 90 TABLET | Refills: 3 | Status: SHIPPED | OUTPATIENT
Start: 2024-11-25

## 2024-11-25 RX ORDER — IPRATROPIUM BROMIDE AND ALBUTEROL 20; 100 UG/1; UG/1
SPRAY, METERED RESPIRATORY (INHALATION)
Qty: 12 G | Refills: 3 | Status: SHIPPED | OUTPATIENT
Start: 2024-11-25

## 2024-11-25 RX ORDER — ESTRADIOL 1 MG/1
1 TABLET ORAL
Qty: 90 TABLET | Refills: 3 | Status: SHIPPED | OUTPATIENT
Start: 2024-11-25

## 2024-12-02 ENCOUNTER — TELEPHONE (OUTPATIENT)
Dept: SMOKING CESSATION | Facility: CLINIC | Age: 59
End: 2024-12-02
Payer: MEDICARE

## 2024-12-03 ENCOUNTER — CLINICAL SUPPORT (OUTPATIENT)
Dept: SMOKING CESSATION | Facility: CLINIC | Age: 59
End: 2024-12-03
Payer: COMMERCIAL

## 2024-12-03 DIAGNOSIS — F17.200 NICOTINE DEPENDENCE: Primary | ICD-10-CM

## 2024-12-03 PROCEDURE — 99407 BEHAV CHNG SMOKING > 10 MIN: CPT | Mod: S$PBB,,, | Performed by: GENERAL PRACTICE

## 2024-12-09 ENCOUNTER — CLINICAL SUPPORT (OUTPATIENT)
Dept: SMOKING CESSATION | Facility: CLINIC | Age: 59
End: 2024-12-09
Payer: COMMERCIAL

## 2024-12-09 DIAGNOSIS — F17.200 NICOTINE DEPENDENCE: Primary | ICD-10-CM

## 2024-12-09 RX ORDER — VARENICLINE TARTRATE 1 MG/1
1 TABLET, FILM COATED ORAL 2 TIMES DAILY
Qty: 56 TABLET | Refills: 0 | Status: SHIPPED | OUTPATIENT
Start: 2024-12-09

## 2024-12-09 NOTE — PROGRESS NOTES
Individual Follow-Up Form    12/9/2024    Quit Date: TBD    Clinical Status of Patient: Outpatient    Length of Service: 30 minutes    Continuing Medication: yes  Chantix     Target Symptoms: Withdrawal and medication side effects. The following were  rated moderate (3) to severe (4) on TCRS:  Moderate (3): None Reported  Severe (4): None Reported    Comments: Spoke with patient at length in clinic regarding tobacco cessation progress. Patient remains on prescribed tobacco cessation medication 1mg Chantix without any negative side effects at this time.  Patient remains smoking 2 cigarettes per day.  Patient will cut down to 1 cigarette a day this week.  Reviewed strategies, habitual behavior, stress, and high risk situations. Introduced stress with addition interventions, SOLVE, relaxation with interventions, nutrition, exercise, weight gain, and the importance of rewarding oneself for accomplishments toward becoming tobacco free. Open discussion of all items with interventions.  Counselor also suggested that patient utilize jolly ranchers, dum dum lollipops, and toffee candy to help control nicotine cravings.  Counselor recommended that patient stay hydrated and eat fruits and vegetables to help decrease nicotine cravings.  Counselor submitted refill for Chantix prescription.  Patient appeared to be receptive to the information given.  Counselor will continue to motivate patient to be tobacco free.    Diagnosis: F17.200    Next Visit: 1 week    
No

## 2024-12-16 ENCOUNTER — TELEPHONE (OUTPATIENT)
Dept: SMOKING CESSATION | Facility: CLINIC | Age: 59
End: 2024-12-16
Payer: MEDICARE

## 2024-12-16 NOTE — TELEPHONE ENCOUNTER
Spoke with patient regarding scheduled follow up appointment.  Patient states she has a headache and requested to reschedule.  Appointment rescheduled for 12/23/2024 @ 11am.

## 2024-12-23 ENCOUNTER — TELEPHONE (OUTPATIENT)
Dept: SMOKING CESSATION | Facility: CLINIC | Age: 59
End: 2024-12-23
Payer: MEDICARE

## 2024-12-30 ENCOUNTER — TELEPHONE (OUTPATIENT)
Dept: SMOKING CESSATION | Facility: CLINIC | Age: 59
End: 2024-12-30
Payer: MEDICARE

## 2024-12-30 NOTE — TELEPHONE ENCOUNTER
Spoke with patient after receiving call and voice message requesting a call back.  Patient requested to schedule follow up appointment.  Appointment scheduled for 12/31/2024 @ 10am.

## 2024-12-31 ENCOUNTER — CLINICAL SUPPORT (OUTPATIENT)
Dept: SMOKING CESSATION | Facility: CLINIC | Age: 59
End: 2024-12-31
Payer: COMMERCIAL

## 2024-12-31 DIAGNOSIS — F17.200 NICOTINE DEPENDENCE: Primary | ICD-10-CM

## 2024-12-31 NOTE — PROGRESS NOTES
Individual Follow-Up Form    12/31/2024    Quit Date: TBD    Clinical Status of Patient: Outpatient    Length of Service: 60 minutes    Continuing Medication: yes  Chantix     Target Symptoms: Withdrawal and medication side effects. The following were  rated moderate (3) to severe (4) on TCRS:  Moderate (3): None Reported  Severe (4): None Reported    Comments: Spoke with patient at length via phone regarding tobacco cessation progress. Patient remains on prescribed tobacco cessation medication 1mg Chantix without any negative side effects at this time.  Patient decreased to 1 cigarettes daily.  Patient will cut down to 0 cigarettes a day this week.  Reviewed strategies, habitual behavior, stress, and high risk situations. Introduced stress with addition interventions, SOLVE, relaxation with interventions, nutrition, exercise, weight gain, and the importance of rewarding oneself for accomplishments toward becoming tobacco free. Open discussion of all items with interventions.  Counselor discussed the 4ds (delay, drink, distract, deep breathing) to address nicotine cravings.  Counselor also suggested that patient utilize jolly ranchers, dum dum lollipops, and toffee candy to help control nicotine cravings.  Counselor recommended that patient stay hydrated and eat fruits and vegetables to help decrease nicotine cravings.   Patient appeared to be receptive to the information given.  Counselor will continue to motivate patient to be tobacco free.     Diagnosis: F17.200    Next Visit: 1 week

## 2025-01-07 ENCOUNTER — CLINICAL SUPPORT (OUTPATIENT)
Dept: SMOKING CESSATION | Facility: CLINIC | Age: 60
End: 2025-01-07
Payer: COMMERCIAL

## 2025-01-07 DIAGNOSIS — F17.200 NICOTINE DEPENDENCE: Primary | ICD-10-CM

## 2025-01-07 PROCEDURE — 90853 GROUP PSYCHOTHERAPY: CPT | Mod: ,,, | Performed by: GENERAL PRACTICE

## 2025-01-07 NOTE — PROGRESS NOTES
Individual Follow-Up Form    1/7/2025    Quit Date: TBD    Clinical Status of Patient: Outpatient    Length of Service: 60 minutes    Continuing Medication: yes  Chantix     Target Symptoms: Withdrawal and medication side effects. The following were  rated moderate (3) to severe (4) on TCRS:  Moderate (3): None Reported  Severe (4): None Reported    Comments: Spoke with patient at length in clinic regarding tobacco cessation progress. Patient remains on prescribed tobacco cessation medication 1mg Chantix without any negative side effects at this time.  Patient remains smoking 1-2 cigarettes per day.  Patient will make home and vehicle tobacco free.  Reviewed strategies, habitual behavior, high risks situations, understanding urges and cravings, stress and relaxation with open discussion and additional interventions, Introduced lapses, relapses, understanding them and analyzing the situation of a lapse, conflict issues that may be linked to a lapse.  Counselor discussed the 4ds (delay, drink, distract, deep breathing) to address nicotine cravings.  Patient appeared to be receptive to the information given.  Counselor will continue to motivate patient to be tobacco free.    Diagnosis: F17.200    Next Visit: 1 week

## 2025-02-10 ENCOUNTER — TELEPHONE (OUTPATIENT)
Dept: SMOKING CESSATION | Facility: CLINIC | Age: 60
End: 2025-02-10
Payer: MEDICARE

## 2025-02-10 DIAGNOSIS — E78.2 MIXED HYPERLIPIDEMIA: ICD-10-CM

## 2025-02-10 DIAGNOSIS — M50.30 DEGENERATIVE DISC DISEASE, CERVICAL: ICD-10-CM

## 2025-02-10 DIAGNOSIS — M62.830 MUSCLE SPASM OF BACK: ICD-10-CM

## 2025-02-10 DIAGNOSIS — R18.8 OTHER ASCITES: ICD-10-CM

## 2025-02-11 RX ORDER — ATORVASTATIN CALCIUM 80 MG/1
80 TABLET, FILM COATED ORAL NIGHTLY
Qty: 90 TABLET | Refills: 3 | Status: SHIPPED | OUTPATIENT
Start: 2025-02-11

## 2025-02-11 RX ORDER — MELOXICAM 7.5 MG/1
TABLET ORAL
Qty: 180 TABLET | Refills: 3 | OUTPATIENT
Start: 2025-02-11

## 2025-02-11 RX ORDER — FUROSEMIDE 40 MG/1
40 TABLET ORAL DAILY
Qty: 90 TABLET | Refills: 3 | Status: SHIPPED | OUTPATIENT
Start: 2025-02-11

## 2025-02-11 RX ORDER — PREGABALIN 300 MG/1
300 CAPSULE ORAL 2 TIMES DAILY
Qty: 180 CAPSULE | Refills: 0 | Status: SHIPPED | OUTPATIENT
Start: 2025-02-11

## 2025-02-13 DIAGNOSIS — E53.8 FOLATE DEFICIENCY: ICD-10-CM

## 2025-02-18 ENCOUNTER — TELEPHONE (OUTPATIENT)
Dept: SMOKING CESSATION | Facility: CLINIC | Age: 60
End: 2025-02-18
Payer: MEDICARE

## 2025-02-18 RX ORDER — FOLIC ACID 1 MG/1
TABLET ORAL
Qty: 90 TABLET | Refills: 3 | Status: SHIPPED | OUTPATIENT
Start: 2025-02-18

## 2025-02-18 NOTE — TELEPHONE ENCOUNTER
2nd attempt, patient called in regards to 6 month f/u for quit 4 with Smoking Cessation.  Voicemail with contact information given.

## 2025-02-20 ENCOUNTER — TELEPHONE (OUTPATIENT)
Dept: SMOKING CESSATION | Facility: CLINIC | Age: 60
End: 2025-02-20
Payer: MEDICARE

## 2025-02-20 NOTE — TELEPHONE ENCOUNTER
3rd attempt, patient called in regards to 6 month f/u for quit 4 with Smoking Cessation.  Voicemail with contact information given.

## 2025-02-27 ENCOUNTER — CLINICAL SUPPORT (OUTPATIENT)
Dept: OBSTETRICS AND GYNECOLOGY | Facility: CLINIC | Age: 60
End: 2025-02-27
Payer: MEDICARE

## 2025-02-27 DIAGNOSIS — Z79.4 TYPE 2 DIABETES MELLITUS WITH DIABETIC POLYNEUROPATHY, WITH LONG-TERM CURRENT USE OF INSULIN: ICD-10-CM

## 2025-02-27 DIAGNOSIS — I10 BENIGN ESSENTIAL HTN: ICD-10-CM

## 2025-02-27 DIAGNOSIS — E03.8 OTHER SPECIFIED HYPOTHYROIDISM: ICD-10-CM

## 2025-02-27 DIAGNOSIS — E11.42 TYPE 2 DIABETES MELLITUS WITH DIABETIC POLYNEUROPATHY, WITH LONG-TERM CURRENT USE OF INSULIN: ICD-10-CM

## 2025-02-27 DIAGNOSIS — Z01.89 ROUTINE LAB DRAW: Primary | ICD-10-CM

## 2025-02-27 DIAGNOSIS — E78.2 MIXED HYPERLIPIDEMIA: Primary | ICD-10-CM

## 2025-02-27 LAB
ALBUMIN SERPL BCP-MCNC: 3.1 G/DL (ref 3.4–5)
ALBUMIN/GLOBULIN RATIO: 0.8 RATIO (ref 1.1–1.8)
ALP SERPL-CCNC: 167 U/L (ref 45–117)
ALT SERPL W P-5'-P-CCNC: 20 U/L (ref 13–56)
ANION GAP SERPL CALC-SCNC: 5 MMOL/L (ref 3–11)
AST SERPL-CCNC: 15 U/L (ref 15–37)
BILIRUB SERPL-MCNC: 0.4 MG/DL (ref 0.2–1)
BUN SERPL-MCNC: 18 MG/DL (ref 7–18)
BUN/CREAT SERPL: 14.75 RATIO
CALCIUM SERPL-MCNC: 8.9 MG/DL (ref 8.5–10.1)
CHLORIDE SERPL-SCNC: 95 MMOL/L (ref 98–107)
CHOLEST SERPL-MSCNC: 137 MG/DL
CO2 SERPL-SCNC: 32 MMOL/L (ref 21–32)
CREAT SERPL-MCNC: 1.22 MG/DL (ref 0.55–1.02)
GFR ESTIMATION: 51
GLOBULIN: 3.9 G/DL (ref 2.3–3.5)
GLUCOSE SERPL-MCNC: 241 MG/DL (ref 74–106)
HBA1C MFR BLD: 11.6 % (ref 4.2–6.3)
HDL/CHOLESTEROL RATIO: 3.3 RATIO
HDLC SERPL-MCNC: 42 MG/DL
LDLC SERPL CALC-MCNC: 43.6 MG/DL
POTASSIUM SERPL-SCNC: 4.2 MMOL/L (ref 3.5–5.1)
PROT SERPL-MCNC: 7 G/DL (ref 6.4–8.2)
SODIUM BLD-SCNC: 132 MMOL/L (ref 131–143)
T4 FREE SP9 P CHAL SERPL-SCNC: 1.38 NG/DL (ref 0.76–1.46)
TRIGL SERPL-MCNC: 257 MG/DL (ref 0–149)
TSH SERPL DL<=0.005 MIU/L-ACNC: 1.2 UIU/ML (ref 0.36–3.74)
VLDL CHOLESTEROL: 51 MG/DL

## 2025-03-03 ENCOUNTER — TELEPHONE (OUTPATIENT)
Dept: SMOKING CESSATION | Facility: CLINIC | Age: 60
End: 2025-03-03
Payer: MEDICARE

## 2025-03-03 NOTE — TELEPHONE ENCOUNTER
4th attempt, patient called in regards to her voicemail that was left for me about rescheduling for Smoking Cessation.  Voicemail with contact information given.

## 2025-03-05 ENCOUNTER — OFFICE VISIT (OUTPATIENT)
Dept: PRIMARY CARE CLINIC | Facility: CLINIC | Age: 60
End: 2025-03-05
Payer: MEDICARE

## 2025-03-05 ENCOUNTER — CLINICAL SUPPORT (OUTPATIENT)
Dept: SMOKING CESSATION | Facility: CLINIC | Age: 60
End: 2025-03-05
Payer: COMMERCIAL

## 2025-03-05 VITALS
DIASTOLIC BLOOD PRESSURE: 60 MMHG | SYSTOLIC BLOOD PRESSURE: 111 MMHG | BODY MASS INDEX: 44.65 KG/M2 | HEIGHT: 65 IN | HEART RATE: 97 BPM | TEMPERATURE: 98 F | WEIGHT: 268 LBS | RESPIRATION RATE: 18 BRPM | OXYGEN SATURATION: 95 %

## 2025-03-05 DIAGNOSIS — K21.00 GASTROESOPHAGEAL REFLUX DISEASE WITH ESOPHAGITIS WITHOUT HEMORRHAGE: ICD-10-CM

## 2025-03-05 DIAGNOSIS — Z00.00 ANNUAL PHYSICAL EXAM: ICD-10-CM

## 2025-03-05 DIAGNOSIS — E11.42 TYPE 2 DIABETES MELLITUS WITH DIABETIC POLYNEUROPATHY, WITH LONG-TERM CURRENT USE OF INSULIN: ICD-10-CM

## 2025-03-05 DIAGNOSIS — F41.9 ANXIETY AND DEPRESSION: ICD-10-CM

## 2025-03-05 DIAGNOSIS — J43.8 OTHER EMPHYSEMA: ICD-10-CM

## 2025-03-05 DIAGNOSIS — Z12.11 SCREENING FOR COLORECTAL CANCER: ICD-10-CM

## 2025-03-05 DIAGNOSIS — Z79.4 TYPE 2 DIABETES MELLITUS WITH DIABETIC POLYNEUROPATHY, WITH LONG-TERM CURRENT USE OF INSULIN: ICD-10-CM

## 2025-03-05 DIAGNOSIS — F17.200 NICOTINE DEPENDENCE: Primary | ICD-10-CM

## 2025-03-05 DIAGNOSIS — E78.2 MIXED HYPERLIPIDEMIA: ICD-10-CM

## 2025-03-05 DIAGNOSIS — N18.31 CHRONIC KIDNEY DISEASE, STAGE 3A: ICD-10-CM

## 2025-03-05 DIAGNOSIS — F32.A ANXIETY AND DEPRESSION: ICD-10-CM

## 2025-03-05 DIAGNOSIS — E55.9 VITAMIN D DEFICIENCY: ICD-10-CM

## 2025-03-05 DIAGNOSIS — I10 BENIGN ESSENTIAL HTN: Primary | ICD-10-CM

## 2025-03-05 DIAGNOSIS — E03.8 OTHER SPECIFIED HYPOTHYROIDISM: ICD-10-CM

## 2025-03-05 DIAGNOSIS — Z12.12 SCREENING FOR COLORECTAL CANCER: ICD-10-CM

## 2025-03-05 DIAGNOSIS — R35.0 URINE FREQUENCY: ICD-10-CM

## 2025-03-05 DIAGNOSIS — E66.01 MORBID OBESITY WITH BMI OF 40.0-44.9, ADULT: ICD-10-CM

## 2025-03-05 PROCEDURE — 99407 BEHAV CHNG SMOKING > 10 MIN: CPT | Mod: S$GLB,,,

## 2025-03-05 PROCEDURE — 99999 PR PBB SHADOW E&M-EST. PATIENT-LVL I: CPT | Mod: PBBFAC,,,

## 2025-03-05 RX ORDER — CLINDAMYCIN HYDROCHLORIDE 300 MG/1
300 CAPSULE ORAL 3 TIMES DAILY
COMMUNITY
Start: 2025-03-03

## 2025-03-05 RX ORDER — TIRZEPATIDE 7.5 MG/.5ML
INJECTION, SOLUTION SUBCUTANEOUS
COMMUNITY
Start: 2025-03-04

## 2025-03-05 NOTE — PATIENT INSTRUCTIONS
"RTC in 3 months for F/U or sooner if needed.    Keep appts with specialists as scheduled.    Patient Education     Controlling your blood pressure through lifestyle   The Basics   Written by the doctors and editors at [facility]   What does my lifestyle have to do with my blood pressure? -- The things you do and the foods you eat have a big effect on your blood pressure and your overall health. Following the right lifestyle can:   Lower your blood pressure, or keep you from getting high blood pressure in the first place   Reduce your need for blood pressure medicines   Make medicines for high blood pressure work better, if you do take them   Lower the chances that you'll have a heart attack or stroke, or develop kidney disease  Which lifestyle choices will help lower my blood pressure? -- You can:   Lose weight (if you are overweight).   Choose a diet rich in fruits, vegetables, and low-fat dairy products, and low in meats, sweets, and refined grains.   Eat less salt (sodium).   Do something active for at least 30 minutes a day on most days of the week.   Limit the amount of alcohol you drink.  If you have high blood pressure, it's also very important to quit smoking (if you smoke). Quitting smoking might not bring your blood pressure down. But it will lower the chances that you'll have a heart attack or stroke, and it will help you feel better and live longer.  Start low, and go slow -- The changes listed above might sound like a lot, but don't worry. You don't have to change everything all at once. The key to improving your lifestyle is to "start low, and go slow." Choose 1 small, specific thing to change, and try doing it for a while. If it works for you, keep doing it until it becomes a habit. If it doesn't, don't give up. Choose something else to change, and see how that goes.  Let's say, for example, that you would like to improve your diet. If you're the type of person who eats cheeseburgers and French fries " "often, you can't switch to eating just salads from one day to the next. When people try to make changes like that, they often fail. Then, they feel frustrated and tend to give up. So instead of trying to change everything about your diet in 1 day, change 1 or 2 small things about your diet and give yourself time to get used to those changes. For instance, keep the cheeseburger but give up the French fries. Or eat the same things, but cut your portions in half.  As you find things that you are able to change and stick with, keep adding new changes. In time, you will see that you can actually change a lot. You just have to get used to the changes slowly.  Lose weight -- When people think about losing weight, they sometimes make it more complicated than it really is. To lose weight, you have to either eat less or move more. If you do both of those things, it's even better. But there is no single weight loss diet or activity that's better than any other. When it comes to weight loss, the most effective plan is the one that you'll stick with.  Improve your diet -- There is no single diet that is right for everyone. But in general, a healthy diet can include:   Lots of fruits, vegetables, and whole grains   Some beans, peas, lentils, chickpeas, and similar foods   Some nuts, such as walnuts, almonds, and peanuts   Fat-free or low-fat milk and milk products   Some fish  To have a healthy diet, it's also important to limit or avoid sugar, sweets, meats, and refined grains. (Refined grains are found in white bread, white rice, most forms of pasta, and most packaged "snack" foods.)  Reduce salt -- Many people think that eating a low-sodium diet means avoiding the salt shaker and not adding salt when cooking. The truth is, not adding salt at the table or when you cook will only help a little. Almost all of the sodium you eat is already in the food you buy at the grocery store or at restaurants (figure 1).  The most important " "thing you can do to cut down on sodium is to eat less processed food. That means that you should avoid most foods that are sold in cans, boxes, jars, and bags. You should also eat in restaurants less often.  To reduce the amount of sodium you eat, buy fresh or fresh-frozen fruits, vegetables, and meats. (Fresh-frozen foods have had nothing added to them before freezing.) Then, you can make meals at home, from scratch, with these ingredients.  As with the other changes, don't try to cut out salt all at once. Instead, choose 1 or 2 foods that have a lot of sodium and try to replace them with low-sodium choices. When you get used to those low-sodium options, find another food or 2 to change. Then, keep going, until all of the foods you eat are sodium-free or low in sodium.  Become more active -- If you want to be more active, you don't have to go to the gym or get all sweaty. It is possible to increase your activity level while doing everyday things you enjoy. Walking, gardening, and dancing are just a few of the things that you might try. As with all the other changes, the key is not to do too much too fast. If you don't do any activity now, start by walking for just a few minutes every other day. Do that for a few weeks. If you stick with it, try doing it for longer. But if you find that you don't like walking, try a different activity.  Drink less alcohol -- If you are female, do not have more than 1 "standard drink" of alcohol a day. If you are male, do not have more than 2. A "standard drink" is:   A can or bottle that has 12 ounces of beer   A glass that has 5 ounces of wine   A shot that has 1.5 ounces of hard alcohol  Where should I start? -- If you want to improve your lifestyle, start by making the changes that you think would be easiest for you. If you used to exercise and just got out of the habit, maybe it would be easy for you to start exercising again. Or if you actually like cooking meals from scratch, " "maybe the first thing you should focus on is eating home-cooked meals that are low in sodium.  Whatever you tackle first, choose specific, realistic goals, and give yourself a deadline. For example, do not decide that you are going to "exercise more." Instead, decide that you are going to walk for 10 minutes on Monday, Wednesday, and Friday, and that you are going to do this for the next 2 weeks.  When lifestyle changes are too general, people have a hard time following through.  Now go. You can do it!  All topics are updated as new evidence becomes available and our peer review process is complete.  This topic retrieved from [facility] on: May 30, 2024.  Topic 90906 Version 15.0  Release: 32.5.3 - C32.150  © 2024 UpToDate, Inc. and/or its affiliates. All rights reserved.  figure 1: Sources of sodium in your diet     Original data from: Juan TRAN, Roberta BOWERS. Reducing population salt intake worldwide: from evidence to implementation. Prog Cardiovasc Dis 2010; 52:363.  Graphic 32789 Version 2.0  Consumer Information Use and Disclaimer   Disclaimer: This generalized information is a limited summary of diagnosis, treatment, and/or medication information. It is not meant to be comprehensive and should be used as a tool to help the user understand and/or assess potential diagnostic and treatment options. It does NOT include all information about conditions, treatments, medications, side effects, or risks that may apply to a specific patient. It is not intended to be medical advice or a substitute for the medical advice, diagnosis, or treatment of a health care provider based on the health care provider's examination and assessment of a patient's specific and unique circumstances. Patients must speak with a health care provider for complete information about their health, medical questions, and treatment options, including any risks or benefits regarding use of medications. This information does not endorse any treatments or " "medications as safe, effective, or approved for treating a specific patient. UpToDate, Inc. and its affiliates disclaim any warranty or liability relating to this information or the use thereof.The use of this information is governed by the Terms of Use, available at https://www.[employer].com/en/know/clinical-effectiveness-terms. 2024© UpToDate, Inc. and its affiliates and/or licensors. All rights reserved.  Copyright   © 2024 UpToDate, Inc. and/or its affiliates. All rights reserved.    Patient Education     Diabetes and diet   The Basics   Written by the doctors and editors at [employer]   Why is diet important if I have diabetes? -- Diet is important because it is part of diabetes treatment. Many people need to change what they eat and how much they eat to help treat their diabetes. It is important for people to treat their diabetes so they:   Keep their blood sugar at goal   Prevent long-term problems, such as heart or kidney problems, that can happen in people with diabetes  Changing your diet can also help treat obesity, high blood pressure, and high cholesterol. These conditions can affect people with diabetes and can lead to future problems, such as heart attacks or strokes.  Who will help me change my diet? -- Your doctor or nurse will work with you to make a food plan to change your diet. They might also recommend that you work with a dietitian. A dietitian is an expert on food and eating.  Do I need to eat at the same times every day? -- When and how often you should eat depends, in part, on the diabetes medicines you take. For example:   People who take about the same amount of insulin at the same time each day (called a "fixed regimen") should eat meals at the same times. This is also true for people who take pills that increase insulin levels, such as sulfonylureas. Eating meals at the same time every day helps prevent low blood sugar.   People who adjust the dose and timing of their insulin each day " "(called a "flexible regimen") do not always have to eat meals at the same time. That's because they can time their insulin dose for before they plan to eat, and also adjust the dose for how much they plan to eat.   People who take medicines that don't usually cause low blood sugar, such as metformin, don't have to eat meals at the same time every day.  What do I need to think about when planning what to eat? -- Our bodies break down the food we eat into small pieces called carbohydrates, proteins, and fats.  When planning what to eat, people with diabetes need to think about:   Carbohydrates (or "carbs") - These are sugars the body uses for energy. They can raise your blood sugar level. Your doctor, nurse, or dietitian will tell you how many carbs you should eat at each meal or snack. Foods that have carbs include:   Bread, pasta, and rice   Vegetables and fruits   Dairy foods   Foods and drinks with added sugar  It is best to get your carbs from fruits, vegetables, whole grains, and low-fat milk. It is best to avoid drinks with added sugar, like soda, juices, and sports drinks.   Protein - Your doctor, nurse, or dietitian will tell you how much protein you should eat each day. It is best to eat lean meats, fish, eggs, beans, peas, soy products, nuts, and seeds. Avoid or limit processed meats like feliciano, hot dogs, and sausages.   Fats - The type of fat you eat is more important than the amount of fat. "Saturated" and "trans" fats can increase your risk for heart problems, like a heart attack.   Foods that have saturated fats include meat, butter, cheese, and ice cream.   Foods that have trans fats include processed food with "partially hydrogenated oils" on the ingredient list. This might include fried foods, store-bought cookies, muffins, pies, and cakes.  "Monounsaturated" and "polyunsaturated" fats are better for you. Foods with these types of fat include fish, avocado, olive oil, and nuts.   Calories - You need " to eat a certain amount of calories each day to keep your weight the same. If you have excess body weight and want to lose weight, you need to eat fewer calories each day.   Fiber - Eating foods with a lot of fiber can help manage your blood sugar level. Foods that have a lot of fiber include apples, green beans, peas, beans, lentils, nuts, oatmeal, and whole grains.   Salt - People who have high blood pressure should not eat foods that contain a lot of salt (also called sodium). People with high blood pressure should also eat healthy foods, such as fruits, vegetables, and low-fat dairy foods.   Alcohol and sugary drinks - Having more than 1 alcoholic drink (for females) or 2 drinks (for males) a day can raise blood sugar levels. Also, sugary drinks like fruit juice or soda can raise blood sugar levels.  How can I lose weight? -- You can:   Exercise - Try to get at least 30 minutes of physical activity a day, most days of the week. Even gentle exercise, like walking, is good for your health. Some people with diabetes need to change their medicine dose before they exercise. They might also need to check their blood sugar levels before and after exercising.   Eat fewer calories - Your doctor, nurse, or dietitian can tell you how many calories you should eat each day to lose weight.  If you are worried about your weight, size, or shape, talk with your doctor, nurse, or dietitian. They can help you make changes to improve your health.  Can I eat the same foods as my family? -- Yes. You do not need to eat special foods if you have diabetes. You and your family can eat the same foods. Changing your diet is mostly about eating healthy foods in healthy amounts.  What are the other parts of diabetes treatment? -- Besides changing your diet, the other parts of diabetes treatment are:   Exercise   Medicines  Some people with diabetes need to learn how to match their diet and exercise with their medicine dose. For example,  people who use insulin might need to choose the dose of insulin they give themselves. To choose their dose, they need to think about:   What they plan to eat at the next meal   How much exercise they plan to do   What their blood sugar level is  If the diet and exercise do not match the medicine dose, a person's blood sugar level can get too low or too high. Blood sugar levels that are too low or too high can cause problems.  All topics are updated as new evidence becomes available and our peer review process is complete.  This topic retrieved from [facility] on: May 30, 2024.  Topic 50666 Version 13.0  Release: 32.5.3 - C32.150  © 2024 UpToDate, Inc. and/or its affiliates. All rights reserved.  Consumer Information Use and Disclaimer   Disclaimer: This generalized information is a limited summary of diagnosis, treatment, and/or medication information. It is not meant to be comprehensive and should be used as a tool to help the user understand and/or assess potential diagnostic and treatment options. It does NOT include all information about conditions, treatments, medications, side effects, or risks that may apply to a specific patient. It is not intended to be medical advice or a substitute for the medical advice, diagnosis, or treatment of a health care provider based on the health care provider's examination and assessment of a patient's specific and unique circumstances. Patients must speak with a health care provider for complete information about their health, medical questions, and treatment options, including any risks or benefits regarding use of medications. This information does not endorse any treatments or medications as safe, effective, or approved for treating a specific patient. UpToDate, Inc. and its affiliates disclaim any warranty or liability relating to this information or the use thereof.The use of this information is governed by the Terms of Use, available at  "https://www.[employer]wer.com/en/know/clinical-effectiveness-terms. 2024© UpToDate, Inc. and its affiliates and/or licensors. All rights reserved.  Copyright   © 2024 UpToDate, Inc. and/or its affiliates. All rights reserved.    Patient Education       Low Cholesterol, Saturated Fat, and Trans Fat Diet   About this topic   Cholesterol, saturated fat, and trans fat are in many foods. These may raise your blood cholesterol levels. If your cholesterol is too high, this can cause health problems in your heart, liver, kidneys, and even your eyes. The key to lowering your risk of heart problems is to lower your bad fat intake.  Saturated fats and trans fats are the bad fats. These fats clog your arteries and raise your bad cholesterol. Saturated fats and trans fats are solid fats at room temperature. Saturated fats are animal fats. Trans fats are manmade fats. They add flavor to a lot of packaged foods. Staying away from saturated and trans fats will help your heart.  When you do eat foods with fat, make sure they have the good fats. Monounsaturated and polyunsaturated fats are good fats. These fats help raise your good cholesterol and protect your heart.  General   How to Lower Fat and Cholesterol in Your Diet   Read the labels of the foods you buy from the market to find out how much fat is present. Under 5% of total fat on a label means it is "low fat". Over 20% of total fat on a label means it is high fat.  Eat high fiber foods, like soluble fiber. This type of fiber helps lower cholesterol in the body. Choose oatmeal, fruits (like apples), beans, and nuts to get the most soluble fiber.  Eat foods high in omega-3 fatty acids like gopal seeds, walnuts, salmon, tuna, trout, herring, flaxseed, and soybeans. These foods help keep the heart healthy.  Limit your bad fat and oil intake.  Stay away from butter, stick margarine, shortening, lard, and palm and coconut oil. Pick plant-based spreads instead.  Limit mayonnaise, " salad dressings, gravies, and sauces, unless it is made from low-fat ingredients.  Limit chocolate.  Do not eat high-fat processed foods like hot dogs, feliciano, sausage, ham and other luncheon meats high in fat, and some frozen foods. Pick fish, chicken, turkey, and lean meats instead.  Eat more dried beans, lentils, and tofu to get your protein.  Do not eat organ meats, like liver.  Choose nonfat or low-fat milk, yogurt, and cheese.  Use light or fat-free cream cheese and sour cream.  Eat lots of fruits and vegetables.  Pick whole grain breads, cereals, pastas, and rice.  Do not eat snacks that are high in fats like granola, cookies, pies, pastries, doughnuts, and croissants.  Stay away from deep fried foods.  Help When Cooking   Remove the fat portion of meats and the skin from poultry before cooking.  Bake, broil, grill, poach, or roast poultry, fish, and lean meats.  Drain and throw away the fat that drains out of meat as you cook it.  Try to add little or no fat to foods.  Use olive or canola oil for cooking or baking.  Steam your vegetables.  Use herbs or no-oil marinades to flavor foods.         Who should use this diet?   This diet is for people who are at high risk of getting health problems like heart disease, high blood pressure, diabetes, and others. This diet is also good for all people to follow to keep your heart healthy.  What foods are good to eat?   Foods with good fats are:  Canola, peanut, and olive oil  Safflower, soybean, and corn oil  Walnuts, almonds, cashews, and peanuts  Pumpkin and sunflower seeds  [place] and tuna  Tofu  Soymilk  Avocado  What foods should be limited or avoided?   Stay away from these types of foods that have saturated fats:  Whole fat dairy products like cheese, ice cream, whole milk, and cream  Palm and coconut oils  High-fat meats like beef, lamb, poultry with the skin, feliciano, and sausage  Butter and lard  Stay away from these types of foods that may have trans  fat:  Cookies, cakes, candy, doughnuts, baked goods, muffins, pizza dough, and pie crusts that are packaged  Fried foods  Frozen dinners  Chips and crackers  Microwave popcorn  Stick margarine and vegetable shortenings  Helpful tips   To help stay away from saturated fat:  Pick lean cuts of meat  Take the skin off chicken and turkey or pick skinless  Pick low-fat cheese, milk, and ice cream  Use liquid oils when cooking and baking, such as olive oil and canola oil  To help stay away from trans fat:  Look at your labels. Choose foods with 0% trans fat. Read the ingredient list. Avoid foods with partially hydrogenated oil in the ingredient list. This means there is trans fat in the product.  Where can I learn more?   American Heart Association   http://[email or web address]/Know-Your-Fats_[email or web address]_305628_[email or web address]   Last Reviewed Date   2021-10-05  Consumer Information Use and Disclaimer   This information is not specific medical advice and does not replace information you receive from your health care provider. This is only a brief summary of general information. It does NOT include all information about conditions, illnesses, injuries, tests, procedures, treatments, therapies, discharge instructions or life-style choices that may apply to you. You must talk with your health care provider for complete information about your health and treatment options. This information should not be used to decide whether or not to accept your health care providers advice, instructions or recommendations. Only your health care provider has the knowledge and training to provide advice that is right for you.   Copyright   Copyright © 2021 UpToDate, Inc. and its affiliates and/or licensors. All rights reserved.

## 2025-03-05 NOTE — PROGRESS NOTES
Subjective:       Patient ID: Anca Grace is a 59 y.o. female.    Chief Complaint: Follow-up (Pt c/o knee pain)    HPI: Anca is a 59 y.o. female who presents for F/U.    Ms. Grace c/o bilateral knee pain. Ms. Grace is now seeing Dr. Sellers for chronic bilateral knee arthritis and she plans to have knee surgery soon but she has been referred to Dr. Luna for cardiac evaluation prior to her having surgery and to check her cirulation due to her having issues with leg pain and ulcers in the past. Both of her legs have become infected again due to her blood sugar being elevated so she will be seeing a Wound Dr. Rocha in [place] tomorrow. She was given Clindamycin 300 mg TID to help with the infection.    Labs recently drawn, A1C increased to 11.6 and fasting glucose is 241. Kidney function still decreased but increased slightly to 51 from 46. All other lab results within normal range. She reports she was unable to get her Mounjaro injections for two months due to her insurance being inactive but her insurance is active again so Walt David, NP with Endocrinology ordered more Mounjaro on Monday at her visit with him and she was given samples of the Mounjaro in the office until her Mounjaro comes in. He increased the levothyroxine to 125 mcg daily and he also started her on Lomaira 8 mg TID with meals to help with weight loss. She is also followed by Endocrinology for her Diabetes.    DM II -  Chronic and uncontrolled on meds. Followed by Walt David for Endocrinology for her DM Type 2 and Hypothyroidism. She still takes Toujeo 160 units daily, Mounjaro weekly and takes Xigduo daily. She has Neuropathy which she takes Lyrica for it and it helps. [facility] and [facility] was well. She also has diabetic foot exams annually by Dr. Infante in [place].    Hyperlipidemia - controlled with a STATIN. Denies se/ar. Say she has been trying to eat  healthier.    Hypothyroidism - levothyroxine increased to 125 mcg daily by Endocrinology at her last visit. Denies se/ar to medication.    Ms. Grace suffers with pain to multiple joints and her back and neck which is chronic. The Elavil was increased to 50 mg daily, flexeril, cymbalta and still on Lyrica as well. She is also followed by Dr. Gallegos for lower back pain and he does the back injections.    Mammogram UTD. Hysterectomy done in 2012. Takes estradiol daily.    Patient had a Colonoscopy done at [facility] by Dr. Valencia in 2018 with 3 polyps found and removed, told to repeat in 3 years so she had it done in June 2023 with one polyp found but due to her not being fully cleaned out so she was told to repeat in 1 year, will refer back to Dr. Valencia today.    Current smoker, she has cut down to smoking 1-2 cigarettes daily after smoking 1 ppd and has smoked for 15 years. She is still doing smoking cessation program which she is using Chantix and it is helping.                  Past Medical History:   Diagnosis Date    Diabetes mellitus, type 2     GERD (gastroesophageal reflux disease)     Hyperlipidemia     Hypertension        Past Surgical History:   Procedure Laterality Date    CARPAL TUNNEL RELEASE      HYSTERECTOMY         Family History   Problem Relation Name Age of Onset    Thyroiditis Mother      Heart disease Father      Graves' disease Sister      Heart disease Maternal Grandfather      Cancer Paternal Grandfather Lico Verdin        Social History[1]    Problem List[2]    Immunization History   Administered Date(s) Administered    COVID-19, MRNA, LN-S, PF (MODERNA FULL 0.5 ML DOSE) 10/30/2021, 11/27/2021    DTaP 1966, 01/18/1967, 03/08/1967, 04/10/1968, 03/03/1971, 08/01/1977    IPV 1966, 01/18/1967, 03/08/1967, 04/10/1968, 04/07/1978    Influenza - Quadrivalent - PF *Preferred* (6 months and older) 10/14/2021    Measles 11/07/1967    Mumps 11/13/1968    Rubella 10/21/1970    Tdap  "04/13/1988, 08/06/2004           Review of Systems   Constitutional:  Negative for activity change, appetite change, chills, diaphoresis, fatigue and fever.   HENT:  Negative for congestion, ear pain, sinus pain, tinnitus and trouble swallowing.    Eyes:  Negative for visual disturbance.   Respiratory:  Negative for cough, chest tightness, shortness of breath and wheezing.    Cardiovascular:  Negative for chest pain, palpitations and leg swelling.   Gastrointestinal:  Negative for abdominal distention, abdominal pain, blood in stool, constipation, diarrhea, nausea and vomiting.   Endocrine: Negative for polydipsia, polyphagia and polyuria.   Genitourinary:  Negative for dysuria, frequency and hematuria.   Musculoskeletal:  Positive for arthralgias (knees). Negative for back pain, gait problem and neck pain.   Skin:  Positive for rash (bilateral front, lower legs) and wound (shins). Negative for color change.   Neurological:  Negative for dizziness, syncope, weakness, light-headedness, numbness and headaches.   Psychiatric/Behavioral:  Negative for behavioral problems, confusion and dysphoric mood. The patient is not nervous/anxious.      Objective:     Vitals:    03/05/25 1430   BP: 111/60   BP Location: Left arm   Patient Position: Sitting   Pulse: 97   Resp: 18   Temp: 98.3 °F (36.8 °C)   TempSrc: Oral   SpO2: 95%   Weight: 121.6 kg (268 lb)   Height: 5' 5" (1.651 m)       Physical Exam  Vitals and nursing note reviewed.   Constitutional:       General: She is not in acute distress.     Appearance: Normal appearance. She is not diaphoretic.   HENT:      Head: Normocephalic and atraumatic.      Mouth/Throat:      Mouth: Mucous membranes are moist.      Pharynx: Oropharynx is clear.   Eyes:      Extraocular Movements: Extraocular movements intact.      Conjunctiva/sclera: Conjunctivae normal.   Cardiovascular:      Rate and Rhythm: Normal rate and regular rhythm.      Pulses: Normal pulses.      Heart sounds: Normal " heart sounds.   Pulmonary:      Effort: Pulmonary effort is normal. No tachypnea or bradypnea.      Breath sounds: Normal breath sounds. No stridor. No wheezing or rales.   Abdominal:      General: There is no distension.      Tenderness: There is no abdominal tenderness.   Musculoskeletal:         General: Swelling (knees) and tenderness (knees and multiple joints) present. Normal range of motion.      Cervical back: Normal range of motion.      Right lower leg: No edema.      Left lower leg: No edema.   Lymphadenopathy:      Cervical: No cervical adenopathy.   Skin:     General: Skin is warm and dry.      Capillary Refill: Capillary refill takes less than 2 seconds.      Findings: Erythema (shins) and rash (shins) present.   Neurological:      General: No focal deficit present.      Mental Status: She is alert and oriented to person, place, and time.   Psychiatric:         Mood and Affect: Mood and affect normal.         Behavior: Behavior normal. Behavior is cooperative.         Orders Only on 02/27/2025   Component Date Value Ref Range Status    Hemoglobin A1C 02/27/2025 11.6 (H)  4.2 - 6.3 % Final    Sodium 02/27/2025 132  131 - 143 mmol/L Final    Potassium 02/27/2025 4.2  3.5 - 5.1 mmol/L Final    Chloride 02/27/2025 95 (L)  98 - 107 mmol/L Final    CO2 02/27/2025 32  21 - 32 mmol/L Final    Anion Gap 02/27/2025 5.0  3.0 - 11.0 mmol/L Final    BUN 02/27/2025 18.0  7.0 - 18.0 mg/dL Final    Creatinine 02/27/2025 1.22 (H)  0.55 - 1.02 mg/dL Final    GFR ESTIMATION 02/27/2025 51 (L)  >60 Final    BUN/Creatinine Ratio 02/27/2025 14.75  Ratio Final    Glucose 02/27/2025 241 (H)  74 - 106 mg/dL Final    Calcium 02/27/2025 8.9  8.5 - 10.1 mg/dL Final    Total Bilirubin 02/27/2025 0.4  0.2 - 1.0 mg/dL Final    AST 02/27/2025 15  15 - 37 U/L Final    ALT 02/27/2025 20  13 - 56 U/L Final    Total Protein 02/27/2025 7.0  6.4 - 8.2 g/dL Final    Albumin 02/27/2025 3.1 (L)  3.4 - 5.0 g/dL Final    Globulin 02/27/2025 3.9  (H)  2.3 - 3.5 g/dL Final    Albumin/Globulin Ratio 02/27/2025 0.8 (L)  1.1 - 1.8 Ratio Final    Alkaline Phosphatase 02/27/2025 167 (H)  45 - 117 U/L Final    Cholesterol 02/27/2025 137  <200 mg/dL Final    Triglycerides 02/27/2025 257 (H)  0 - 149 mg/dL Final    HDL 02/27/2025 42  >39 mg/dL Final    LDL Cholesterol 02/27/2025 43.6  mg/dL Final    VLDL 02/27/2025 51  mg/dL Final    HDL/Cholesterol Ratio 02/27/2025 3.3  Ratio Final    Free T4 02/27/2025 1.38  0.76 - 1.46 ng/dL Final    TSH 02/27/2025 1.200  0.358 - 3.74 uIU/mL Final   Office Visit on 11/18/2024   Component Date Value Ref Range Status    SARS Coronavirus 2 Antigen 11/18/2024 Negative  Negative Final     Acceptable 11/18/2024 Yes   Final    Rapid Strep A Screen 11/18/2024 Negative  Negative Final     Acceptable 11/18/2024 Yes   Final   Orders Only on 10/31/2024   Component Date Value Ref Range Status    Hemoglobin A1C 10/31/2024 6.5 (H)  4.2 - 6.3 % Final   Orders Only on 10/31/2024   Component Date Value Ref Range Status    Color, UA 10/31/2024 Colorless  Yellow Final    Appearance, UA 10/31/2024 Clear  Clear Final    pH, UA 10/31/2024 6.0  5.0 - 8.0 pH Final    Specific Gravity, UA 10/31/2024 1.007  1.005 - 1.030 Final    Protein, UA 10/31/2024 Negative  Negative mg/dL Final    Glucose, UA 10/31/2024 >1000  Normal mg/dL Final    Ketones, UA 10/31/2024 Negative  Negative mg/dL Final    Occult Blood UA 10/31/2024 Negative  Negative /uL Final    Nitrite, UA 10/31/2024 Negative  Negative Final    Bilirubin (UA) 10/31/2024 Negative  Negative mg/dL Final    Urobilinogen, UA 10/31/2024 Normal  Normal mg/dL Final    Leukocytes, UA 10/31/2024 25 (A)  Negative /uL Final   Orders Only on 10/31/2024   Component Date Value Ref Range Status    Sodium 10/31/2024 138  131 - 143 mmol/L Final    Potassium 10/31/2024 4.6  3.5 - 5.1 mmol/L Final    Chloride 10/31/2024 101  98 - 107 mmol/L Final    CO2 10/31/2024 29  21 - 32 mmol/L Final     Anion Gap 10/31/2024 8.0  3.0 - 11.0 mmol/L Final    BUN 10/31/2024 25.0 (H)  7.0 - 18.0 mg/dL Final    Creatinine 10/31/2024 1.35 (H)  0.55 - 1.02 mg/dL Final    GFR ESTIMATION 10/31/2024 46 (L)  >60 Final    BUN/Creatinine Ratio 10/31/2024 18.51  Ratio Final    Glucose 10/31/2024 197 (H)  74 - 106 mg/dL Final    Calcium 10/31/2024 9.4  8.5 - 10.1 mg/dL Final    Total Bilirubin 10/31/2024 0.4  0.2 - 1.0 mg/dL Final    AST 10/31/2024 15  15 - 37 U/L Final    ALT 10/31/2024 25  13 - 56 U/L Final    Total Protein 10/31/2024 6.9  6.4 - 8.2 g/dL Final    Albumin 10/31/2024 3.6  3.4 - 5.0 g/dL Final    Globulin 10/31/2024 3.3  2.3 - 3.5 g/dL Final    Albumin/Globulin Ratio 10/31/2024 1.1  1.1 - 1.8 Ratio Final    Alkaline Phosphatase 10/31/2024 114  45 - 117 U/L Final    Free T4 10/31/2024 1.33  0.76 - 1.46 ng/dL Final    TSH 10/31/2024 2.130  0.358 - 3.74 uIU/mL Final         Assessment:      1. Benign essential HTN    2. Type 2 diabetes mellitus with diabetic polyneuropathy, with long-term current use of insulin    3. Morbid obesity with BMI of 40.0-44.9, adult    4. Chronic kidney disease, stage 3a    5. Other emphysema    6. Mixed hyperlipidemia    7. Other specified hypothyroidism    8. Gastroesophageal reflux disease with esophagitis without hemorrhage    9. Anxiety and depression    10. Urine frequency    11. Vitamin D deficiency    12. Screening for colorectal cancer    13. Annual physical exam          Plan:     Benign essential HTN  -     CBC Auto Differential; Future; Expected date: 03/25/2025  -     Comprehensive Metabolic Panel; Future; Expected date: 03/25/2025  -     Lipid Panel; Future; Expected date: 03/25/2025  -     TSH; Future; Expected date: 03/25/2025    Type 2 diabetes mellitus with diabetic polyneuropathy, with long-term current use of insulin  -     Comprehensive Metabolic Panel; Future; Expected date: 03/25/2025  -     Hemoglobin A1C; Future; Expected date: 03/25/2025  -      Microalbumin/Creatinine Ratio, Urine; Future; Expected date: 03/25/2025    Morbid obesity with BMI of 40.0-44.9, adult    Chronic kidney disease, stage 3a  -     Comprehensive Metabolic Panel; Future; Expected date: 03/25/2025    Other emphysema    Mixed hyperlipidemia  -     Lipid Panel; Future; Expected date: 03/25/2025    Other specified hypothyroidism  -     T4, Free; Future; Expected date: 03/25/2025  -     TSH; Future; Expected date: 03/25/2025    Gastroesophageal reflux disease with esophagitis without hemorrhage    Anxiety and depression    Urine frequency  -     Urinalysis, Reflex to Urine Culture Urine, Clean Catch; Future; Expected date: 03/25/2025    Vitamin D deficiency  -     Vitamin D; Future; Expected date: 03/25/2025    Screening for colorectal cancer  -     Ambulatory referral/consult to Gastroenterology; Future; Expected date: 03/25/2025    Annual physical exam  Comments:  Labs ordered to be drawn prior to next visit which will be patient's annual visit.  Orders:  -     CBC Auto Differential; Future; Expected date: 03/25/2025  -     Comprehensive Metabolic Panel; Future; Expected date: 03/25/2025  -     Hemoglobin A1C; Future; Expected date: 03/25/2025  -     Lipid Panel; Future; Expected date: 03/25/2025  -     TSH; Future; Expected date: 03/25/2025         Current Medications[3]    Medications Discontinued During This Encounter   Medication Reason    MOUNJARO 15 mg/0.5 mL PnIj     meloxicam (MOBIC) 7.5 MG tablet     DULoxetine (CYMBALTA) 30 MG capsule     benzonatate (TESSALON) 100 MG capsule        Health Maintenance   Topic Date Due    Diabetic Eye Exam  Never done    Pneumococcal Vaccines (Age 50+) (1 of 2 - PCV) Never done    TETANUS VACCINE  08/06/2014    Shingles Vaccine (1 of 2) Never done    Colorectal Cancer Screening  06/27/2024    Influenza Vaccine (1) 09/01/2024    COVID-19 Vaccine (3 - 2024-25 season) 09/01/2024    Foot Exam  09/11/2024    Hemoglobin A1c  05/27/2025    Diabetes  "Urine Screening  07/25/2025    Mammogram  09/03/2025    Lipid Panel  02/27/2026    Low Dose Statin  03/11/2026    RSV Vaccine (Age 60+ and Pregnant patients) (1 - 1-dose 75+ series) 10/14/2041    Hepatitis C Screening  Completed    HIV Screening  Completed       Patient Instructions   RTC in 3 months for F/U or sooner if needed.    Keep appts with specialists as scheduled.    Patient Education     Controlling your blood pressure through lifestyle   The Basics   Written by the doctors and editors at [facility]   What does my lifestyle have to do with my blood pressure? -- The things you do and the foods you eat have a big effect on your blood pressure and your overall health. Following the right lifestyle can:   Lower your blood pressure, or keep you from getting high blood pressure in the first place   Reduce your need for blood pressure medicines   Make medicines for high blood pressure work better, if you do take them   Lower the chances that you'll have a heart attack or stroke, or develop kidney disease  Which lifestyle choices will help lower my blood pressure? -- You can:   Lose weight (if you are overweight).   Choose a diet rich in fruits, vegetables, and low-fat dairy products, and low in meats, sweets, and refined grains.   Eat less salt (sodium).   Do something active for at least 30 minutes a day on most days of the week.   Limit the amount of alcohol you drink.  If you have high blood pressure, it's also very important to quit smoking (if you smoke). Quitting smoking might not bring your blood pressure down. But it will lower the chances that you'll have a heart attack or stroke, and it will help you feel better and live longer.  Start low, and go slow -- The changes listed above might sound like a lot, but don't worry. You don't have to change everything all at once. The key to improving your lifestyle is to "start low, and go slow." Choose 1 small, specific thing to change, and try doing it for a while. " "If it works for you, keep doing it until it becomes a habit. If it doesn't, don't give up. Choose something else to change, and see how that goes.  Let's say, for example, that you would like to improve your diet. If you're the type of person who eats cheeseburgers and French fries often, you can't switch to eating just salads from one day to the next. When people try to make changes like that, they often fail. Then, they feel frustrated and tend to give up. So instead of trying to change everything about your diet in 1 day, change 1 or 2 small things about your diet and give yourself time to get used to those changes. For instance, keep the cheeseburger but give up the French fries. Or eat the same things, but cut your portions in half.  As you find things that you are able to change and stick with, keep adding new changes. In time, you will see that you can actually change a lot. You just have to get used to the changes slowly.  Lose weight -- When people think about losing weight, they sometimes make it more complicated than it really is. To lose weight, you have to either eat less or move more. If you do both of those things, it's even better. But there is no single weight loss diet or activity that's better than any other. When it comes to weight loss, the most effective plan is the one that you'll stick with.  Improve your diet -- There is no single diet that is right for everyone. But in general, a healthy diet can include:   Lots of fruits, vegetables, and whole grains   Some beans, peas, lentils, chickpeas, and similar foods   Some nuts, such as walnuts, almonds, and peanuts   Fat-free or low-fat milk and milk products   Some fish  To have a healthy diet, it's also important to limit or avoid sugar, sweets, meats, and refined grains. (Refined grains are found in white bread, white rice, most forms of pasta, and most packaged "snack" foods.)  Reduce salt -- Many people think that eating a low-sodium diet " "means avoiding the salt shaker and not adding salt when cooking. The truth is, not adding salt at the table or when you cook will only help a little. Almost all of the sodium you eat is already in the food you buy at the grocery store or at restaurants (figure 1).  The most important thing you can do to cut down on sodium is to eat less processed food. That means that you should avoid most foods that are sold in cans, boxes, jars, and bags. You should also eat in restaurants less often.  To reduce the amount of sodium you eat, buy fresh or fresh-frozen fruits, vegetables, and meats. (Fresh-frozen foods have had nothing added to them before freezing.) Then, you can make meals at home, from scratch, with these ingredients.  As with the other changes, don't try to cut out salt all at once. Instead, choose 1 or 2 foods that have a lot of sodium and try to replace them with low-sodium choices. When you get used to those low-sodium options, find another food or 2 to change. Then, keep going, until all of the foods you eat are sodium-free or low in sodium.  Become more active -- If you want to be more active, you don't have to go to the gym or get all sweaty. It is possible to increase your activity level while doing everyday things you enjoy. Walking, gardening, and dancing are just a few of the things that you might try. As with all the other changes, the key is not to do too much too fast. If you don't do any activity now, start by walking for just a few minutes every other day. Do that for a few weeks. If you stick with it, try doing it for longer. But if you find that you don't like walking, try a different activity.  Drink less alcohol -- If you are female, do not have more than 1 "standard drink" of alcohol a day. If you are male, do not have more than 2. A "standard drink" is:   A can or bottle that has 12 ounces of beer   A glass that has 5 ounces of wine   A shot that has 1.5 ounces of hard alcohol  Where should " "I start? -- If you want to improve your lifestyle, start by making the changes that you think would be easiest for you. If you used to exercise and just got out of the habit, maybe it would be easy for you to start exercising again. Or if you actually like cooking meals from scratch, maybe the first thing you should focus on is eating home-cooked meals that are low in sodium.  Whatever you tackle first, choose specific, realistic goals, and give yourself a deadline. For example, do not decide that you are going to "exercise more." Instead, decide that you are going to walk for 10 minutes on Monday, Wednesday, and Friday, and that you are going to do this for the next 2 weeks.  When lifestyle changes are too general, people have a hard time following through.  Now go. You can do it!  All topics are updated as new evidence becomes available and our peer review process is complete.  This topic retrieved from [employer] on: May 30, 2024.  Topic 53388 Version 15.0  Release: 32.5.3 - C32.150  © 2024 UpToDate, Inc. and/or its affiliates. All rights reserved.  figure 1: Sources of sodium in your diet     Original data from: Juan FJ, Roberta [place]. Reducing population salt intake worldwide: from evidence to implementation. Prog Cardiovasc Dis 2010; 52:363.  Graphic 22458 Version 2.0  Consumer Information Use and Disclaimer   Disclaimer: This generalized information is a limited summary of diagnosis, treatment, and/or medication information. It is not meant to be comprehensive and should be used as a tool to help the user understand and/or assess potential diagnostic and treatment options. It does NOT include all information about conditions, treatments, medications, side effects, or risks that may apply to a specific patient. It is not intended to be medical advice or a substitute for the medical advice, diagnosis, or treatment of a health care provider based on the health care provider's examination and assessment of a patient's " specific and unique circumstances. Patients must speak with a health care provider for complete information about their health, medical questions, and treatment options, including any risks or benefits regarding use of medications. This information does not endorse any treatments or medications as safe, effective, or approved for treating a specific patient. UpToDate, Inc. and its affiliates disclaim any warranty or liability relating to this information or the use thereof.The use of this information is governed by the Terms of Use, available at https://www.[employer].com/en/know/clinical-effectiveness-terms. 2024© UpToDate, Inc. and its affiliates and/or licensors. All rights reserved.  Copyright   © 2024 UpToDate, Inc. and/or its affiliates. All rights reserved.    Patient Education     Diabetes and diet   The Basics   Written by the doctors and editors at [employer]   Why is diet important if I have diabetes? -- Diet is important because it is part of diabetes treatment. Many people need to change what they eat and how much they eat to help treat their diabetes. It is important for people to treat their diabetes so they:   Keep their blood sugar at goal   Prevent long-term problems, such as heart or kidney problems, that can happen in people with diabetes  Changing your diet can also help treat obesity, high blood pressure, and high cholesterol. These conditions can affect people with diabetes and can lead to future problems, such as heart attacks or strokes.  Who will help me change my diet? -- Your doctor or nurse will work with you to make a food plan to change your diet. They might also recommend that you work with a dietitian. A dietitian is an expert on food and eating.  Do I need to eat at the same times every day? -- When and how often you should eat depends, in part, on the diabetes medicines you take. For example:   People who take about the same amount of insulin at the same time each day (called a  ""fixed regimen") should eat meals at the same times. This is also true for people who take pills that increase insulin levels, such as sulfonylureas. Eating meals at the same time every day helps prevent low blood sugar.   People who adjust the dose and timing of their insulin each day (called a "flexible regimen") do not always have to eat meals at the same time. That's because they can time their insulin dose for before they plan to eat, and also adjust the dose for how much they plan to eat.   People who take medicines that don't usually cause low blood sugar, such as metformin, don't have to eat meals at the same time every day.  What do I need to think about when planning what to eat? -- Our bodies break down the food we eat into small pieces called carbohydrates, proteins, and fats.  When planning what to eat, people with diabetes need to think about:   Carbohydrates (or "carbs") - These are sugars the body uses for energy. They can raise your blood sugar level. Your doctor, nurse, or dietitian will tell you how many carbs you should eat at each meal or snack. Foods that have carbs include:   Bread, pasta, and rice   Vegetables and fruits   Dairy foods   Foods and drinks with added sugar  It is best to get your carbs from fruits, vegetables, whole grains, and low-fat milk. It is best to avoid drinks with added sugar, like soda, juices, and sports drinks.   Protein - Your doctor, nurse, or dietitian will tell you how much protein you should eat each day. It is best to eat lean meats, fish, eggs, beans, peas, soy products, nuts, and seeds. Avoid or limit processed meats like feliciano, hot dogs, and sausages.   Fats - The type of fat you eat is more important than the amount of fat. "Saturated" and "trans" fats can increase your risk for heart problems, like a heart attack.   Foods that have saturated fats include meat, butter, cheese, and ice cream.   Foods that have trans fats include processed food with " ""partially hydrogenated oils" on the ingredient list. This might include fried foods, store-bought cookies, muffins, pies, and cakes.  "Monounsaturated" and "polyunsaturated" fats are better for you. Foods with these types of fat include fish, avocado, olive oil, and nuts.   Calories - You need to eat a certain amount of calories each day to keep your weight the same. If you have excess body weight and want to lose weight, you need to eat fewer calories each day.   Fiber - Eating foods with a lot of fiber can help manage your blood sugar level. Foods that have a lot of fiber include apples, green beans, peas, beans, lentils, nuts, oatmeal, and whole grains.   Salt - People who have high blood pressure should not eat foods that contain a lot of salt (also called sodium). People with high blood pressure should also eat healthy foods, such as fruits, vegetables, and low-fat dairy foods.   Alcohol and sugary drinks - Having more than 1 alcoholic drink (for females) or 2 drinks (for males) a day can raise blood sugar levels. Also, sugary drinks like fruit juice or soda can raise blood sugar levels.  How can I lose weight? -- You can:   Exercise - Try to get at least 30 minutes of physical activity a day, most days of the week. Even gentle exercise, like walking, is good for your health. Some people with diabetes need to change their medicine dose before they exercise. They might also need to check their blood sugar levels before and after exercising.   Eat fewer calories - Your doctor, nurse, or dietitian can tell you how many calories you should eat each day to lose weight.  If you are worried about your weight, size, or shape, talk with your doctor, nurse, or dietitian. They can help you make changes to improve your health.  Can I eat the same foods as my family? -- Yes. You do not need to eat special foods if you have diabetes. You and your family can eat the same foods. Changing your diet is mostly about eating " healthy foods in healthy amounts.  What are the other parts of diabetes treatment? -- Besides changing your diet, the other parts of diabetes treatment are:   Exercise   Medicines  Some people with diabetes need to learn how to match their diet and exercise with their medicine dose. For example, people who use insulin might need to choose the dose of insulin they give themselves. To choose their dose, they need to think about:   What they plan to eat at the next meal   How much exercise they plan to do   What their blood sugar level is  If the diet and exercise do not match the medicine dose, a person's blood sugar level can get too low or too high. Blood sugar levels that are too low or too high can cause problems.  All topics are updated as new evidence becomes available and our peer review process is complete.  This topic retrieved from [employer] on: May 30, 2024.  Topic 18203 Version 13.0  Release: 32.5.3 - C32.150  © 2024 UpToDate, Inc. and/or its affiliates. All rights reserved.  Consumer Information Use and Disclaimer   Disclaimer: This generalized information is a limited summary of diagnosis, treatment, and/or medication information. It is not meant to be comprehensive and should be used as a tool to help the user understand and/or assess potential diagnostic and treatment options. It does NOT include all information about conditions, treatments, medications, side effects, or risks that may apply to a specific patient. It is not intended to be medical advice or a substitute for the medical advice, diagnosis, or treatment of a health care provider based on the health care provider's examination and assessment of a patient's specific and unique circumstances. Patients must speak with a health care provider for complete information about their health, medical questions, and treatment options, including any risks or benefits regarding use of medications. This information does not endorse any treatments or  "medications as safe, effective, or approved for treating a specific patient. UpToDate, Inc. and its affiliates disclaim any warranty or liability relating to this information or the use thereof.The use of this information is governed by the Terms of Use, available at https://www.[employer].com/en/know/clinical-effectiveness-terms. 2024© UpToDate, Inc. and its affiliates and/or licensors. All rights reserved.  Copyright   © 2024 UpToDate, Inc. and/or its affiliates. All rights reserved.    Patient Education       Low Cholesterol, Saturated Fat, and Trans Fat Diet   About this topic   Cholesterol, saturated fat, and trans fat are in many foods. These may raise your blood cholesterol levels. If your cholesterol is too high, this can cause health problems in your heart, liver, kidneys, and even your eyes. The key to lowering your risk of heart problems is to lower your bad fat intake.  Saturated fats and trans fats are the bad fats. These fats clog your arteries and raise your bad cholesterol. Saturated fats and trans fats are solid fats at room temperature. Saturated fats are animal fats. Trans fats are manmade fats. They add flavor to a lot of packaged foods. Staying away from saturated and trans fats will help your heart.  When you do eat foods with fat, make sure they have the good fats. Monounsaturated and polyunsaturated fats are good fats. These fats help raise your good cholesterol and protect your heart.  General   How to Lower Fat and Cholesterol in Your Diet   Read the labels of the foods you buy from the market to find out how much fat is present. Under 5% of total fat on a label means it is "low fat". Over 20% of total fat on a label means it is high fat.  Eat high fiber foods, like soluble fiber. This type of fiber helps lower cholesterol in the body. Choose oatmeal, fruits (like apples), beans, and nuts to get the most soluble fiber.  Eat foods high in omega-3 fatty acids like gopal seeds, walnuts, " salmon, tuna, trout, herring, flaxseed, and soybeans. These foods help keep the heart healthy.  Limit your bad fat and oil intake.  Stay away from butter, stick margarine, shortening, lard, and palm and coconut oil. Pick plant-based spreads instead.  Limit mayonnaise, salad dressings, gravies, and sauces, unless it is made from low-fat ingredients.  Limit chocolate.  Do not eat high-fat processed foods like hot dogs, feliciano, sausage, ham and other luncheon meats high in fat, and some frozen foods. Pick fish, chicken, turkey, and lean meats instead.  Eat more dried beans, lentils, and tofu to get your protein.  Do not eat organ meats, like liver.  Choose nonfat or low-fat milk, yogurt, and cheese.  Use light or fat-free cream cheese and sour cream.  Eat lots of fruits and vegetables.  Pick whole grain breads, cereals, pastas, and rice.  Do not eat snacks that are high in fats like granola, cookies, pies, pastries, doughnuts, and croissants.  Stay away from deep fried foods.  Help When Cooking   Remove the fat portion of meats and the skin from poultry before cooking.  Bake, broil, grill, poach, or roast poultry, fish, and lean meats.  Drain and throw away the fat that drains out of meat as you cook it.  Try to add little or no fat to foods.  Use olive or canola oil for cooking or baking.  Steam your vegetables.  Use herbs or no-oil marinades to flavor foods.         Who should use this diet?   This diet is for people who are at high risk of getting health problems like heart disease, high blood pressure, diabetes, and others. This diet is also good for all people to follow to keep your heart healthy.  What foods are good to eat?   Foods with good fats are:  Canola, peanut, and olive oil  Safflower, soybean, and corn oil  Walnuts, almonds, cashews, and peanuts  Pumpkin and sunflower seeds  [place] and tuna  Tofu  Soymilk  Avocado  What foods should be limited or avoided?   Stay away from these types of foods that have  saturated fats:  Whole fat dairy products like cheese, ice cream, whole milk, and cream  Palm and coconut oils  High-fat meats like beef, lamb, poultry with the skin, feliciano, and sausage  Butter and lard  Stay away from these types of foods that may have trans fat:  Cookies, cakes, candy, doughnuts, baked goods, muffins, pizza dough, and pie crusts that are packaged  Fried foods  Frozen dinners  Chips and crackers  Microwave popcorn  Stick margarine and vegetable shortenings  Helpful tips   To help stay away from saturated fat:  Pick lean cuts of meat  Take the skin off chicken and turkey or pick skinless  Pick low-fat cheese, milk, and ice cream  Use liquid oils when cooking and baking, such as olive oil and canola oil  To help stay away from trans fat:  Look at your labels. Choose foods with 0% trans fat. Read the ingredient list. Avoid foods with partially hydrogenated oil in the ingredient list. This means there is trans fat in the product.  Where can I learn more?   American Heart Association   http://www.heart.org/HEARTORG/Conditions/Cholesterol/PreventionTreatmentofHighCholesterol/Know-Your-Fats_[facility]_305628_Article.jsp   Last Reviewed Date   2021-10-05  Consumer Information Use and Disclaimer   This information is not specific medical advice and does not replace information you receive from your health care provider. This is only a brief summary of general information. It does NOT include all information about conditions, illnesses, injuries, tests, procedures, treatments, therapies, discharge instructions or life-style choices that may apply to you. You must talk with your health care provider for complete information about your health and treatment options. This information should not be used to decide whether or not to accept your health care providers advice, instructions or recommendations. Only your health care provider has the knowledge and training to provide advice that is right for you.   Copyright    Copyright © 2021 UpToDate, Inc. and its affiliates and/or licensors. All rights reserved.      Risks, benefits, and alternatives discussed with patient, Patient verbalized understanding of discussed plan of care. Asked patient if any further questions, answered no.    Future Appointments   Date Time Provider Department Center   4/1/2025 11:00 AM Ellie Braswell RRT [facility] SMOKE [facility] [place]   6/10/2025 11:00 AM Yaima Pulliam, NP [facility] PRICG5  Artemio Pulliam NP         [1]   Social History  Tobacco Use    Smoking status: Every Day     Current packs/day: 0.50     Average packs/day: 0.5 packs/day for 15.0 years (7.5 ttl pk-yrs)     Types: Cigarettes    Smokeless tobacco: Never   Substance Use Topics    Alcohol use: Never    Drug use: Never   [2]   Patient Active Problem List  Diagnosis    Degenerative disc disease, cervical    Degenerative disc disease, lumbar    Neck pain    Bilateral arm pain    Low back pain radiating to right leg    Morbid obesity with BMI of 40.0-44.9, adult    Hyperlipidemia    Benign essential HTN    Type 2 diabetes mellitus with neurologic complication, with long-term current use of insulin    Gastroesophageal reflux disease with esophagitis without hemorrhage    Other emphysema    Chronic kidney disease, stage 3a   [3]   Current Outpatient Medications   Medication Sig Dispense Refill    albuterol (PROVENTIL/VENTOLIN HFA) 90 mcg/actuation inhaler INHALE 2 PUFFS BY MOUTH EVERY EVENING AS NEEDED for SHORTNESS OF BREATH or FOR WHEEZING thank you**ynes** :-) 18 g 2    amitriptyline (ELAVIL) 50 MG tablet TAKE 1 TABLET EVERY EVENING AS DIRECTED 90 tablet 3    ammonium lactate (LAC-HYDRIN) 12 % lotion APPLY TO AFFECTED AREA 2 TIMES A DAY thank youmike -)      atorvastatin (LIPITOR) 80 MG tablet TAKE 1 TABLET EVERY EVENING 90 tablet 3    cholecalciferol, vitamin D3, 1,250 mcg (50,000 unit) capsule TAKE 1 CAPSULE BY MOUTH WEEKLY AS DIRECTED 12 capsule 3    clindamycin  "(CLEOCIN) 300 MG capsule Take 300 mg by mouth 3 (three) times daily.      COMBIVENT RESPIMAT  mcg/actuation inhaler INHALE 1 PUFF INTO THE LUNGS EVERY 6 (SIX) HOURS AS NEEDED FOR WHEEZING OR SHORTNESS OF BREATH (RESCUE) 12 g 3    cyclobenzaprine (FLEXERIL) 10 MG tablet TAKE 1 TABLET BY MOUTH 3 TIMES DAILY AS NEEDED FOR MUSCLE SPASM(S). MAY MAKE DROWSY 90 tablet 11    estradioL (ESTRACE) 1 MG tablet TAKE 1 TABLET ONE TIME DAILY 90 tablet 3    folic acid (FOLVITE) 1 MG tablet TAKE 1 TABLET EVERY DAY 90 tablet 3    furosemide (LASIX) 40 MG tablet TAKE 1 TABLET (40 MG TOTAL) BY MOUTH ONCE DAILY. 90 tablet 3    levothyroxine (SYNTHROID) 125 MCG tablet       LOMAIRA 8 mg Tab Take 1 tablet by mouth 3 (three) times daily with meals.      loratadine (CLARITIN) 10 mg tablet Take 10 mg by mouth.      losartan (COZAAR) 100 MG tablet TAKE 1 TABLET ONE TIME DAILY 90 tablet 3    MOUNJARO 7.5 mg/0.5 mL PnIj       potassium chloride SA (K-DUR,KLOR-CON) 20 MEQ tablet TAKE 1 TABLET EVERY NIGHT 90 tablet 3    pregabalin (LYRICA) 300 MG Cap TAKE 1 CAPSULE TWICE DAILY 180 capsule 0    TOUJEO MAX U-300 SOLOSTAR 300 unit/mL (3 mL) insulin pen INJECT 100 UNITS SUBCUTANEOUS once DAILY      TRUEPLUS PEN NEEDLE 32 gauge x 5/32" Ndle USE DAILY OR AS DIRECTED      XIGDUO XR 5-1,000 mg TAKE 2 TABLETS EVERY MORNING 180 tablet 3    azelastine (ASTELIN) 137 mcg (0.1 %) nasal spray USE 1 SPRAY NASALLY TWICE DAILY 60 mL 3    fluticasone propionate (FLONASE) 50 mcg/actuation nasal spray USE 1 SPRAY IN EACH NOSTRIL ONE TIME DAILY 32 g 3    varenicline tartrate (CHANTIX) 0.5 MG Tab Take one tablet by mouth once daily for 3 days, take one tablet by mouth twice daily for 4 days (take this bottle first) 11 tablet 0    varenicline tartrate (CHANTIX) 1 mg Tab Take 1 tablet (1 mg total) by mouth 2 (two) times daily. 56 tablet 0     No current facility-administered medications for this visit.     "

## 2025-03-05 NOTE — PROGRESS NOTES
Spoke with patient today in regard to smoking cessation progress for 6 month phone follow up on quit 4.  Patient states she has relapsed and is not tobacco free at this time.  Patient has scheduled an appointment to return to the program for Quit attempt #5. Informed patient of benefit period, future follow ups, and contact information if any further help or support is needed.  Will complete smart form on Quit attempt #4.

## 2025-03-10 ENCOUNTER — TELEPHONE (OUTPATIENT)
Dept: PRIMARY CARE CLINIC | Facility: CLINIC | Age: 60
End: 2025-03-10
Payer: MEDICARE

## 2025-03-10 DIAGNOSIS — B37.31 VAGINAL YEAST INFECTION: Primary | ICD-10-CM

## 2025-03-10 RX ORDER — FLUCONAZOLE 150 MG/1
150 TABLET ORAL DAILY
Qty: 2 TABLET | Refills: 0 | Status: SHIPPED | OUTPATIENT
Start: 2025-03-10 | End: 2025-03-12

## 2025-03-11 ENCOUNTER — CLINICAL SUPPORT (OUTPATIENT)
Dept: SMOKING CESSATION | Facility: CLINIC | Age: 60
End: 2025-03-11
Payer: COMMERCIAL

## 2025-03-11 DIAGNOSIS — F17.200 NICOTINE DEPENDENCE: Primary | ICD-10-CM

## 2025-03-11 PROCEDURE — 99404 PREV MED CNSL INDIV APPRX 60: CPT | Mod: S$PBB,,, | Performed by: GENERAL PRACTICE

## 2025-03-11 RX ORDER — VARENICLINE TARTRATE 1 MG/1
1 TABLET, FILM COATED ORAL 2 TIMES DAILY
Qty: 56 TABLET | Refills: 0 | Status: SHIPPED | OUTPATIENT
Start: 2025-03-11

## 2025-03-11 RX ORDER — VARENICLINE TARTRATE 0.5 MG/1
TABLET, FILM COATED ORAL
Qty: 11 TABLET | Refills: 0 | Status: SHIPPED | OUTPATIENT
Start: 2025-03-11

## 2025-03-18 ENCOUNTER — CLINICAL SUPPORT (OUTPATIENT)
Dept: SMOKING CESSATION | Facility: CLINIC | Age: 60
End: 2025-03-18
Payer: COMMERCIAL

## 2025-03-18 DIAGNOSIS — F17.200 NICOTINE DEPENDENCE: Primary | ICD-10-CM

## 2025-03-18 DIAGNOSIS — J30.2 SEASONAL ALLERGIES: ICD-10-CM

## 2025-03-18 PROCEDURE — 99404 PREV MED CNSL INDIV APPRX 60: CPT | Mod: S$PBB,,, | Performed by: GENERAL PRACTICE

## 2025-03-18 RX ORDER — AZELASTINE 1 MG/ML
1 SPRAY, METERED NASAL 2 TIMES DAILY
Qty: 60 ML | Refills: 3 | Status: SHIPPED | OUTPATIENT
Start: 2025-03-18

## 2025-03-18 RX ORDER — FLUTICASONE PROPIONATE 50 MCG
1 SPRAY, SUSPENSION (ML) NASAL
Qty: 32 G | Refills: 3 | Status: SHIPPED | OUTPATIENT
Start: 2025-03-18

## 2025-03-18 NOTE — PROGRESS NOTES
Individual Follow-Up Form    3/18/2025    Quit Date: TBD    Clinical Status of Patient: Outpatient    Length of Service: 60 minutes    Continuing Medication: yes  Chantix     Target Symptoms: Withdrawal and medication side effects. The following were  rated moderate (3) to severe (4) on TCRS:  Moderate (3): None Reported  Severe (4): None Reported    Comments: Spoke with patient at length in clinic regarding tobacco cessation progress. Patient remains on prescribed tobacco cessation medication 1mg Chantix without any negative side effects at this time.  Patient remains smoking a pack of cigarettes per day.  Patient will cut down to 15 cigarettes a day this week.  Reviewed strategies, cues, and triggers. Introduced the negative impact of tobacco on health, the health advantages of discontinuing the use of tobacco, time line improved health changes after a quit, withdrawal issues to expect from nicotine and habit, and ways to achieve the goal of a quit.  Counselor discussed the 4ds (delay, drink, distract, deep breathing) to address nicotine cravings.  Patient appeared to be receptive to the information given.  Counselor will continue to motivate patient to be tobacco free.    Diagnosis: F17.200    Next Visit: 1 week

## 2025-03-24 ENCOUNTER — CLINICAL SUPPORT (OUTPATIENT)
Dept: SMOKING CESSATION | Facility: CLINIC | Age: 60
End: 2025-03-24
Payer: COMMERCIAL

## 2025-03-24 DIAGNOSIS — F17.200 NICOTINE DEPENDENCE: Primary | ICD-10-CM

## 2025-03-24 PROCEDURE — 99404 PREV MED CNSL INDIV APPRX 60: CPT | Mod: S$PBB,,, | Performed by: GENERAL PRACTICE

## 2025-03-24 NOTE — PROGRESS NOTES
Individual Follow-Up Form    3/24/2025    Quit Date: TBD    Clinical Status of Patient: Outpatient    Length of Service: 60 minutes    Continuing Medication: yes  Chantix     Target Symptoms: Withdrawal and medication side effects. The following were  rated moderate (3) to severe (4) on TCRS:  Moderate (3): None Reported  Severe (4): None Reported    Comments: Spoke with patient at length in clinic regarding tobacco cessation progress. Patient remains on prescribed tobacco cessation medication 1mg Chantix without any negative side effects at this time.  Patient decreased to 18 cigarettes daily.  Patient will cut down to 15 cigarettes a day this week.    Reviewed strategies, controlling environment, cues, triggers, new goals set. Introduced high risk situations with preparation interventions, caffeine similarities with withdrawal issues of habit and nicotine, Understanding urges, cravings, stress and relaxation. Open discussion with intervention discussion.  Patient appeared to be receptive to the information given.  Counselor will continue to motivate patient to be tobacco free.    Diagnosis: F17.200    Next Visit: 1 week

## 2025-04-01 ENCOUNTER — CLINICAL SUPPORT (OUTPATIENT)
Dept: SMOKING CESSATION | Facility: CLINIC | Age: 60
End: 2025-04-01
Payer: COMMERCIAL

## 2025-04-01 DIAGNOSIS — F17.200 NICOTINE DEPENDENCE: Primary | ICD-10-CM

## 2025-04-01 PROCEDURE — 99404 PREV MED CNSL INDIV APPRX 60: CPT | Mod: S$PBB,,, | Performed by: GENERAL PRACTICE

## 2025-04-01 RX ORDER — VARENICLINE TARTRATE 1 MG/1
1 TABLET, FILM COATED ORAL 2 TIMES DAILY
Qty: 56 TABLET | Refills: 1 | Status: SHIPPED | OUTPATIENT
Start: 2025-04-01

## 2025-04-01 RX ORDER — VARENICLINE TARTRATE 1 MG/1
1 TABLET, FILM COATED ORAL 2 TIMES DAILY
Qty: 56 TABLET | Refills: 0 | Status: SHIPPED | OUTPATIENT
Start: 2025-04-01 | End: 2025-04-01

## 2025-04-01 NOTE — PROGRESS NOTES
----- Message from Landy Mclaughlin sent at 2/25/2019  7:42 AM CST -----  Contact: self  [phone]  Pg was seen on 2-14-19, pt is now stuffing with headache and sinus. Pt want to know if you can in a script. Pls call pt [phone], Thanks......Milagros   Individual Follow-Up Form    4/1/2025    Quit Date: TBD    Clinical Status of Patient: Outpatient    Length of Service: 60 minutes    Continuing Medication: yes  Chantix     Target Symptoms: Withdrawal and medication side effects. The following were  rated moderate (3) to severe (4) on TCRS:  Moderate (3): None Reported  Severe (4): None Reported    Comments: Spoke with patient at length in clinic regarding tobacco cessation progress. Patient remains on prescribed tobacco cessation medication 1mg Chantix without any negative side effects at this time.  Patient decreased to 15 cigarettes daily.  Patient states she smoked 12 cigarettes Sunday.   Patient will cut down to 10 cigarettes a day this week.  Reviewed strategies, habitual behavior, high risks situations, understanding urges and cravings, stress and relaxation with open discussion and additional interventions, Introduced lapses, relapses, understanding them and analyzing the situation of a lapse, conflict issues that may be linked to a lapse.  Counselor suggested that patient set daily smoking limits by limiting the number of cigarettes she has access to daily.  Counselor submitted refill for 1mg Chantix.  Patient appeared to be receptive to the information given.  Counselor will continue to motivate patient to be tobacco free.    Diagnosis: F17.200    Next Visit: 1 week

## 2025-04-28 ENCOUNTER — TELEPHONE (OUTPATIENT)
Dept: PRIMARY CARE CLINIC | Facility: CLINIC | Age: 60
End: 2025-04-28
Payer: MEDICARE

## 2025-04-28 DIAGNOSIS — Z79.4 TYPE 2 DIABETES MELLITUS WITH DIABETIC POLYNEUROPATHY, WITH LONG-TERM CURRENT USE OF INSULIN: Primary | ICD-10-CM

## 2025-04-28 DIAGNOSIS — E87.6 LOW BLOOD POTASSIUM: ICD-10-CM

## 2025-04-28 DIAGNOSIS — E11.42 TYPE 2 DIABETES MELLITUS WITH DIABETIC POLYNEUROPATHY, WITH LONG-TERM CURRENT USE OF INSULIN: Primary | ICD-10-CM

## 2025-04-28 RX ORDER — LANCETS
EACH MISCELLANEOUS
Qty: 100 EACH | Refills: 3 | Status: SHIPPED | OUTPATIENT
Start: 2025-04-28

## 2025-04-28 RX ORDER — POTASSIUM CHLORIDE 20 MEQ/1
20 TABLET, EXTENDED RELEASE ORAL NIGHTLY
Qty: 90 TABLET | Refills: 3 | Status: SHIPPED | OUTPATIENT
Start: 2025-04-28

## 2025-04-28 RX ORDER — INSULIN PUMP SYRINGE, 3 ML
EACH MISCELLANEOUS
Qty: 1 EACH | Refills: 0 | Status: SHIPPED | OUTPATIENT
Start: 2025-04-28 | End: 2026-04-28

## 2025-04-28 NOTE — TELEPHONE ENCOUNTER
Tried calling the patient no answer; left a voicemail stating that her supplies were ordered and sent to the pharmacy, explained if the patient needed anything to please give the office a call or message us in the portal.

## 2025-04-28 NOTE — TELEPHONE ENCOUNTER
Please let patient know I have ordered glucose supplies to [facility] for her. Thanks.

## 2025-04-28 NOTE — TELEPHONE ENCOUNTER
----- Message from Med Assistant Watson sent at 4/28/2025  9:07 AM CDT -----  Contact: self  Pt is requesting glucometer and test strips be ordered.  ----- Message -----  From: Anel Bright  Sent: 4/25/2025   4:54 PM CDT  To: [facility] Staff    Type:  RX Refill RequestWho Called: Anca GraceRefill or New Rx:newRX Name and Strength:glucometer and test stripsHow is the patient currently taking it? (ex. 1XDay):n/aIs this a 30 day or 90 day RX:30Preferred Pharmacy with phone number:[facility] - [facility] [address] [facility] [address]: [phone] Fax: [phone] or Mail Order:localOrdering Provider:Lorrie the patient rather a call back or a response via [facility]? [facility] Call Back Number:[phone] Information: n/a

## 2025-05-14 DIAGNOSIS — R06.02 SOB (SHORTNESS OF BREATH): ICD-10-CM

## 2025-05-14 DIAGNOSIS — R06.2 WHEEZING: ICD-10-CM

## 2025-05-15 RX ORDER — ALBUTEROL SULFATE 90 UG/1
INHALANT RESPIRATORY (INHALATION)
Qty: 18 G | Refills: 3 | Status: SHIPPED | OUTPATIENT
Start: 2025-05-15

## 2025-05-22 ENCOUNTER — PATIENT OUTREACH (OUTPATIENT)
Dept: ADMINISTRATIVE | Facility: HOSPITAL | Age: 60
End: 2025-05-22
Payer: MEDICARE

## 2025-05-22 LAB
LEFT EYE DM RETINOPATHY: NEGATIVE
RIGHT EYE DM RETINOPATHY: NEGATIVE

## 2025-06-05 RX ORDER — POLYETHYLENE GLYCOL 3350, SODIUM SULFATE ANHYDROUS, SODIUM BICARBONATE, SODIUM CHLORIDE, POTASSIUM CHLORIDE 236; 22.74; 6.74; 5.86; 2.97 G/4L; G/4L; G/4L; G/4L; G/4L
4000 POWDER, FOR SOLUTION ORAL
COMMUNITY
Start: 2025-06-05 | End: 2025-06-10

## 2025-06-05 RX ORDER — LANCETS 33 GAUGE
EACH MISCELLANEOUS
COMMUNITY
Start: 2025-05-01

## 2025-06-05 RX ORDER — DULOXETIN HYDROCHLORIDE 30 MG/1
CAPSULE, DELAYED RELEASE ORAL
COMMUNITY
Start: 2025-03-18 | End: 2025-06-19

## 2025-06-05 RX ORDER — DAPAGLIFLOZIN AND METFORMIN HYDROCHLORIDE 5; 1000 MG/1; MG/1
2 TABLET, FILM COATED, EXTENDED RELEASE ORAL EVERY MORNING
COMMUNITY
Start: 2024-11-25

## 2025-06-05 RX ORDER — PHENYLEPHRINE HCL 10 MG
500 TABLET ORAL
COMMUNITY

## 2025-06-05 RX ORDER — CYCLOBENZAPRINE HCL 10 MG
10 TABLET ORAL
COMMUNITY

## 2025-06-05 RX ORDER — DIPHENHYDRAMINE HCL 25 MG
TABLET ORAL
COMMUNITY
Start: 2025-05-01

## 2025-06-05 RX ORDER — ATORVASTATIN CALCIUM 80 MG/1
80 TABLET, FILM COATED ORAL NIGHTLY
COMMUNITY
Start: 2025-02-11 | End: 2025-06-10 | Stop reason: SDUPTHER

## 2025-06-05 RX ORDER — ERGOCALCIFEROL 1.25 MG/1
50000 CAPSULE ORAL
COMMUNITY

## 2025-06-05 RX ORDER — FUROSEMIDE 40 MG/1
40 TABLET ORAL EVERY MORNING
COMMUNITY
Start: 2025-02-11 | End: 2025-06-10 | Stop reason: SDUPTHER

## 2025-06-05 RX ORDER — MAGNESIUM GLUCONATE 27.5 (500)
500 TABLET ORAL
COMMUNITY

## 2025-06-05 RX ORDER — TIRZEPATIDE 10 MG/.5ML
INJECTION, SOLUTION SUBCUTANEOUS
COMMUNITY
Start: 2025-04-24

## 2025-06-05 RX ORDER — FOLIC ACID 1 MG/1
1000 TABLET ORAL EVERY MORNING
COMMUNITY

## 2025-06-09 ENCOUNTER — CLINICAL SUPPORT (OUTPATIENT)
Dept: OBSTETRICS AND GYNECOLOGY | Facility: CLINIC | Age: 60
End: 2025-06-09
Payer: MEDICARE

## 2025-06-09 DIAGNOSIS — Z00.00 ANNUAL PHYSICAL EXAM: Primary | ICD-10-CM

## 2025-06-10 ENCOUNTER — TELEPHONE (OUTPATIENT)
Dept: PRIMARY CARE CLINIC | Facility: CLINIC | Age: 60
End: 2025-06-10

## 2025-06-10 ENCOUNTER — OFFICE VISIT (OUTPATIENT)
Dept: PRIMARY CARE CLINIC | Facility: CLINIC | Age: 60
End: 2025-06-10
Payer: MEDICARE

## 2025-06-10 VITALS
SYSTOLIC BLOOD PRESSURE: 128 MMHG | OXYGEN SATURATION: 95 % | HEIGHT: 65 IN | DIASTOLIC BLOOD PRESSURE: 78 MMHG | RESPIRATION RATE: 18 BRPM | HEART RATE: 85 BPM | BODY MASS INDEX: 44.65 KG/M2 | WEIGHT: 268 LBS

## 2025-06-10 DIAGNOSIS — I10 BENIGN ESSENTIAL HTN: ICD-10-CM

## 2025-06-10 DIAGNOSIS — Z79.4 TYPE 2 DIABETES MELLITUS WITH BOTH EYES AFFECTED BY MILD NONPROLIFERATIVE RETINOPATHY AND MACULAR EDEMA, WITH LONG-TERM CURRENT USE OF INSULIN: Chronic | ICD-10-CM

## 2025-06-10 DIAGNOSIS — F41.9 ANXIETY AND DEPRESSION: ICD-10-CM

## 2025-06-10 DIAGNOSIS — K21.00 GASTROESOPHAGEAL REFLUX DISEASE WITH ESOPHAGITIS WITHOUT HEMORRHAGE: ICD-10-CM

## 2025-06-10 DIAGNOSIS — Z00.00 ANNUAL PHYSICAL EXAM: Primary | ICD-10-CM

## 2025-06-10 DIAGNOSIS — B37.31 VAGINAL YEAST INFECTION: ICD-10-CM

## 2025-06-10 DIAGNOSIS — F32.A ANXIETY AND DEPRESSION: ICD-10-CM

## 2025-06-10 DIAGNOSIS — Z79.4 TYPE 2 DIABETES MELLITUS WITH OTHER NEUROLOGIC COMPLICATION, WITH LONG-TERM CURRENT USE OF INSULIN: Chronic | ICD-10-CM

## 2025-06-10 DIAGNOSIS — J43.8 OTHER EMPHYSEMA: Chronic | ICD-10-CM

## 2025-06-10 DIAGNOSIS — J30.2 SEASONAL ALLERGIES: ICD-10-CM

## 2025-06-10 DIAGNOSIS — E78.2 MIXED HYPERLIPIDEMIA: ICD-10-CM

## 2025-06-10 DIAGNOSIS — N18.31 CHRONIC KIDNEY DISEASE, STAGE 3A: ICD-10-CM

## 2025-06-10 DIAGNOSIS — G56.03 BILATERAL CARPAL TUNNEL SYNDROME: ICD-10-CM

## 2025-06-10 DIAGNOSIS — E11.49 TYPE 2 DIABETES MELLITUS WITH OTHER NEUROLOGIC COMPLICATION, WITH LONG-TERM CURRENT USE OF INSULIN: Chronic | ICD-10-CM

## 2025-06-10 DIAGNOSIS — M25.532 BILATERAL WRIST PAIN: ICD-10-CM

## 2025-06-10 DIAGNOSIS — E03.8 OTHER SPECIFIED HYPOTHYROIDISM: ICD-10-CM

## 2025-06-10 DIAGNOSIS — M25.531 BILATERAL WRIST PAIN: ICD-10-CM

## 2025-06-10 DIAGNOSIS — E11.3213 TYPE 2 DIABETES MELLITUS WITH BOTH EYES AFFECTED BY MILD NONPROLIFERATIVE RETINOPATHY AND MACULAR EDEMA, WITH LONG-TERM CURRENT USE OF INSULIN: Chronic | ICD-10-CM

## 2025-06-10 PROCEDURE — 2023F DILAT RTA XM W/O RTNOPTHY: CPT | Mod: CPTII,,, | Performed by: NURSE PRACTITIONER

## 2025-06-10 PROCEDURE — 3061F NEG MICROALBUMINURIA REV: CPT | Mod: CPTII,,, | Performed by: NURSE PRACTITIONER

## 2025-06-10 PROCEDURE — 1159F MED LIST DOCD IN RCRD: CPT | Mod: CPTII,,, | Performed by: NURSE PRACTITIONER

## 2025-06-10 PROCEDURE — 1160F RVW MEDS BY RX/DR IN RCRD: CPT | Mod: CPTII,,, | Performed by: NURSE PRACTITIONER

## 2025-06-10 PROCEDURE — 4010F ACE/ARB THERAPY RXD/TAKEN: CPT | Mod: CPTII,,, | Performed by: NURSE PRACTITIONER

## 2025-06-10 PROCEDURE — 3078F DIAST BP <80 MM HG: CPT | Mod: CPTII,,, | Performed by: NURSE PRACTITIONER

## 2025-06-10 PROCEDURE — 3066F NEPHROPATHY DOC TX: CPT | Mod: CPTII,,, | Performed by: NURSE PRACTITIONER

## 2025-06-10 PROCEDURE — 3008F BODY MASS INDEX DOCD: CPT | Mod: CPTII,,, | Performed by: NURSE PRACTITIONER

## 2025-06-10 PROCEDURE — 3052F HG A1C>EQUAL 8.0%<EQUAL 9.0%: CPT | Mod: CPTII,,, | Performed by: NURSE PRACTITIONER

## 2025-06-10 PROCEDURE — 99214 OFFICE O/P EST MOD 30 MIN: CPT | Mod: S$PBB,,, | Performed by: NURSE PRACTITIONER

## 2025-06-10 PROCEDURE — 3074F SYST BP LT 130 MM HG: CPT | Mod: CPTII,,, | Performed by: NURSE PRACTITIONER

## 2025-06-10 RX ORDER — DOXYCYCLINE 100 MG/1
CAPSULE ORAL
COMMUNITY
Start: 2025-06-02

## 2025-06-10 RX ORDER — FLUCONAZOLE 150 MG/1
150 TABLET ORAL DAILY
Qty: 3 TABLET | Refills: 0 | Status: SHIPPED | OUTPATIENT
Start: 2025-06-10 | End: 2025-06-13

## 2025-06-10 NOTE — TELEPHONE ENCOUNTER
Copied from CRM #[id number]. Topic: General Inquiry - Patient Advice  >> Thanh 10, 2025 12:24 PM Fay wrote:  Type:  Needs Medical Advice    Who Called: Anca Santanaied    Symptoms (please be specific): -   How long has patient had these symptoms:  -  Pharmacy name and phone #:  -  Would the patient rather a call back or a response via "[employer]ner? No nee to call   Best Call Back Number: -  Additional Information:  pt says she let some paperwork at the check out desk will be by to retrieve it today of tomorrow

## 2025-06-10 NOTE — PROGRESS NOTES
Subjective:       Patient ID: Anca Grace is a 59 y.o. female.    Chief Complaint: Referral (Orthopedic for carpal tunnel, back,and knee pain.) and Annual Exam    HPI: Anca is a 59 y.o. female who presents for annual wellness visit.    Annual labs drawn on yesterday and A1C has decreased to 8.9 from 11.6. Kidney function has increased to 57. Urine shows she has a yeast infection likely due to repeated antibiotic use. All other lab results are within normal range.    Requesting Referral to Orthopedic for bilateral carpal tunnel pain which has been an issue for several months.    Ms. Grace c/o bilateral knee pain. Ms. Grace is now seeing Dr. Sellers for chronic bilateral knee arthritis and she plans to have knee surgery soon but she has been referred to Dr. Luna for cardiac evaluation prior to her having surgery and to check her cirulation due to her having issues with leg pain and ulcers in the past. Both of her legs have become infected again due to her blood sugar being elevated so she will is now seeing a Wound D, Dr. Rocha in [place] once weekly. She is currently on an antibiotic for her legs. She has to get down to 235 pounds and her A1C has to get down in order to have the surgery.    DM II -  Chronic and uncontrolled on meds. Followed by [facility] for Endocrinology for her DM Type 2 and Hypothyroidism. She still takes Toujeo 160 units daily, Mounjaro weekly and takes Xigduo daily. She has Neuropathy which she takes Lyrica for it and it helps. [facility] and all was well. She also has diabetic foot exams annually by Dr. Infante in [place].    Hyperlipidemia - controlled with a STATIN. Denies se/ar. Say she has been trying to eat healthier.    Hypothyroidism - levothyroxine increased to 125 mcg daily by Endocrinology at her last visit. Denies se/ar to medication.    Ms. Grace suffers with pain to multiple joints and her back and neck  which is chronic. The Elavil was increased to 50 mg daily, flexeril, cymbalta and still on Lyrica as well. She is also followed by Dr. Gallegos for lower back pain and he does the back injections.    Mammogram UTD. Hysterectomy done in 2012. Takes estradiol daily.    Patient had a Colonoscopy done at [facility] by Dr. Valencia in 2018 with 3 polyps found and removed, told to repeat in 3 years so she had it done in June 2023 with one polyp found but due to her not being fully cleaned out so she was told to repeat in 1 year and she is scheduled to have it done in August.    Reports she has stop smoking since Mother's Day after smoking for 15 years. She used the smoking cessation program and took Chantix.                  Past Medical History:   Diagnosis Date    Diabetes mellitus, type 2     GERD (gastroesophageal reflux disease)     Hyperlipidemia     Hypertension        Past Surgical History:   Procedure Laterality Date    CARPAL TUNNEL RELEASE      HYSTERECTOMY         Family History   Problem Relation Name Age of Onset    Thyroiditis Mother      Heart disease Father      Graves' disease Sister      Heart disease Maternal Grandfather      Cancer Paternal Grandfather Lico Verdin        Social History[1]    Problem List[2]    Immunization History   Administered Date(s) Administered    COVID-19, MRNA, LN-S, PF (MODERNA FULL 0.5 ML DOSE) 10/30/2021, 11/27/2021    DTaP 1966, 01/18/1967, 03/08/1967, 04/10/1968, 03/03/1971, 08/01/1977    IPV 1966, 01/18/1967, 03/08/1967, 04/10/1968, 04/07/1978    Influenza - Quadrivalent - PF *Preferred* (6 months and older) 10/14/2021    Measles 11/07/1967    Mumps 11/13/1968    Rubella 10/21/1970    Tdap 04/13/1988, 08/06/2004           Review of Systems   Constitutional:  Negative for activity change, appetite change, chills, diaphoresis, fatigue and fever.   HENT:  Negative for congestion, ear pain, sinus pain, tinnitus and trouble swallowing.    Eyes:  Negative for pain and  "visual disturbance.   Respiratory:  Negative for cough, chest tightness, shortness of breath and wheezing.    Cardiovascular:  Negative for chest pain, palpitations and leg swelling.   Gastrointestinal:  Negative for abdominal distention, abdominal pain, blood in stool, constipation, diarrhea, nausea and vomiting.   Endocrine: Negative for cold intolerance, heat intolerance, polydipsia, polyphagia and polyuria.   Genitourinary:  Negative for decreased urine volume, dysuria, frequency and hematuria.   Musculoskeletal:  Positive for arthralgias (both wrists). Negative for back pain, gait problem and neck pain.   Skin:  Positive for wound (shins). Negative for color change and rash.   Neurological:  Negative for dizziness, syncope, weakness, light-headedness, numbness and headaches.   Hematological:  Negative for adenopathy. Does not bruise/bleed easily.   Psychiatric/Behavioral:  Negative for behavioral problems, confusion and dysphoric mood. The patient is not nervous/anxious.      Objective:     Vitals:    06/10/25 1110   BP: 128/78   BP Location: Right arm   Patient Position: Sitting   Pulse: 85   Resp: 18   SpO2: 95%   Weight: 121.6 kg (268 lb)   Height: 5' 5" (1.651 m)       Physical Exam  Vitals and nursing note reviewed.   Constitutional:       General: She is not in acute distress.     Appearance: Normal appearance. She is not diaphoretic.   HENT:      Head: Normocephalic and atraumatic.      Nose: Nose normal.      Mouth/Throat:      Mouth: Mucous membranes are moist.      Pharynx: Oropharynx is clear.   Eyes:      General:         Right eye: No discharge.         Left eye: No discharge.      Extraocular Movements: Extraocular movements intact.      Conjunctiva/sclera: Conjunctivae normal.      Pupils: Pupils are equal, round, and reactive to light.   Cardiovascular:      Rate and Rhythm: Normal rate and regular rhythm.      Pulses: Normal pulses.      Heart sounds: Normal heart sounds.   Pulmonary:      " Effort: Pulmonary effort is normal.      Breath sounds: Normal breath sounds. No stridor. No wheezing or rales.   Abdominal:      General: Bowel sounds are normal. There is no distension.      Palpations: Abdomen is soft.      Tenderness: There is no abdominal tenderness.   Musculoskeletal:         General: Tenderness (wrists, multiple joints) present. Normal range of motion.      Cervical back: Normal range of motion and neck supple.      Right lower leg: No edema.      Left lower leg: No edema.   Lymphadenopathy:      Cervical: No cervical adenopathy.   Skin:     General: Skin is warm and dry.      Capillary Refill: Capillary refill takes less than 2 seconds.      Findings: No rash.   Neurological:      General: No focal deficit present.      Mental Status: She is alert and oriented to person, place, and time.      Cranial Nerves: Cranial nerves 2-12 are intact.      Sensory: Sensation is intact.      Motor: Motor function is intact.      Coordination: Coordination is intact.      Gait: Gait is intact.      Deep Tendon Reflexes: Reflexes are normal and symmetric.   Psychiatric:         Mood and Affect: Mood and affect normal.         Speech: Speech normal.         Behavior: Behavior normal. Behavior is cooperative.         Thought Content: Thought content normal.         Cognition and Memory: Cognition and memory normal.         Judgment: Judgment normal.         Patient Outreach on 05/22/2025   Component Date Value Ref Range Status    Left Eye DM Retinopathy 05/22/2025 Negative  Negative Final    Right Eye DM Retinopathy 05/22/2025 Negative  Negative Final   Office Visit on 03/05/2025   Component Date Value Ref Range Status    WBC 06/09/2025 9.28  4.3 - 10.8 X 10 3/ul Final    RBC 06/09/2025 4.76  4.2 - 5.4 X 10 6/ul Final    RDW-SD 06/09/2025 54.7 (H)  37 - 54 fl Final    Hemoglobin 06/09/2025 13.2  12 - 16 g/dL Final    Hematocrit 06/09/2025 40.5  37 - 47 % Final    MCV 06/09/2025 85.1  82 - 100 fl Final    MCH  06/09/2025 27.7  27 - 32 pg Final    MCHC 06/09/2025 32.6  32 - 36 g/dL Final    Platelets 06/09/2025 267  135 - 400 X 10 3/ul Final    Neutrophils 06/09/2025 58.4  34 - 71.1 % Final    Lymphocytes 06/09/2025 23.1  19.3 - 53.1 % Final    Monocytes 06/09/2025 11.0  4.7 - 12.5 % Final    Eosinophils 06/09/2025 5.6  0.7 - 7.0 % Final    Basophils 06/09/2025 1.1  0.2 - 1.2 % Final    Neutrophils Absolute 06/09/2025 5.43  2.15 - 7.56 X 10 3/ul Final    Lymphocytes Absolute 06/09/2025 2.14  0.86 - 4.75 X 10 3/ul Final    Monocytes Absolute 06/09/2025 1.02  0.22 - 1.08 X 10 3/ul Final    Eosinophils Absolute 06/09/2025 0.52  0.04 - 0.54 X 10 3/ul Final    Basophils Absolute 06/09/2025 0.10  0.00 - 0.22 X 10 3/ul Final    Immature Granulocytes Absolute 06/09/2025 0.07 (H)  0 - 0.04 X 10 3/ul Final    Immature Granulocytes 06/09/2025 0.8 (H)  0 - 0.5 % Final    nRBC# 06/09/2025 0.0  0 - 0.2 /100 WBC Final    nRBC Count Absolute 06/09/2025 0.000  0 - 0.012 x 10 3/ul Final    Glucose 06/09/2025 71 (L)  74 - 106 mg/dL Final    BUN 06/09/2025 20.9 (H)  6 - 20 mg/dL Final    Creatinine 06/09/2025 1.12 (H)  0.50 - 0.90 mg/dL Final    AST 06/09/2025 14  0 - 32 U/L Final    ALT (SGPT) 06/09/2025 14  0 - 33 U/L Final    Alkaline Phosphatase 06/09/2025 153 (H)  35 - 105 U/L Final    Calcium 06/09/2025 9.9  8.6 - 10.2 mg/dL Final    Protein, Total 06/09/2025 7.0  6.4 - 8.3 g/dL Final    Albumin 06/09/2025 4.1  3.5 - 5.2 g/dL Final    BILIRUBIN, TOTAL 06/09/2025 0.26  0.00 - 1.20 mg/dL Final    Sodium 06/09/2025 139  136 - 145 mmol/L Final    Potassium 06/09/2025 4.9  3.5 - 5.1 mmol/L Final    Chloride 06/09/2025 95 (L)  98 - 107 mmol/L Final    CO2 06/09/2025 29  22 - 29 mmol/L Final    Globulin 06/09/2025 2.9  1.5 - 4.5 g/dL Final    Albumin/Globulin Ratio 06/09/2025 1.4  1.0 - 2.7 Final    BUN/Creatinine Ratio 06/09/2025 18.7  6 - 20 Final    GFR ESTIMATION 06/09/2025 57.00 (L)  >60.00 mL/min/1.73m2 Final    Anion Gap 06/09/2025 15.0   8.0 - 17.0 mmol/L Final    Hemoglobin A1C 06/09/2025 8.9 (H)  4.0 - 5.6 % Final    EST AVERAGE GLUCOSE 06/09/2025 208 (H)  70 - 126 MG/DL Final    Cholesterol 06/09/2025 146  100 - 200 mg/dL Final    Triglycerides 06/09/2025 215 (H)  0 - 150 mg/dL Final    HDL 06/09/2025 48 (L)  >60 mg/dL Final    LDL Cholesterol 06/09/2025 55.0  0 - 100 mg/dL Final    LDL/HDL Ratio 06/09/2025 1.1  1 - 3 Final    T4, Free 06/09/2025 1.27  0.93 - 1.70 ng/dL Final    TSH 06/09/2025 1.84  0.27 - 4.20 uIU/mL Final    Color, UA 06/09/2025 YELLOW   Final    Clarity, UA 06/09/2025 CLOUDY   Final    Specific Gravity,UA 06/09/2025 1.005  1.005 - 1.030 Final    pH, Urine 06/09/2025 7  5 - 7.5 Final    Leukocytes, UA 06/09/2025 LARGE (A)  NEGATIVE Final    Nitrite, Urine 06/09/2025 NEGATIVE  NEGATIVE Final    Protein, UA 06/09/2025 NEGATIVE  NEGATIVE mg/dL Final    Glucose, UA 06/09/2025 100 (H)  NEGATIVE mg/dL Final    Ketones, UA 06/09/2025 NEGATIVE  NEGATIVE mg/dL Final    Urobilinogen, urine 06/09/2025 NORMAL  0 - 1.0 E.U./dL Final    Bilirubin (UA) 06/09/2025 NEGATIVE  NEGATIVE Final    Occult Blood 06/09/2025 SMALL (A)  NEGATIVE Final    WBC/HPF 06/09/2025 5-10 (A)  <5 Final    RBC/HPF 06/09/2025 0-5  <5 Final    Amorphous, UA 06/09/2025 1+   Final    Bacteria, UA 06/09/2025 2+ (A)  NEG-TRACE Final    Epithelial Cells 06/09/2025 MODERATE (A)  NEGATIVE-FEW Final    Mucus, UA 06/09/2025 NEGATIVE  NEGATIVE Final    VITAMIN D (25OHD) 06/09/2025 96.3  >30 ng/mL Final    Creatinine, Urine 06/09/2025 11.0 (L)  28 - 217 mg/dL Final    MICROALBUMIN QUANT 06/09/2025 <1.2  0.00 - 2.00 MG/DL Final    Microalb/Creat Ratio 06/09/2025 Unable to calculate  0 - 30 UG/MG Final    Urine Culture, Routine 06/09/2025    Final   Orders Only on 02/27/2025   Component Date Value Ref Range Status    Hemoglobin A1C 02/27/2025 11.6 (H)  4.2 - 6.3 % Final    Sodium 02/27/2025 132  131 - 143 mmol/L Final    Potassium 02/27/2025 4.2  3.5 - 5.1 mmol/L Final     Chloride 02/27/2025 95 (L)  98 - 107 mmol/L Final    CO2 02/27/2025 32  21 - 32 mmol/L Final    Anion Gap 02/27/2025 5.0  3.0 - 11.0 mmol/L Final    BUN 02/27/2025 18.0  7.0 - 18.0 mg/dL Final    Creatinine 02/27/2025 1.22 (H)  0.55 - 1.02 mg/dL Final    GFR ESTIMATION 02/27/2025 51 (L)  >60 Final    BUN/Creatinine Ratio 02/27/2025 14.75  Ratio Final    Glucose 02/27/2025 241 (H)  74 - 106 mg/dL Final    Calcium 02/27/2025 8.9  8.5 - 10.1 mg/dL Final    Total Bilirubin 02/27/2025 0.4  0.2 - 1.0 mg/dL Final    AST 02/27/2025 15  15 - 37 U/L Final    ALT 02/27/2025 20  13 - 56 U/L Final    Total Protein 02/27/2025 7.0  6.4 - 8.2 g/dL Final    Albumin 02/27/2025 3.1 (L)  3.4 - 5.0 g/dL Final    Globulin 02/27/2025 3.9 (H)  2.3 - 3.5 g/dL Final    Albumin/Globulin Ratio 02/27/2025 0.8 (L)  1.1 - 1.8 Ratio Final    Alkaline Phosphatase 02/27/2025 167 (H)  45 - 117 U/L Final    Cholesterol 02/27/2025 137  <200 mg/dL Final    Triglycerides 02/27/2025 257 (H)  0 - 149 mg/dL Final    HDL 02/27/2025 42  >39 mg/dL Final    LDL Cholesterol 02/27/2025 43.6  mg/dL Final    VLDL 02/27/2025 51  mg/dL Final    HDL/Cholesterol Ratio 02/27/2025 3.3  Ratio Final    Free T4 02/27/2025 1.38  0.76 - 1.46 ng/dL Final    TSH 02/27/2025 1.200  0.358 - 3.74 uIU/mL Final         Assessment:      1. Annual physical exam    2. Vaginal yeast infection    3. Seasonal allergies    4. Chronic kidney disease, stage 3a    5. Benign essential HTN Continue current regimen   6. Mixed hyperlipidemia Continue current regimen   7. Other specified hypothyroidism Continue current regimen   8. Gastroesophageal reflux disease with esophagitis without hemorrhage Continue current regimen   9. Anxiety and depression Continue current regimen   10. Other emphysema    11. Type 2 diabetes mellitus with other neurologic complication, with long-term current use of insulin Continue current regimen   12. Type 2 diabetes mellitus with both eyes affected by mild  nonproliferative retinopathy and macular edema, with long-term current use of insulin Continue current regimen   13. Bilateral wrist pain    14. Bilateral carpal tunnel syndrome          Plan:     Annual physical exam  Comments:  Lab results reviewed and discussed in detail with patient's full understanding.    Vaginal yeast infection  -     fluconazole (DIFLUCAN) 150 MG Tab; Take 1 tablet (150 mg total) by mouth once daily. for 3 days  Dispense: 3 tablet; Refill: 0    Seasonal allergies    Chronic kidney disease, stage 3a  Comments:  encouraged to drink plenty of water    Benign essential HTN  Comments:  well controlled  Orders:  -     Comprehensive Metabolic Panel; Future; Expected date: 07/02/2025    Mixed hyperlipidemia  Comments:  controlled    Other specified hypothyroidism  Comments:  controlled    Gastroesophageal reflux disease with esophagitis without hemorrhage  Comments:  controlled    Anxiety and depression  Comments:  controlled    Other emphysema  Comments:  stable    Type 2 diabetes mellitus with other neurologic complication, with long-term current use of insulin  -     Hemoglobin A1C; Future; Expected date: 07/02/2025  -     Comprehensive Metabolic Panel; Future; Expected date: 07/02/2025    Type 2 diabetes mellitus with both eyes affected by mild nonproliferative retinopathy and macular edema, with long-term current use of insulin    Bilateral wrist pain  -     Ambulatory referral/consult to Orthopedics; Future; Expected date: 07/09/2025    Bilateral carpal tunnel syndrome  -     Ambulatory referral/consult to Orthopedics; Future; Expected date: 07/09/2025         Current Medications[3]    Medications Discontinued During This Encounter   Medication Reason    atorvastatin (LIPITOR) 80 MG tablet Duplicate Order    ammonium lactate (LAC-HYDRIN) 12 % lotion Patient no longer taking    clindamycin (CLEOCIN) 300 MG capsule Patient no longer taking    fluticasone (VERAMYST) 27.5 mcg/actuation nasal spray  Patient no longer taking    furosemide (LASIX) 40 MG tablet Duplicate Order    MOUNJARO 7.5 mg/0.5 mL PnIj Patient no longer taking    polyethylene glycol (GOLYTELY) 236-22.74-6.74 -5.86 gram suspension Patient no longer taking       Health Maintenance   Topic Date Due    Pneumococcal Vaccines (Age 50+) (1 of 2 - PCV) Never done    TETANUS VACCINE  08/06/2014    Shingles Vaccine (1 of 2) Never done    Colorectal Cancer Screening  06/27/2024    COVID-19 Vaccine (3 - 2024-25 season) 09/01/2024    Foot Exam  09/11/2024    Mammogram  09/03/2025    Influenza Vaccine (1) 09/01/2025    Hemoglobin A1c  09/09/2025    Diabetic Eye Exam  05/22/2026    Diabetes Urine Screening  06/09/2026    Lipid Panel  06/09/2026    Low Dose Statin  06/10/2026    RSV Vaccine (Age 60+ and Pregnant patients) (1 - 1-dose 75+ series) 10/14/2041    Hepatitis C Screening  Completed    HIV Screening  Completed       Patient Instructions   RTC in 3 months for F/U or sooner if needed.    Keep appts with specialists as scheduled.    Patient Education       Yearly Physical for Adults   About this topic   Most people do not want to be sick. Having a checkup each year with your doctor is one way to help you stay healthy. You may need to see your doctor more or less often. How often you need to go to the doctor depends on your age. Your family and medical history also play a role in how often you need to go to the doctor. Going to see your doctor on a routine basis can help you find problems early or even before they start. This may make it easier to treat or cure your problem.  General   Your doctor will talk about many things during your checkup. Your doctor may ask about:  Your medical and family history.  All the drugs you are taking. Be sure to include all prescription, over the counter, and herbal supplements. Tell the doctor if you have any drug allergy. Bring a list of drugs you take with you.  How you are feeling and if you are having any  problems.  Risky behaviors like smoking, drinking alcohol, using illegal drugs, not wearing seatbelts, having unprotected sex, etc.  Your doctor will do a physical exam and may check your:  Height and weight  Blood pressure  Reflexes  Memory  Vision  Hearing  Your doctor may order:  Lab tests  ECG to check your heart rhythm  X-rays  Tests or treatments based on your exam  What lifestyle changes are needed?   Your doctor may suggest you make changes to your lifestyle at this visit. The doctor may talk with you about being more active or lowering stress levels. Ask your doctor what you need to do.  What drugs may be needed?   Your doctor may order drugs or vaccines to protect you from illnesses.  What changes to diet are needed?   Talk to your doctor to see if any changes are needed to your diet.  When do I need to call the doctor?   Call your doctor if you need to learn about any test results. Together you can make a plan for more care.  Helpful tips   Make a list of questions for your doctor before you go. This will help you remember to ask about any concerns. Write down any answers from your doctor so you can look over them after your visit.   Tell your doctor about any changes in your body or health since your last visit.  Ask your doctor about any screening tests you need.  Where can I learn more?   American Academy [employer]  http://familydoctor.org/familydoctor/en/prevention-wellness/staying-healthy/healthy-living/preventive-services-for-healthy-living.printerview.html   Centers for Disease Control  http://www.cdc.gov/family/checkup/   Last Reviewed Date   2019-04-22  Consumer Information Use and Disclaimer   This information is not specific medical advice and does not replace information you receive from your health care provider. This is only a brief summary of general information. It does NOT include all information about conditions, illnesses, injuries, tests, procedures, treatments, therapies,  discharge instructions or life-style choices that may apply to you. You must talk with your health care provider for complete information about your health and treatment options. This information should not be used to decide whether or not to accept your health care providers advice, instructions or recommendations. Only your health care provider has the knowledge and training to provide advice that is right for you.  Copyright   Copyright © 2021 [employer] and its affiliates and/or licensors. All rights reserved.      Patient Education     Controlling your blood pressure through lifestyle   The Basics   Written by the doctors and editors at [employer]   What does my lifestyle have to do with my blood pressure? -- The things you do and the foods you eat have a big effect on your blood pressure and your overall health. Following the right lifestyle can:   Lower your blood pressure, or keep you from getting high blood pressure in the first place   Reduce your need for blood pressure medicines   Make medicines for high blood pressure work better, if you do take them   Lower the chances that you'll have a heart attack or stroke, or develop kidney disease  Which lifestyle choices will help lower my blood pressure? -- You can:   Lose weight (if you are overweight).   Choose a diet rich in fruits, vegetables, and low-fat dairy products, and low in meats, sweets, and refined grains.   Eat less salt (sodium).   Do something active for at least 30 minutes a day on most days of the week.   Limit the amount of alcohol you drink.  If you have high blood pressure, it's also very important to quit smoking (if you smoke). Quitting smoking might not bring your blood pressure down. But it will lower the chances that you'll have a heart attack or stroke, and it will help you feel better and live longer.  Start low, and go slow -- The changes listed above might sound like a lot, but don't worry. You don't have to change everything  "all at once. The key to improving your lifestyle is to "start low, and go slow." Choose 1 small, specific thing to change, and try doing it for a while. If it works for you, keep doing it until it becomes a habit. If it doesn't, don't give up. Choose something else to change, and see how that goes.  Let's say, for example, that you would like to improve your diet. If you're the type of person who eats cheeseburgers and French fries often, you can't switch to eating just salads from one day to the next. When people try to make changes like that, they often fail. Then, they feel frustrated and tend to give up. So instead of trying to change everything about your diet in 1 day, change 1 or 2 small things about your diet and give yourself time to get used to those changes. For instance, keep the cheeseburger but give up the French fries. Or eat the same things, but cut your portions in half.  As you find things that you are able to change and stick with, keep adding new changes. In time, you will see that you can actually change a lot. You just have to get used to the changes slowly.  Lose weight -- When people think about losing weight, they sometimes make it more complicated than it really is. To lose weight, you have to either eat less or move more. If you do both of those things, it's even better. But there is no single weight loss diet or activity that's better than any other. When it comes to weight loss, the most effective plan is the one that you'll stick with.  Improve your diet -- There is no single diet that is right for everyone. But in general, a healthy diet can include:   Lots of fruits, vegetables, and whole grains   Some beans, peas, lentils, chickpeas, and similar foods   Some nuts, such as walnuts, almonds, and peanuts   Fat-free or low-fat milk and milk products   Some fish  To have a healthy diet, it's also important to limit or avoid sugar, sweets, meats, and refined grains. (Refined grains are " "found in white bread, white rice, most forms of pasta, and most packaged "snack" foods.)  Reduce salt -- Many people think that eating a low-sodium diet means avoiding the salt shaker and not adding salt when cooking. The truth is, not adding salt at the table or when you cook will only help a little. Almost all of the sodium you eat is already in the food you buy at the grocery store or at restaurants (figure 1).  The most important thing you can do to cut down on sodium is to eat less processed food. That means that you should avoid most foods that are sold in cans, boxes, jars, and bags. You should also eat in restaurants less often.  To reduce the amount of sodium you eat, buy fresh or fresh-frozen fruits, vegetables, and meats. (Fresh-frozen foods have had nothing added to them before freezing.) Then, you can make meals at home, from scratch, with these ingredients.  As with the other changes, don't try to cut out salt all at once. Instead, choose 1 or 2 foods that have a lot of sodium and try to replace them with low-sodium choices. When you get used to those low-sodium options, find another food or 2 to change. Then, keep going, until all of the foods you eat are sodium-free or low in sodium.  Become more active -- If you want to be more active, you don't have to go to the gym or get all sweaty. It is possible to increase your activity level while doing everyday things you enjoy. Walking, gardening, and dancing are just a few of the things that you might try. As with all the other changes, the key is not to do too much too fast. If you don't do any activity now, start by walking for just a few minutes every other day. Do that for a few weeks. If you stick with it, try doing it for longer. But if you find that you don't like walking, try a different activity.  Drink less alcohol -- If you are female, do not have more than 1 "standard drink" of alcohol a day. If you are male, do not have more than 2. A " ""standard drink" is:   A can or bottle that has 12 ounces of beer   A glass that has 5 ounces of wine   A shot that has 1.5 ounces of hard alcohol  Where should I start? -- If you want to improve your lifestyle, start by making the changes that you think would be easiest for you. If you used to exercise and just got out of the habit, maybe it would be easy for you to start exercising again. Or if you actually like cooking meals from scratch, maybe the first thing you should focus on is eating home-cooked meals that are low in sodium.  Whatever you tackle first, choose specific, realistic goals, and give yourself a deadline. For example, do not decide that you are going to "exercise more." Instead, decide that you are going to walk for 10 minutes on Monday, Wednesday, and Friday, and that you are going to do this for the next 2 weeks.  When lifestyle changes are too general, people have a hard time following through.  Now go. You can do it!  All topics are updated as new evidence becomes available and our peer review process is complete.  This topic retrieved from [employer] on: May 30, 2024.  Topic 49615 Version 15.0  Release: 32.5.3 - C32.150  © 2024 UpToDate, Inc. and/or its affiliates. All rights reserved.  figure 1: Sources of sodium in your diet     Original data from: Juan TRNA, Roberta BOWERS. Reducing population salt intake worldwide: from evidence to implementation. Prog Cardiovasc Dis 2010; 52:363.  Graphic 97671 Version 2.0  Consumer Information Use and Disclaimer   Disclaimer: This generalized information is a limited summary of diagnosis, treatment, and/or medication information. It is not meant to be comprehensive and should be used as a tool to help the user understand and/or assess potential diagnostic and treatment options. It does NOT include all information about conditions, treatments, medications, side effects, or risks that may apply to a specific patient. It is not intended to be medical advice or a " substitute for the medical advice, diagnosis, or treatment of a health care provider based on the health care provider's examination and assessment of a patient's specific and unique circumstances. Patients must speak with a health care provider for complete information about their health, medical questions, and treatment options, including any risks or benefits regarding use of medications. This information does not endorse any treatments or medications as safe, effective, or approved for treating a specific patient. UpToDate, Inc. and its affiliates disclaim any warranty or liability relating to this information or the use thereof.The use of this information is governed by the Terms of Use, available at https://www.[employer].com/en/know/clinical-effectiveness-terms. 2024© UpToDate, Inc. and its affiliates and/or licensors. All rights reserved.  Copyright   © 2024 UpToDate, Inc. and/or its affiliates. All rights reserved.    Patient Education     Diabetes and diet   The Basics   Written by the doctors and editors at [employer]   Why is diet important if I have diabetes? -- Diet is important because it is part of diabetes treatment. Many people need to change what they eat and how much they eat to help treat their diabetes. It is important for people to treat their diabetes so they:   Keep their blood sugar at goal   Prevent long-term problems, such as heart or kidney problems, that can happen in people with diabetes  Changing your diet can also help treat obesity, high blood pressure, and high cholesterol. These conditions can affect people with diabetes and can lead to future problems, such as heart attacks or strokes.  Who will help me change my diet? -- Your doctor or nurse will work with you to make a food plan to change your diet. They might also recommend that you work with a dietitian. A dietitian is an expert on food and eating.  Do I need to eat at the same times every day? -- When and how often you  "should eat depends, in part, on the diabetes medicines you take. For example:   People who take about the same amount of insulin at the same time each day (called a "fixed regimen") should eat meals at the same times. This is also true for people who take pills that increase insulin levels, such as sulfonylureas. Eating meals at the same time every day helps prevent low blood sugar.   People who adjust the dose and timing of their insulin each day (called a "flexible regimen") do not always have to eat meals at the same time. That's because they can time their insulin dose for before they plan to eat, and also adjust the dose for how much they plan to eat.   People who take medicines that don't usually cause low blood sugar, such as metformin, don't have to eat meals at the same time every day.  What do I need to think about when planning what to eat? -- Our bodies break down the food we eat into small pieces called carbohydrates, proteins, and fats.  When planning what to eat, people with diabetes need to think about:   Carbohydrates (or "carbs") - These are sugars the body uses for energy. They can raise your blood sugar level. Your doctor, nurse, or dietitian will tell you how many carbs you should eat at each meal or snack. Foods that have carbs include:   Bread, pasta, and rice   Vegetables and fruits   Dairy foods   Foods and drinks with added sugar  It is best to get your carbs from fruits, vegetables, whole grains, and low-fat milk. It is best to avoid drinks with added sugar, like soda, juices, and sports drinks.   Protein - Your doctor, nurse, or dietitian will tell you how much protein you should eat each day. It is best to eat lean meats, fish, eggs, beans, peas, soy products, nuts, and seeds. Avoid or limit processed meats like feliciano, hot dogs, and sausages.   Fats - The type of fat you eat is more important than the amount of fat. "Saturated" and "trans" fats can increase your risk for heart problems, " "like a heart attack.   Foods that have saturated fats include meat, butter, cheese, and ice cream.   Foods that have trans fats include processed food with "partially hydrogenated oils" on the ingredient list. This might include fried foods, store-bought cookies, muffins, pies, and cakes.  "Monounsaturated" and "polyunsaturated" fats are better for you. Foods with these types of fat include fish, avocado, olive oil, and nuts.   Calories - You need to eat a certain amount of calories each day to keep your weight the same. If you have excess body weight and want to lose weight, you need to eat fewer calories each day.   Fiber - Eating foods with a lot of fiber can help manage your blood sugar level. Foods that have a lot of fiber include apples, green beans, peas, beans, lentils, nuts, oatmeal, and whole grains.   Salt - People who have high blood pressure should not eat foods that contain a lot of salt (also called sodium). People with high blood pressure should also eat healthy foods, such as fruits, vegetables, and low-fat dairy foods.   Alcohol and sugary drinks - Having more than 1 alcoholic drink (for females) or 2 drinks (for males) a day can raise blood sugar levels. Also, sugary drinks like fruit juice or soda can raise blood sugar levels.  How can I lose weight? -- You can:   Exercise - Try to get at least 30 minutes of physical activity a day, most days of the week. Even gentle exercise, like walking, is good for your health. Some people with diabetes need to change their medicine dose before they exercise. They might also need to check their blood sugar levels before and after exercising.   Eat fewer calories - Your doctor, nurse, or dietitian can tell you how many calories you should eat each day to lose weight.  If you are worried about your weight, size, or shape, talk with your doctor, nurse, or dietitian. They can help you make changes to improve your health.  Can I eat the same foods as my family? -- " Yes. You do not need to eat special foods if you have diabetes. You and your family can eat the same foods. Changing your diet is mostly about eating healthy foods in healthy amounts.  What are the other parts of diabetes treatment? -- Besides changing your diet, the other parts of diabetes treatment are:   Exercise   Medicines  Some people with diabetes need to learn how to match their diet and exercise with their medicine dose. For example, people who use insulin might need to choose the dose of insulin they give themselves. To choose their dose, they need to think about:   What they plan to eat at the next meal   How much exercise they plan to do   What their blood sugar level is  If the diet and exercise do not match the medicine dose, a person's blood sugar level can get too low or too high. Blood sugar levels that are too low or too high can cause problems.  All topics are updated as new evidence becomes available and our peer review process is complete.  This topic retrieved from [employer] [facility] on: May 30, 2024.  Topic 09170 Version 13.0  Release: 32.5.3 - C32.150  © 2024 UpToDate, Inc. and/or its affiliates. All rights reserved.  Consumer Information Use and Disclaimer   Disclaimer: This generalized information is a limited summary of diagnosis, treatment, and/or medication information. It is not meant to be comprehensive and should be used as a tool to help the user understand and/or assess potential diagnostic and treatment options. It does NOT include all information about conditions, treatments, medications, side effects, or risks that may apply to a specific patient. It is not intended to be medical advice or a substitute for the medical advice, diagnosis, or treatment of a health care provider based on the health care provider's examination and assessment of a patient's specific and unique circumstances. Patients must speak with a health care provider for complete information about their health, medical  "questions, and treatment options, including any risks or benefits regarding use of medications. This information does not endorse any treatments or medications as safe, effective, or approved for treating a specific patient. UpToDate, Inc. and its affiliates disclaim any warranty or liability relating to this information or the use thereof.The use of this information is governed by the Terms of Use, available at https://www.[employer].com/en/know/clinical-effectiveness-terms. 2024© UpToDate, Inc. and its affiliates and/or licensors. All rights reserved.  Copyright   © 2024 UpToDate, Inc. and/or its affiliates. All rights reserved.    Patient Education       Low Cholesterol, Saturated Fat, and Trans Fat Diet   About this topic   Cholesterol, saturated fat, and trans fat are in many foods. These may raise your blood cholesterol levels. If your cholesterol is too high, this can cause health problems in your heart, liver, kidneys, and even your eyes. The key to lowering your risk of heart problems is to lower your bad fat intake.  Saturated fats and trans fats are the bad fats. These fats clog your arteries and raise your bad cholesterol. Saturated fats and trans fats are solid fats at room temperature. Saturated fats are animal fats. Trans fats are manmade fats. They add flavor to a lot of packaged foods. Staying away from saturated and trans fats will help your heart.  When you do eat foods with fat, make sure they have the good fats. Monounsaturated and polyunsaturated fats are good fats. These fats help raise your good cholesterol and protect your heart.  General   How to Lower Fat and Cholesterol in Your Diet   Read the labels of the foods you buy from the market to find out how much fat is present. Under 5% of total fat on a label means it is "low fat". Over 20% of total fat on a label means it is high fat.  Eat high fiber foods, like soluble fiber. This type of fiber helps lower cholesterol in the body. " Choose oatmeal, fruits (like apples), beans, and nuts to get the most soluble fiber.  Eat foods high in omega-3 fatty acids like gopal seeds, walnuts, salmon, tuna, trout, herring, flaxseed, and soybeans. These foods help keep the heart healthy.  Limit your bad fat and oil intake.  Stay away from butter, stick margarine, shortening, lard, and palm and coconut oil. Pick plant-based spreads instead.  Limit mayonnaise, salad dressings, gravies, and sauces, unless it is made from low-fat ingredients.  Limit chocolate.  Do not eat high-fat processed foods like hot dogs, feliciano, sausage, ham and other luncheon meats high in fat, and some frozen foods. Pick fish, chicken, turkey, and lean meats instead.  Eat more dried beans, lentils, and tofu to get your protein.  Do not eat organ meats, like liver.  Choose nonfat or low-fat milk, yogurt, and cheese.  Use light or fat-free cream cheese and sour cream.  Eat lots of fruits and vegetables.  Pick whole grain breads, cereals, pastas, and rice.  Do not eat snacks that are high in fats like granola, cookies, pies, pastries, doughnuts, and croissants.  Stay away from deep fried foods.  Help When Cooking   Remove the fat portion of meats and the skin from poultry before cooking.  Bake, broil, grill, poach, or roast poultry, fish, and lean meats.  Drain and throw away the fat that drains out of meat as you cook it.  Try to add little or no fat to foods.  Use olive or canola oil for cooking or baking.  Steam your vegetables.  Use herbs or no-oil marinades to flavor foods.         Who should use this diet?   This diet is for people who are at high risk of getting health problems like heart disease, high blood pressure, diabetes, and others. This diet is also good for all people to follow to keep your heart healthy.  What foods are good to eat?   Foods with good fats are:  Canola, peanut, and olive oil  Safflower, soybean, and corn oil  Walnuts, almonds, cashews, and peanuts  Pumpkin  and sunflower seeds  [place] and tuna  Tofu  Soymilk  Avocado  What foods should be limited or avoided?   Stay away from these types of foods that have saturated fats:  Whole fat dairy products like cheese, ice cream, whole milk, and cream  Palm and coconut oils  High-fat meats like beef, lamb, poultry with the skin, feliciano, and sausage  Butter and lard  Stay away from these types of foods that may have trans fat:  Cookies, cakes, candy, doughnuts, baked goods, muffins, pizza dough, and pie crusts that are packaged  Fried foods  Frozen dinners  Chips and crackers  Microwave popcorn  Stick margarine and vegetable shortenings  Helpful tips   To help stay away from saturated fat:  Pick lean cuts of meat  Take the skin off chicken and turkey or pick skinless  Pick low-fat cheese, milk, and ice cream  Use liquid oils when cooking and baking, such as olive oil and canola oil  To help stay away from trans fat:  Look at your labels. Choose foods with 0% trans fat. Read the ingredient list. Avoid foods with partially hydrogenated oil in the ingredient list. This means there is trans fat in the product.  Where can I learn more?   American Heart Association   http://www.heart.org/HEARTORG/Conditions/Cholesterol/PreventionTreatmentofHighCholesterol/Know-Your-Fats_[email or web address]_305628_Article.jsp   Last Reviewed Date   2021-10-05  Consumer Information Use and Disclaimer   This information is not specific medical advice and does not replace information you receive from your health care provider. This is only a brief summary of general information. It does NOT include all information about conditions, illnesses, injuries, tests, procedures, treatments, therapies, discharge instructions or life-style choices that may apply to you. You must talk with your health care provider for complete information about your health and treatment options. This information should not be used to decide whether or not to accept your health care providers  advice, instructions or recommendations. Only your health care provider has the knowledge and training to provide advice that is right for you.   Copyright   Copyright © 2021 UpToDate, Inc. and its affiliates and/or licensors. All rights reserved.      Risks, benefits, and alternatives discussed with patient, Patient verbalized understanding of discussed plan of care. Asked patient if any further questions, answered no.    Future Appointments   Date Time Provider Department Center   9/11/2025  9:40 AM Garrett Pulliam NP [facility]              Garrett Pulliam NP         [1]   Social History  Tobacco Use    Smoking status: Every Day     Current packs/day: 0.50     Average packs/day: 0.5 packs/day for 15.0 years (7.5 ttl pk-yrs)     Types: Cigarettes    Smokeless tobacco: Never   Substance Use Topics    Alcohol use: Never    Drug use: Never   [2]   Patient Active Problem List  Diagnosis    Degenerative disc disease, cervical    Degenerative disc disease, lumbar    Neck pain    Bilateral arm pain    Low back pain radiating to right leg    Morbid obesity with BMI of 40.0-44.9, adult    Hyperlipidemia    Benign essential HTN    Type 2 diabetes mellitus with neurologic complication, with long-term current use of insulin    Gastroesophageal reflux disease with esophagitis without hemorrhage    Other emphysema    Chronic kidney disease, stage 3a   [3]   Current Outpatient Medications   Medication Sig Dispense Refill    albuterol (PROVENTIL/VENTOLIN HFA) 90 mcg/actuation inhaler INHALE 2 PUFFS BY MOUTH EVERY EVENING AS NEEDED FOR SHORTNESS OF BREATH OR FOR WHEEZING 18 g 3    atorvastatin (LIPITOR) 80 MG tablet TAKE 1 TABLET EVERY EVENING 90 tablet 3    azelastine (ASTELIN) 137 mcg (0.1 %) nasal spray USE 1 SPRAY NASALLY TWICE DAILY 60 mL 3    cholecalciferol, vitamin D3, 1,250 mcg (50,000 unit) capsule TAKE 1 CAPSULE BY MOUTH WEEKLY AS DIRECTED 12 capsule 3    cinnamon bark 500 mg capsule Take 500 mg by  mouth.      COMBIVENT RESPIMAT  mcg/actuation inhaler INHALE 1 PUFF INTO THE LUNGS EVERY 6 (SIX) HOURS AS NEEDED FOR WHEEZING OR SHORTNESS OF BREATH (RESCUE) 12 g 3    cyclobenzaprine (FLEXERIL) 10 MG tablet TAKE 1 TABLET BY MOUTH 3 TIMES DAILY AS NEEDED FOR MUSCLE SPASM(S). MAY MAKE DROWSY 90 tablet 11    doxycycline (VIBRAMYCIN) 100 MG Cap TAKE 1 CAPSULE BY MOUTH 2 TIMES A DAY FOR 10 DAYS      estradioL (ESTRACE) 1 MG tablet TAKE 1 TABLET ONE TIME DAILY 90 tablet 3    fluticasone propionate (FLONASE) 50 mcg/actuation nasal spray USE 1 SPRAY IN EACH NOSTRIL ONE TIME DAILY 32 g 3    folic acid (FOLVITE) 1 MG tablet TAKE 1 TABLET EVERY DAY 90 tablet 3    furosemide (LASIX) 40 MG tablet TAKE 1 TABLET (40 MG TOTAL) BY MOUTH ONCE DAILY. 90 tablet 3    levothyroxine (SYNTHROID) 125 MCG tablet       loratadine (CLARITIN) 10 mg tablet Take 10 mg by mouth.      losartan (COZAAR) 100 MG tablet TAKE 1 TABLET ONE TIME DAILY 90 tablet 3    magnesium gluconate 27.5 mg magne- sium (500 mg) Tab Take 500 mg by mouth.      MOUNJARO 10 mg/0.5 mL PnIj       potassium chloride SA (K-DUR,KLOR-CON) 20 MEQ tablet Take 1 tablet (20 mEq total) by mouth every evening. 90 tablet 3    TOUJEO MAX U-300 SOLOSTAR 300 unit/mL (3 mL) insulin pen INJECT 100 UNITS SUBCUTANEOUS once DAILY      varenicline tartrate (CHANTIX) 1 mg Tab Take 1 tablet (1 mg total) by mouth 2 (two) times daily. 56 tablet 1    XIGDUO XR 5-1,000 mg TAKE 2 TABLETS EVERY MORNING 180 tablet 3    amitriptyline (ELAVIL) 50 MG tablet TAKE 1 TABLET EVERY EVENING AS DIRECTED 90 tablet 3    blood sugar diagnostic Strp To check BG once daily, to use with insurance preferred meter 100 strip 3    blood-glucose meter kit To check BG once daily, to use with insurance preferred meter 1 each 0    cyclobenzaprine (FLEXERIL) 10 MG tablet Take 10 mg by mouth. (Patient not taking: Reported on 6/10/2025)      dapaglifloz propaned-metformin (XIGDUO XR) 5-1,000 mg Take 2 tablets by mouth  "every morning. (Patient not taking: Reported on 6/10/2025)      DULoxetine (CYMBALTA) 30 MG capsule TAKE 1 CAPSULE TWICE DAILY AS NEEDED FOR PAIN 180 capsule 3    ergocalciferol (ERGOCALCIFEROL) 50,000 unit Cap Take 50,000 Units by mouth. (Patient not taking: Reported on 6/10/2025)      folic acid (FOLVITE) 1 MG tablet Take 1,000 mcg by mouth every morning.      lancets Misc To check BG once daily, to use with insurance preferred meter 100 each 3    LOMAIRA 8 mg Tab Take 1 tablet by mouth 3 (three) times daily with meals. (Patient not taking: Reported on 6/10/2025)      pregabalin (LYRICA) 300 MG Cap Take 1 capsule (300 mg total) by mouth 2 (two) times daily. 180 capsule 1    TRUE METRIX AIR GLUCOSE METER Misc       TRUEPLUS LANCETS 33 gauge Misc       TRUEPLUS PEN NEEDLE 32 gauge x 5/32" Ndle USE DAILY OR AS DIRECTED       No current facility-administered medications for this visit.     "

## 2025-06-10 NOTE — PATIENT INSTRUCTIONS
RTC in 3 months for F/U or sooner if needed.    Keep appts with specialists as scheduled.    Patient Education       Yearly Physical for Adults   About this topic   Most people do not want to be sick. Having a checkup each year with your doctor is one way to help you stay healthy. You may need to see your doctor more or less often. How often you need to go to the doctor depends on your age. Your family and medical history also play a role in how often you need to go to the doctor. Going to see your doctor on a routine basis can help you find problems early or even before they start. This may make it easier to treat or cure your problem.  General   Your doctor will talk about many things during your checkup. Your doctor may ask about:  Your medical and family history.  All the drugs you are taking. Be sure to include all prescription, over the counter, and herbal supplements. Tell the doctor if you have any drug allergy. Bring a list of drugs you take with you.  How you are feeling and if you are having any problems.  Risky behaviors like smoking, drinking alcohol, using illegal drugs, not wearing seatbelts, having unprotected sex, etc.  Your doctor will do a physical exam and may check your:  Height and weight  Blood pressure  Reflexes  Memory  Vision  Hearing  Your doctor may order:  Lab tests  ECG to check your heart rhythm  X-rays  Tests or treatments based on your exam  What lifestyle changes are needed?   Your doctor may suggest you make changes to your lifestyle at this visit. The doctor may talk with you about being more active or lowering stress levels. Ask your doctor what you need to do.  What drugs may be needed?   Your doctor may order drugs or vaccines to protect you from illnesses.  What changes to diet are needed?   Talk to your doctor to see if any changes are needed to your diet.  When do I need to call the doctor?   Call your doctor if you need to learn about any test results. Together you can make  a plan for more care.  Helpful tips   Make a list of questions for your doctor before you go. This will help you remember to ask about any concerns. Write down any answers from your doctor so you can look over them after your visit.   Tell your doctor about any changes in your body or health since your last visit.  Ask your doctor about any screening tests you need.  Where can I learn more?   American Academy of [employer]icians  http://familydoctor.org/familydoctor/en/prevention-wellness/staying-healthy/healthy-living/preventive-services-for-healthy-living.printerview.html   Centers for Disease Control  http://www.cdc.gov/family/checkup/   Last Reviewed Date   2019-04-22  Consumer Information Use and Disclaimer   This information is not specific medical advice and does not replace information you receive from your health care provider. This is only a brief summary of general information. It does NOT include all information about conditions, illnesses, injuries, tests, procedures, treatments, therapies, discharge instructions or life-style choices that may apply to you. You must talk with your health care provider for complete information about your health and treatment options. This information should not be used to decide whether or not to accept your health care providers advice, instructions or recommendations. Only your health care provider has the knowledge and training to provide advice that is right for you.  Copyright   Copyright © 2021 UpToDate, Inc. and its affiliates and/or licensors. All rights reserved.      Patient Education     Controlling your blood pressure through lifestyle   The Basics   Written by the doctors and editors at [facility]   What does my lifestyle have to do with my blood pressure? -- The things you do and the foods you eat have a big effect on your blood pressure and your overall health. Following the right lifestyle can:   Lower your blood pressure, or keep you from getting high  "blood pressure in the first place   Reduce your need for blood pressure medicines   Make medicines for high blood pressure work better, if you do take them   Lower the chances that you'll have a heart attack or stroke, or develop kidney disease  Which lifestyle choices will help lower my blood pressure? -- You can:   Lose weight (if you are overweight).   Choose a diet rich in fruits, vegetables, and low-fat dairy products, and low in meats, sweets, and refined grains.   Eat less salt (sodium).   Do something active for at least 30 minutes a day on most days of the week.   Limit the amount of alcohol you drink.  If you have high blood pressure, it's also very important to quit smoking (if you smoke). Quitting smoking might not bring your blood pressure down. But it will lower the chances that you'll have a heart attack or stroke, and it will help you feel better and live longer.  Start low, and go slow -- The changes listed above might sound like a lot, but don't worry. You don't have to change everything all at once. The key to improving your lifestyle is to "start low, and go slow." Choose 1 small, specific thing to change, and try doing it for a while. If it works for you, keep doing it until it becomes a habit. If it doesn't, don't give up. Choose something else to change, and see how that goes.  Let's say, for example, that you would like to improve your diet. If you're the type of person who eats cheeseburgers and French fries often, you can't switch to eating just salads from one day to the next. When people try to make changes like that, they often fail. Then, they feel frustrated and tend to give up. So instead of trying to change everything about your diet in 1 day, change 1 or 2 small things about your diet and give yourself time to get used to those changes. For instance, keep the cheeseburger but give up the French fries. Or eat the same things, but cut your portions in half.  As you find things that " "you are able to change and stick with, keep adding new changes. In time, you will see that you can actually change a lot. You just have to get used to the changes slowly.  Lose weight -- When people think about losing weight, they sometimes make it more complicated than it really is. To lose weight, you have to either eat less or move more. If you do both of those things, it's even better. But there is no single weight loss diet or activity that's better than any other. When it comes to weight loss, the most effective plan is the one that you'll stick with.  Improve your diet -- There is no single diet that is right for everyone. But in general, a healthy diet can include:   Lots of fruits, vegetables, and whole grains   Some beans, peas, lentils, chickpeas, and similar foods   Some nuts, such as walnuts, almonds, and peanuts   Fat-free or low-fat milk and milk products   Some fish  To have a healthy diet, it's also important to limit or avoid sugar, sweets, meats, and refined grains. (Refined grains are found in white bread, white rice, most forms of pasta, and most packaged "snack" foods.)  Reduce salt -- Many people think that eating a low-sodium diet means avoiding the salt shaker and not adding salt when cooking. The truth is, not adding salt at the table or when you cook will only help a little. Almost all of the sodium you eat is already in the food you buy at the grocery store or at restaurants (figure 1).  The most important thing you can do to cut down on sodium is to eat less processed food. That means that you should avoid most foods that are sold in cans, boxes, jars, and bags. You should also eat in restaurants less often.  To reduce the amount of sodium you eat, buy fresh or fresh-frozen fruits, vegetables, and meats. (Fresh-frozen foods have had nothing added to them before freezing.) Then, you can make meals at home, from scratch, with these ingredients.  As with the other changes, don't try to cut " "out salt all at once. Instead, choose 1 or 2 foods that have a lot of sodium and try to replace them with low-sodium choices. When you get used to those low-sodium options, find another food or 2 to change. Then, keep going, until all of the foods you eat are sodium-free or low in sodium.  Become more active -- If you want to be more active, you don't have to go to the gym or get all sweaty. It is possible to increase your activity level while doing everyday things you enjoy. Walking, gardening, and dancing are just a few of the things that you might try. As with all the other changes, the key is not to do too much too fast. If you don't do any activity now, start by walking for just a few minutes every other day. Do that for a few weeks. If you stick with it, try doing it for longer. But if you find that you don't like walking, try a different activity.  Drink less alcohol -- If you are female, do not have more than 1 "standard drink" of alcohol a day. If you are male, do not have more than 2. A "standard drink" is:   A can or bottle that has 12 ounces of beer   A glass that has 5 ounces of wine   A shot that has 1.5 ounces of hard alcohol  Where should I start? -- If you want to improve your lifestyle, start by making the changes that you think would be easiest for you. If you used to exercise and just got out of the habit, maybe it would be easy for you to start exercising again. Or if you actually like cooking meals from scratch, maybe the first thing you should focus on is eating home-cooked meals that are low in sodium.  Whatever you tackle first, choose specific, realistic goals, and give yourself a deadline. For example, do not decide that you are going to "exercise more." Instead, decide that you are going to walk for 10 minutes on Monday, Wednesday, and Friday, and that you are going to do this for the next 2 weeks.  When lifestyle changes are too general, people have a hard time following through.  Now " go. You can do it!  All topics are updated as new evidence becomes available and our peer review process is complete.  This topic retrieved from [employer] on: May 30, 2024.  Topic 67100 Version 15.0  Release: 32.5.3 - C32.150  © 2024 UpToDate, Inc. and/or its affiliates. All rights reserved.  figure 1: Sources of sodium in your diet     Original data from: Juan TRAN, Roberta BOWERS. Reducing population salt intake worldwide: from evidence to implementation. Prog Cardiovasc Dis 2010; 52:363.  Graphic 87664 Version 2.0  Consumer Information Use and Disclaimer   Disclaimer: This generalized information is a limited summary of diagnosis, treatment, and/or medication information. It is not meant to be comprehensive and should be used as a tool to help the user understand and/or assess potential diagnostic and treatment options. It does NOT include all information about conditions, treatments, medications, side effects, or risks that may apply to a specific patient. It is not intended to be medical advice or a substitute for the medical advice, diagnosis, or treatment of a health care provider based on the health care provider's examination and assessment of a patient's specific and unique circumstances. Patients must speak with a health care provider for complete information about their health, medical questions, and treatment options, including any risks or benefits regarding use of medications. This information does not endorse any treatments or medications as safe, effective, or approved for treating a specific patient. UpToDate, Inc. and its affiliates disclaim any warranty or liability relating to this information or the use thereof.The use of this information is governed by the Terms of Use, available at https://www.wolterskluwer.com/en/know/clinical-effectiveness-terms. 2024© UpToDate, Inc. and its affiliates and/or licensors. All rights reserved.  Copyright   © 2024 UpToDate, Inc. and/or its affiliates. All rights  "reserved.    Patient Education     Diabetes and diet   The Basics   Written by the doctors and editors at [facility]   Why is diet important if I have diabetes? -- Diet is important because it is part of diabetes treatment. Many people need to change what they eat and how much they eat to help treat their diabetes. It is important for people to treat their diabetes so they:   Keep their blood sugar at goal   Prevent long-term problems, such as heart or kidney problems, that can happen in people with diabetes  Changing your diet can also help treat obesity, high blood pressure, and high cholesterol. These conditions can affect people with diabetes and can lead to future problems, such as heart attacks or strokes.  Who will help me change my diet? -- Your doctor or nurse will work with you to make a food plan to change your diet. They might also recommend that you work with a dietitian. A dietitian is an expert on food and eating.  Do I need to eat at the same times every day? -- When and how often you should eat depends, in part, on the diabetes medicines you take. For example:   People who take about the same amount of insulin at the same time each day (called a "fixed regimen") should eat meals at the same times. This is also true for people who take pills that increase insulin levels, such as sulfonylureas. Eating meals at the same time every day helps prevent low blood sugar.   People who adjust the dose and timing of their insulin each day (called a "flexible regimen") do not always have to eat meals at the same time. That's because they can time their insulin dose for before they plan to eat, and also adjust the dose for how much they plan to eat.   People who take medicines that don't usually cause low blood sugar, such as metformin, don't have to eat meals at the same time every day.  What do I need to think about when planning what to eat? -- Our bodies break down the food we eat into small pieces called " "carbohydrates, proteins, and fats.  When planning what to eat, people with diabetes need to think about:   Carbohydrates (or "carbs") - These are sugars the body uses for energy. They can raise your blood sugar level. Your doctor, nurse, or dietitian will tell you how many carbs you should eat at each meal or snack. Foods that have carbs include:   Bread, pasta, and rice   Vegetables and fruits   Dairy foods   Foods and drinks with added sugar  It is best to get your carbs from fruits, vegetables, whole grains, and low-fat milk. It is best to avoid drinks with added sugar, like soda, juices, and sports drinks.   Protein - Your doctor, nurse, or dietitian will tell you how much protein you should eat each day. It is best to eat lean meats, fish, eggs, beans, peas, soy products, nuts, and seeds. Avoid or limit processed meats like feliciano, hot dogs, and sausages.   Fats - The type of fat you eat is more important than the amount of fat. "Saturated" and "trans" fats can increase your risk for heart problems, like a heart attack.   Foods that have saturated fats include meat, butter, cheese, and ice cream.   Foods that have trans fats include processed food with "partially hydrogenated oils" on the ingredient list. This might include fried foods, store-bought cookies, muffins, pies, and cakes.  "Monounsaturated" and "polyunsaturated" fats are better for you. Foods with these types of fat include fish, avocado, olive oil, and nuts.   Calories - You need to eat a certain amount of calories each day to keep your weight the same. If you have excess body weight and want to lose weight, you need to eat fewer calories each day.   Fiber - Eating foods with a lot of fiber can help manage your blood sugar level. Foods that have a lot of fiber include apples, green beans, peas, beans, lentils, nuts, oatmeal, and whole grains.   Salt - People who have high blood pressure should not eat foods that contain a lot of salt (also called " sodium). People with high blood pressure should also eat healthy foods, such as fruits, vegetables, and low-fat dairy foods.   Alcohol and sugary drinks - Having more than 1 alcoholic drink (for females) or 2 drinks (for males) a day can raise blood sugar levels. Also, sugary drinks like fruit juice or soda can raise blood sugar levels.  How can I lose weight? -- You can:   Exercise - Try to get at least 30 minutes of physical activity a day, most days of the week. Even gentle exercise, like walking, is good for your health. Some people with diabetes need to change their medicine dose before they exercise. They might also need to check their blood sugar levels before and after exercising.   Eat fewer calories - Your doctor, nurse, or dietitian can tell you how many calories you should eat each day to lose weight.  If you are worried about your weight, size, or shape, talk with your doctor, nurse, or dietitian. They can help you make changes to improve your health.  Can I eat the same foods as my family? -- Yes. You do not need to eat special foods if you have diabetes. You and your family can eat the same foods. Changing your diet is mostly about eating healthy foods in healthy amounts.  What are the other parts of diabetes treatment? -- Besides changing your diet, the other parts of diabetes treatment are:   Exercise   Medicines  Some people with diabetes need to learn how to match their diet and exercise with their medicine dose. For example, people who use insulin might need to choose the dose of insulin they give themselves. To choose their dose, they need to think about:   What they plan to eat at the next meal   How much exercise they plan to do   What their blood sugar level is  If the diet and exercise do not match the medicine dose, a person's blood sugar level can get too low or too high. Blood sugar levels that are too low or too high can cause problems.  All topics are updated as new evidence becomes  available and our peer review process is complete.  This topic retrieved from [employer] on: May 30, 2024.  Topic 07029 Version 13.0  Release: 32.5.3 - C32.150  © 2024 UpToDate, Inc. and/or its affiliates. All rights reserved.  Consumer Information Use and Disclaimer   Disclaimer: This generalized information is a limited summary of diagnosis, treatment, and/or medication information. It is not meant to be comprehensive and should be used as a tool to help the user understand and/or assess potential diagnostic and treatment options. It does NOT include all information about conditions, treatments, medications, side effects, or risks that may apply to a specific patient. It is not intended to be medical advice or a substitute for the medical advice, diagnosis, or treatment of a health care provider based on the health care provider's examination and assessment of a patient's specific and unique circumstances. Patients must speak with a health care provider for complete information about their health, medical questions, and treatment options, including any risks or benefits regarding use of medications. This information does not endorse any treatments or medications as safe, effective, or approved for treating a specific patient. UpToDate, Inc. and its affiliates disclaim any warranty or liability relating to this information or the use thereof.The use of this information is governed by the Terms of Use, available at https://www.wolters1Castuwer.com/en/know/clinical-effectiveness-terms. 2024© UpToDate, Inc. and its affiliates and/or licensors. All rights reserved.  Copyright   © 2024 UpToDate, Inc. and/or its affiliates. All rights reserved.    Patient Education       Low Cholesterol, Saturated Fat, and Trans Fat Diet   About this topic   Cholesterol, saturated fat, and trans fat are in many foods. These may raise your blood cholesterol levels. If your cholesterol is too high, this can cause health problems in your heart,  "liver, kidneys, and even your eyes. The key to lowering your risk of heart problems is to lower your bad fat intake.  Saturated fats and trans fats are the bad fats. These fats clog your arteries and raise your bad cholesterol. Saturated fats and trans fats are solid fats at room temperature. Saturated fats are animal fats. Trans fats are manmade fats. They add flavor to a lot of packaged foods. Staying away from saturated and trans fats will help your heart.  When you do eat foods with fat, make sure they have the good fats. Monounsaturated and polyunsaturated fats are good fats. These fats help raise your good cholesterol and protect your heart.  General   How to Lower Fat and Cholesterol in Your Diet   Read the labels of the foods you buy from the market to find out how much fat is present. Under 5% of total fat on a label means it is "low fat". Over 20% of total fat on a label means it is high fat.  Eat high fiber foods, like soluble fiber. This type of fiber helps lower cholesterol in the body. Choose oatmeal, fruits (like apples), beans, and nuts to get the most soluble fiber.  Eat foods high in omega-3 fatty acids like gopal seeds, walnuts, salmon, tuna, trout, herring, flaxseed, and soybeans. These foods help keep the heart healthy.  Limit your bad fat and oil intake.  Stay away from butter, stick margarine, shortening, lard, and palm and coconut oil. Pick plant-based spreads instead.  Limit mayonnaise, salad dressings, gravies, and sauces, unless it is made from low-fat ingredients.  Limit chocolate.  Do not eat high-fat processed foods like hot dogs, feliciano, sausage, ham and other luncheon meats high in fat, and some frozen foods. Pick fish, chicken, turkey, and lean meats instead.  Eat more dried beans, lentils, and tofu to get your protein.  Do not eat organ meats, like liver.  Choose nonfat or low-fat milk, yogurt, and cheese.  Use light or fat-free cream cheese and sour cream.  Eat lots of fruits and " vegetables.  Pick whole grain breads, cereals, pastas, and rice.  Do not eat snacks that are high in fats like granola, cookies, pies, pastries, doughnuts, and croissants.  Stay away from deep fried foods.  Help When Cooking   Remove the fat portion of meats and the skin from poultry before cooking.  Bake, broil, grill, poach, or roast poultry, fish, and lean meats.  Drain and throw away the fat that drains out of meat as you cook it.  Try to add little or no fat to foods.  Use olive or canola oil for cooking or baking.  Steam your vegetables.  Use herbs or no-oil marinades to flavor foods.         Who should use this diet?   This diet is for people who are at high risk of getting health problems like heart disease, high blood pressure, diabetes, and others. This diet is also good for all people to follow to keep your heart healthy.  What foods are good to eat?   Foods with good fats are:  Canola, peanut, and olive oil  Safflower, soybean, and corn oil  Walnuts, almonds, cashews, and peanuts  Pumpkin and sunflower seeds  [place] and tuna  Tofu  Soymilk  Avocado  What foods should be limited or avoided?   Stay away from these types of foods that have saturated fats:  Whole fat dairy products like cheese, ice cream, whole milk, and cream  Palm and coconut oils  High-fat meats like beef, lamb, poultry with the skin, feliciano, and sausage  Butter and lard  Stay away from these types of foods that may have trans fat:  Cookies, cakes, candy, doughnuts, baked goods, muffins, pizza dough, and pie crusts that are packaged  Fried foods  Frozen dinners  Chips and crackers  Microwave popcorn  Stick margarine and vegetable shortenings  Helpful tips   To help stay away from saturated fat:  Pick lean cuts of meat  Take the skin off chicken and turkey or pick skinless  Pick low-fat cheese, milk, and ice cream  Use liquid oils when cooking and baking, such as olive oil and canola oil  To help stay away from trans fat:  Look at your  labels. Choose foods with 0% trans fat. Read the ingredient list. Avoid foods with partially hydrogenated oil in the ingredient list. This means there is trans fat in the product.  Where can I learn more?   American Heart Association   http://www.heart.org/HEARTORG/Conditions/Cholesterol/PreventionTreatmentofHighCholesterol/Know-Your-Fats_UC_305628_Article.jsp   Last Reviewed Date   2021-10-05  Consumer Information Use and Disclaimer   This information is not specific medical advice and does not replace information you receive from your health care provider. This is only a brief summary of general information. It does NOT include all information about conditions, illnesses, injuries, tests, procedures, treatments, therapies, discharge instructions or life-style choices that may apply to you. You must talk with your health care provider for complete information about your health and treatment options. This information should not be used to decide whether or not to accept your health care providers advice, instructions or recommendations. Only your health care provider has the knowledge and training to provide advice that is right for you.   Copyright   Copyright © 2021 [employer] and its affiliates and/or licensors. All rights reserved.

## 2025-06-18 DIAGNOSIS — F32.A ANXIETY AND DEPRESSION: Primary | ICD-10-CM

## 2025-06-18 DIAGNOSIS — G47.09 OTHER INSOMNIA: ICD-10-CM

## 2025-06-18 DIAGNOSIS — M54.89 OTHER CHRONIC BACK PAIN: ICD-10-CM

## 2025-06-18 DIAGNOSIS — M50.30 DEGENERATIVE DISC DISEASE, CERVICAL: ICD-10-CM

## 2025-06-18 DIAGNOSIS — F41.9 ANXIETY AND DEPRESSION: Primary | ICD-10-CM

## 2025-06-18 DIAGNOSIS — G89.29 OTHER CHRONIC BACK PAIN: ICD-10-CM

## 2025-06-19 RX ORDER — DULOXETIN HYDROCHLORIDE 30 MG/1
CAPSULE, DELAYED RELEASE ORAL
Qty: 180 CAPSULE | Refills: 3 | Status: SHIPPED | OUTPATIENT
Start: 2025-06-19

## 2025-06-19 RX ORDER — AMITRIPTYLINE HYDROCHLORIDE 50 MG/1
TABLET, FILM COATED ORAL
Qty: 90 TABLET | Refills: 3 | Status: SHIPPED | OUTPATIENT
Start: 2025-06-19

## 2025-06-20 DIAGNOSIS — M50.30 DEGENERATIVE DISC DISEASE, CERVICAL: ICD-10-CM

## 2025-06-20 DIAGNOSIS — Z79.4 TYPE 2 DIABETES MELLITUS WITH DIABETIC POLYNEUROPATHY, WITH LONG-TERM CURRENT USE OF INSULIN: Primary | ICD-10-CM

## 2025-06-20 DIAGNOSIS — M62.830 MUSCLE SPASM OF BACK: ICD-10-CM

## 2025-06-20 DIAGNOSIS — E11.42 TYPE 2 DIABETES MELLITUS WITH DIABETIC POLYNEUROPATHY, WITH LONG-TERM CURRENT USE OF INSULIN: Primary | ICD-10-CM

## 2025-06-20 RX ORDER — PREGABALIN 300 MG/1
300 CAPSULE ORAL 2 TIMES DAILY
Qty: 180 CAPSULE | Refills: 1 | Status: SHIPPED | OUTPATIENT
Start: 2025-06-20

## 2025-07-23 DIAGNOSIS — M50.30 DEGENERATIVE DISC DISEASE, CERVICAL: ICD-10-CM

## 2025-07-23 DIAGNOSIS — M62.830 MUSCLE SPASM OF BACK: ICD-10-CM

## 2025-07-23 DIAGNOSIS — M62.838 MUSCLE SPASMS OF NECK: Primary | ICD-10-CM

## 2025-07-23 RX ORDER — CYCLOBENZAPRINE HCL 10 MG
TABLET ORAL
Qty: 90 TABLET | Refills: 3 | Status: SHIPPED | OUTPATIENT
Start: 2025-07-23

## 2025-07-23 NOTE — TELEPHONE ENCOUNTER
Copied from CRM #[id number]. Topic: Medications - Medication Refill  >> Jul 23, 2025  3:53 PM Hilda wrote:  .Type:  RX Refill Request    Who Called:  patrick  Refill or New Rx: refill   RX Name and Strength:  1: cyclobenzaprine (FLEXERIL) 10 MG tablet   2: estradioL (ESTRACE) 1 MG tablet   How is the patient currently taking it? (ex. 1XDay): As prescribed     Is this a 30 day or 90 day RX: 30   Preferred Pharmacy with phone number: .  [facility] - [place], [place] - [address]  [address] [facility] [address] [address]  Phone: [phone] Fax: [phone]  Local or Mail Order:mail   Ordering Provider: sonia   Would the patient rather a call back or a response via [facility]?  Call   Best Call Back Number: .[phone]   Additional Information:

## 2025-07-28 ENCOUNTER — TELEPHONE (OUTPATIENT)
Dept: PRIMARY CARE CLINIC | Facility: CLINIC | Age: 60
End: 2025-07-28
Payer: MEDICARE

## 2025-07-28 DIAGNOSIS — Z01.818 PREOPERATIVE CLEARANCE: Primary | ICD-10-CM

## 2025-07-28 NOTE — TELEPHONE ENCOUNTER
Spoke with patient and informed her about the blood work, EKG, and XRAY that needs to be done for pre op clearance.       Copied from CRM #[id number]. Topic: General Inquiry - Return Call  >> Jul 28, 2025  1:52 PM Anca wrote:  ...Type:  Patient Returning Call    Who Called: Anca Grace  What is the call regarding?: pt missed call and is returning the call regarding her surgical clearance.  Would the patient rather a call back or a response via [facility]? call  Best Call Back Number: [phone] (home)  Additional Information:

## 2025-07-28 NOTE — TELEPHONE ENCOUNTER
Please give Patient a call and let her know I have received a request from Dr. Nelson's office for a preoperative clearance which requires her to have blood work, a chest XRAY and an EKG done so please let her know she can come to our lab to have her blood work done and she can go to [facility] for the chest XRAY and EKG to be done, please fax orders, thanks a bunch.

## 2025-07-30 DIAGNOSIS — M62.830 MUSCLE SPASM OF BACK: ICD-10-CM

## 2025-07-30 RX ORDER — CYCLOBENZAPRINE HCL 10 MG
TABLET ORAL
Qty: 90 TABLET | Refills: 11 | Status: SHIPPED | OUTPATIENT
Start: 2025-07-30

## 2025-08-06 ENCOUNTER — TELEPHONE (OUTPATIENT)
Dept: PRIMARY CARE CLINIC | Facility: CLINIC | Age: 60
End: 2025-08-06
Payer: MEDICARE

## 2025-08-06 NOTE — TELEPHONE ENCOUNTER
Copied from CRM #[id number]. Topic: General Inquiry - Patient Advice  >> Aug 5, 2025  9:20 AM Katelin wrote:  Patient is requesting a call back regarding clarification on labs and orders. Please call back at [phone]

## 2025-08-06 NOTE — TELEPHONE ENCOUNTER
Spoke with pt and informed her that she needs to get her chest xray and EKG done at [facility] so we can clear her for surgery. Pt states she went to the path lab yesterday and had her BMP and and CBC drawn. I informed pt that she would need to go back and also have her PT/PTT INR drawn as well so we can clear her for surgery.

## 2025-08-07 ENCOUNTER — TELEPHONE (OUTPATIENT)
Dept: SMOKING CESSATION | Facility: CLINIC | Age: 60
End: 2025-08-07
Payer: MEDICARE

## 2025-08-07 NOTE — TELEPHONE ENCOUNTER
Patient requested to reschedule her appointment time due to a conflict with another medical appointment.  Appointment rescheduled for 8/12/2025 @ 1pm.

## 2025-08-12 ENCOUNTER — CLINICAL SUPPORT (OUTPATIENT)
Dept: SMOKING CESSATION | Facility: CLINIC | Age: 60
End: 2025-08-12
Payer: COMMERCIAL

## 2025-08-12 DIAGNOSIS — F17.200 NICOTINE DEPENDENCE: Primary | ICD-10-CM

## 2025-08-12 PROCEDURE — 99404 PREV MED CNSL INDIV APPRX 60: CPT | Mod: S$PBB,,, | Performed by: GENERAL PRACTICE

## 2025-08-12 RX ORDER — VARENICLINE TARTRATE 0.5 MG/1
TABLET, FILM COATED ORAL
Qty: 11 TABLET | Refills: 0 | Status: SHIPPED | OUTPATIENT
Start: 2025-08-12

## 2025-08-12 RX ORDER — VARENICLINE TARTRATE 1 MG/1
1 TABLET, FILM COATED ORAL 2 TIMES DAILY
Qty: 56 TABLET | Refills: 1 | Status: SHIPPED | OUTPATIENT
Start: 2025-08-12

## 2025-08-14 ENCOUNTER — CLINICAL SUPPORT (OUTPATIENT)
Dept: PRIMARY CARE CLINIC | Facility: CLINIC | Age: 60
End: 2025-08-14
Payer: MEDICARE

## 2025-08-14 ENCOUNTER — TELEPHONE (OUTPATIENT)
Dept: SMOKING CESSATION | Facility: CLINIC | Age: 60
End: 2025-08-14
Payer: MEDICARE

## 2025-08-14 ENCOUNTER — TELEPHONE (OUTPATIENT)
Dept: PRIMARY CARE CLINIC | Facility: CLINIC | Age: 60
End: 2025-08-14
Payer: MEDICARE

## 2025-08-15 ENCOUNTER — TELEPHONE (OUTPATIENT)
Dept: SMOKING CESSATION | Facility: CLINIC | Age: 60
End: 2025-08-15
Payer: MEDICARE

## 2025-08-15 ENCOUNTER — TELEPHONE (OUTPATIENT)
Dept: PRIMARY CARE CLINIC | Facility: CLINIC | Age: 60
End: 2025-08-15
Payer: MEDICARE

## 2025-08-19 ENCOUNTER — CLINICAL SUPPORT (OUTPATIENT)
Dept: SMOKING CESSATION | Facility: CLINIC | Age: 60
End: 2025-08-19
Payer: COMMERCIAL

## 2025-08-19 DIAGNOSIS — F17.200 NICOTINE DEPENDENCE: Primary | ICD-10-CM

## 2025-08-19 PROCEDURE — 99404 PREV MED CNSL INDIV APPRX 60: CPT | Mod: S$PBB,,, | Performed by: GENERAL PRACTICE

## 2025-08-26 ENCOUNTER — CLINICAL SUPPORT (OUTPATIENT)
Dept: SMOKING CESSATION | Facility: CLINIC | Age: 60
End: 2025-08-26
Payer: COMMERCIAL

## 2025-08-26 DIAGNOSIS — F17.200 NICOTINE DEPENDENCE: Primary | ICD-10-CM

## 2025-09-02 ENCOUNTER — CLINICAL SUPPORT (OUTPATIENT)
Dept: SMOKING CESSATION | Facility: CLINIC | Age: 60
End: 2025-09-02
Payer: COMMERCIAL

## 2025-09-02 ENCOUNTER — TELEPHONE (OUTPATIENT)
Dept: PRIMARY CARE CLINIC | Facility: CLINIC | Age: 60
End: 2025-09-02
Payer: MEDICARE

## 2025-09-02 DIAGNOSIS — F17.200 NICOTINE DEPENDENCE: Primary | ICD-10-CM

## 2025-09-02 PROCEDURE — 99403 PREV MED CNSL INDIV APPRX 45: CPT | Mod: S$GLB,,, | Performed by: GENERAL PRACTICE
